# Patient Record
Sex: MALE | Race: BLACK OR AFRICAN AMERICAN | Employment: UNEMPLOYED | URBAN - METROPOLITAN AREA
[De-identification: names, ages, dates, MRNs, and addresses within clinical notes are randomized per-mention and may not be internally consistent; named-entity substitution may affect disease eponyms.]

---

## 2020-10-14 ENCOUNTER — OFFICE VISIT (OUTPATIENT)
Dept: ORTHOPEDIC SURGERY | Age: 55
End: 2020-10-14
Payer: COMMERCIAL

## 2020-10-14 VITALS
BODY MASS INDEX: 30.52 KG/M2 | DIASTOLIC BLOOD PRESSURE: 81 MMHG | TEMPERATURE: 98.1 F | SYSTOLIC BLOOD PRESSURE: 140 MMHG | OXYGEN SATURATION: 98 % | HEART RATE: 90 BPM | HEIGHT: 71 IN | RESPIRATION RATE: 18 BRPM | WEIGHT: 218 LBS

## 2020-10-14 DIAGNOSIS — S46.101A INJURY OF TENDON OF LONG HEAD OF RIGHT BICEPS, INITIAL ENCOUNTER: ICD-10-CM

## 2020-10-14 DIAGNOSIS — M75.111 INCOMPLETE ROTATOR CUFF TEAR OR RUPTURE OF RIGHT SHOULDER, NOT SPECIFIED AS TRAUMATIC: Primary | ICD-10-CM

## 2020-10-14 DIAGNOSIS — M75.41 IMPINGEMENT SYNDROME OF RIGHT SHOULDER: ICD-10-CM

## 2020-10-14 PROCEDURE — 99203 OFFICE O/P NEW LOW 30 MIN: CPT | Performed by: ORTHOPAEDIC SURGERY

## 2020-10-14 RX ORDER — DICLOFENAC SODIUM 75 MG/1
TABLET, DELAYED RELEASE ORAL
COMMUNITY
End: 2020-10-29

## 2020-10-14 RX ORDER — DICLOFENAC SODIUM 10 MG/G
GEL TOPICAL 4 TIMES DAILY
COMMUNITY
End: 2020-10-29

## 2020-10-14 NOTE — PROGRESS NOTES
Patient: Domingo Dupont                MRN: [id number]       SSN: [id number]  YOB: 1965        AGE: 63 y.o. SEX: male  Body mass index is 30.4 kg/m². PCP: MONA Lord  10/14/20    CHIEF COMPLAINT: Right shoulder pain    HPI: Domingo Dupont is a 63 y.o. male patient who presents to the office today with right shoulder pain. He is been having right shoulder pain for 8 or 9 months. He denies any specific injury or trauma. The pain is deep in the shoulder and points to the anterior lateral aspect of her shoulder for the pain. Is worse at night when he sleeps. He is tried injections, oral anti-inflammatories, physical therapy without resolution of symptoms. He has pain when he raises his arm. He has had an MRI and is here today to discuss the possibility of surgical treatment. Past Medical History:   Diagnosis Date    Arthritis     Degenerative arthritis of right knee     Hypertension        History reviewed. No pertinent family history. Current Outpatient Medications   Medication Sig Dispense Refill    diclofenac (VOLTAREN) 1 % gel Apply  to affected area four (4) times daily.  diclofenac EC (VOLTAREN) 75 mg EC tablet Take  by mouth.  oxyCODONE-acetaminophen (PERCOCET 7.5) 7.5-325 mg per tablet       amLODIPine (NORVASC) 10 mg tablet Take  by mouth daily.  PREGABALIN (LYRICA PO) Take  by mouth.  GABAPENTIN (NEURONTIN PO) Take  by mouth.  meloxicam (MOBIC) 15 mg tablet Take 1 Tab by mouth daily. Take with meals 30 Tab 2    HYDROCODONE/ACETAMINOPHEN (VICODIN PO) Take  by mouth.  LISINOPRIL PO Take  by mouth.  NABUMETONE (RELAFEN PO) Take  by mouth. Allergies   Allergen Reactions    Tramadol Nausea Only       History reviewed. No pertinent surgical history.     Social History     Socioeconomic History    Marital status:      Spouse name: Not on file    Number of children: Not on file    Years of education: Not on file    Highest education level: Not on file   Occupational History    Not on file   Social Needs    Financial resource strain: Not on file    Food insecurity     Worry: Not on file     Inability: Not on file    Transportation needs     Medical: Not on file     Non-medical: Not on file   Tobacco Use    Smoking status: Never Smoker    Smokeless tobacco: Never Used   Substance and Sexual Activity    Alcohol use: Yes     Alcohol/week: 0.0 standard drinks     Frequency: Never     Comment: occaisional    Drug use: Never    Sexual activity: Not on file   Lifestyle    Physical activity     Days per week: Not on file     Minutes per session: Not on file    Stress: Not on file   Relationships    Social connections     Talks on phone: Not on file     Gets together: Not on file     Attends Restoration service: Not on file     Active member of club or organization: Not on file     Attends meetings of clubs or organizations: Not on file     Relationship status: Not on file    Intimate partner violence     Fear of current or ex partner: Not on file     Emotionally abused: Not on file     Physically abused: Not on file     Forced sexual activity: Not on file   Other Topics Concern    Not on file   Social History Narrative    Not on file       REVIEW OF SYSTEMS:      CON: negative for recent weight loss/gain, fever, or chills  EYE: negative for double or blurry vision  ENT: negative for hoarseness  RS:   negative for cough, URI, SOB  CV:  negative for chest pain, palpitations  GI:    negative for blood in stool, nausea/vomiting  :  negative for blood in urine  MS: As per HPI  Other systems reviewed and noted below. PHYSICAL EXAMINATION:  Visit Vitals  BP (!) 140/81 (BP 1 Location: Left arm)   Pulse 90   Temp 98.1 °F (36.7 °C)   Resp 18   Ht 5' 11\" (1.803 m)   Wt 218 lb (98.9 kg)   SpO2 98%   BMI 30.40 kg/m²     Body mass index is 30.4 kg/m².     GENERAL: Alert and oriented x3, in no acute distress, well-developed, well-nourished. HEENT: Normocephalic, atraumatic. RESP: Non labored breathing with equal chest rise on inspiration. CV: Well perfused extremities. No cyanosis or clubbing noted. ABDOMEN: Soft, non-tender, non-distended. Shoulder Examination     R   L  ROM   FF  Full   Full  ER  Full   Full   IR  Full   Full  Rotator Cuff Pain   Supra  +   -   Infra  -   -   Subscap -   -  Crepitus  -   -  Effusion  -   -  Warmth  -   -   Erythema  -   -  Instability  -   -  AC Joint TTP  -   -  Clavicle   Deformity -   -   TTP  -   -  Proximal Humerus   Deformity -   -   TTP  -   -  Deltoid Strength 5   5  Biceps Strength 5   5  Biceps Deformity -   -  Biceps Groove Pain +   -  Impingement Sign +   -       IMAGING:  X-rays of the right shoulder, 4 views, were taken in the office today. They do not show any significant bony abnormalities. An MRI from 6/26/2020 was reviewed in the office today. This is of the right shoulder. This shows a high-grade partial-thickness tear of the supraspinatus and possibly upper border subscapularis. ASSESSMENT & PLAN  Diagnosis: Right shoulder rotator cuff tear, high-grade partial-thickness, impingement, right long head biceps tendinopathy    Leti Haley has right shoulder pain from his rotator cuff. He also has impingement and biceps pain. He is failed conservative treatment of this in the form of injections, oral medications, and physical therapy. As such we discussed the possibility of surgery in the form of an arthroscopic rotator cuff repair, subacromial decompression and biceps tenodesis. We discussed recovery as well as risks and benefits and he would like to move forward with surgery. We will start the process of trying to get all set up for him. The patient was counseled at length about the risks of odalys Covid-19 during their perioperative period and any recovery window from their procedure.   The patient was made aware that odalys Covid-19  may worsen their prognosis for recovering from their procedure and lend to a higher morbidity and/or mortality risk. All material risks, benefits, and reasonable alternatives including postponing the procedure were discussed. The patient does  wish to proceed with the procedure at this time.         Electronically signed by: Dimas Corea MD

## 2020-10-14 NOTE — H&P (VIEW-ONLY)
Patient: Mela Roa                MRN: [id number]       SSN: [id number] YOB: 1965        AGE: 63 y.o. SEX: male Body mass index is 30.4 kg/m². PCP: MONA Lopez 
10/14/20 CHIEF COMPLAINT: Right shoulder pain HPI: Mela Roa is a 63 y.o. male patient who presents to the office today with right shoulder pain. He is been having right shoulder pain for 8 or 9 months. He denies any specific injury or trauma. The pain is deep in the shoulder and points to the anterior lateral aspect of her shoulder for the pain. Is worse at night when he sleeps. He is tried injections, oral anti-inflammatories, physical therapy without resolution of symptoms. He has pain when he raises his arm. He has had an MRI and is here today to discuss the possibility of surgical treatment. Past Medical History:  
Diagnosis Date  Arthritis  Degenerative arthritis of right knee  Hypertension History reviewed. No pertinent family history. Current Outpatient Medications Medication Sig Dispense Refill  diclofenac (VOLTAREN) 1 % gel Apply  to affected area four (4) times daily.  diclofenac EC (VOLTAREN) 75 mg EC tablet Take  by mouth.  oxyCODONE-acetaminophen (PERCOCET 7.5) 7.5-325 mg per tablet  amLODIPine (NORVASC) 10 mg tablet Take  by mouth daily.  PREGABALIN (LYRICA PO) Take  by mouth.  GABAPENTIN (NEURONTIN PO) Take  by mouth.  meloxicam (MOBIC) 15 mg tablet Take 1 Tab by mouth daily. Take with meals 30 Tab 2  
 HYDROCODONE/ACETAMINOPHEN (VICODIN PO) Take  by mouth.  LISINOPRIL PO Take  by mouth.  NABUMETONE (RELAFEN PO) Take  by mouth. Allergies Allergen Reactions  Tramadol Nausea Only History reviewed. No pertinent surgical history. Social History Socioeconomic History  Marital status:  Spouse name: Not on file  Number of children: Not on file  Years of education: Not on file  Highest education level: Not on file Occupational History  Not on file Social Needs  Financial resource strain: Not on file  Food insecurity Worry: Not on file Inability: Not on file  Transportation needs Medical: Not on file Non-medical: Not on file Tobacco Use  Smoking status: Never Smoker  Smokeless tobacco: Never Used Substance and Sexual Activity  Alcohol use: Yes Alcohol/week: 0.0 standard drinks Frequency: Never Comment: occaisional  
 Drug use: Never  Sexual activity: Not on file Lifestyle  Physical activity Days per week: Not on file Minutes per session: Not on file  Stress: Not on file Relationships  Social connections Talks on phone: Not on file Gets together: Not on file Attends Hindu service: Not on file Active member of club or organization: Not on file Attends meetings of clubs or organizations: Not on file Relationship status: Not on file  Intimate partner violence Fear of current or ex partner: Not on file Emotionally abused: Not on file Physically abused: Not on file Forced sexual activity: Not on file Other Topics Concern  Not on file Social History Narrative  Not on file REVIEW OF SYSTEMS:   
 
CON: negative for recent weight loss/gain, fever, or chills EYE: negative for double or blurry vision ENT: negative for hoarseness RS:   negative for cough, URI, SOB 
CV:  negative for chest pain, palpitations GI:    negative for blood in stool, nausea/vomiting :  negative for blood in urine MS: As per HPI Other systems reviewed and noted below. PHYSICAL EXAMINATION: 
Visit Vitals BP (!) 140/81 (BP 1 Location: Left arm) Pulse 90 Temp 98.1 °F (36.7 °C) Resp 18 Ht 5' 11\" (1.803 m) Wt 218 lb (98.9 kg) SpO2 98% BMI 30.40 kg/m² Body mass index is 30.4 kg/m². GENERAL: Alert and oriented x3, in no acute distress, well-developed, well-nourished. HEENT: Normocephalic, atraumatic. RESP: Non labored breathing with equal chest rise on inspiration. CV: Well perfused extremities. No cyanosis or clubbing noted. ABDOMEN: Soft, non-tender, non-distended. Shoulder Examination R   L 
ROM 
 FF  Full   Full ER  Full   Full 
 IR  Full   Full Rotator Cuff Pain Supra  +   - Infra  -   - Subscap -   - 
Crepitus  -   - Effusion  -   - Warmth  -   - Erythema  -   - Instability  -   - 
AC Joint TTP  -   - 
Clavicle Deformity -   - 
 TTP  -   - Proximal Humerus Deformity -   - 
 TTP  -   - Deltoid Strength 5   5 Biceps Strength 5   5 Biceps Deformity -   - Biceps Groove Pain +   - Impingement Sign +   - IMAGING: 
X-rays of the right shoulder, 4 views, were taken in the office today. They do not show any significant bony abnormalities. An MRI from 6/26/2020 was reviewed in the office today. This is of the right shoulder. This shows a high-grade partial-thickness tear of the supraspinatus and possibly upper border subscapularis. ASSESSMENT & PLAN Diagnosis: Right shoulder rotator cuff tear, high-grade partial-thickness, impingement, right long head biceps tendinopathy Prem Dejesus has right shoulder pain from his rotator cuff. He also has impingement and biceps pain. He is failed conservative treatment of this in the form of injections, oral medications, and physical therapy. As such we discussed the possibility of surgery in the form of an arthroscopic rotator cuff repair, subacromial decompression and biceps tenodesis. We discussed recovery as well as risks and benefits and he would like to move forward with surgery. We will start the process of trying to get all set up for him.  
 
The patient was counseled at length about the risks of odalys Covid-19 during their perioperative period and any recovery window from their procedure. The patient was made aware that odalys Covid-19  may worsen their prognosis for recovering from their procedure and lend to a higher morbidity and/or mortality risk. All material risks, benefits, and reasonable alternatives including postponing the procedure were discussed. The patient does  wish to proceed with the procedure at this time.  
 
 
 
Electronically signed by: Mecca Ronquillo MD

## 2020-10-20 ENCOUNTER — TELEPHONE (OUTPATIENT)
Dept: ORTHOPEDIC SURGERY | Age: 55
End: 2020-10-20

## 2020-10-20 RX ORDER — DICLOFENAC SODIUM 75 MG/1
75 TABLET, DELAYED RELEASE ORAL 2 TIMES DAILY WITH MEALS
Qty: 60 TAB | Refills: 2 | Status: SHIPPED | OUTPATIENT
Start: 2020-10-20 | End: 2020-10-29

## 2020-10-27 ENCOUNTER — HOSPITAL ENCOUNTER (OUTPATIENT)
Dept: PREADMISSION TESTING | Age: 55
Discharge: HOME | End: 2020-10-27
Payer: COMMERCIAL

## 2020-10-27 DIAGNOSIS — Z01.810 PRE-OPERATIVE CARDIOVASCULAR EXAMINATION: ICD-10-CM

## 2020-10-27 DIAGNOSIS — M75.41 IMPINGEMENT SYNDROME OF RIGHT SHOULDER: ICD-10-CM

## 2020-10-27 DIAGNOSIS — M75.111 INCOMPLETE ROTATOR CUFF TEAR OR RUPTURE OF RIGHT SHOULDER, NOT SPECIFIED AS TRAUMATIC: Primary | ICD-10-CM

## 2020-10-27 DIAGNOSIS — M75.111 INCOMPLETE ROTATOR CUFF TEAR OR RUPTURE OF RIGHT SHOULDER, NOT SPECIFIED AS TRAUMATIC: ICD-10-CM

## 2020-10-27 DIAGNOSIS — Z01.812 PRE-OPERATIVE LABORATORY EXAMINATION: ICD-10-CM

## 2020-10-27 DIAGNOSIS — S46.101A INJURY OF TENDON OF LONG HEAD OF RIGHT BICEPS, INITIAL ENCOUNTER: ICD-10-CM

## 2020-10-27 LAB
ALBUMIN SERPL-MCNC: 3.9 G/DL (ref 3.4–5)
ALBUMIN/GLOB SERPL: 1.1 {RATIO} (ref 0.8–1.7)
ALP SERPL-CCNC: 67 U/L (ref 45–117)
ALT SERPL-CCNC: 28 U/L (ref 16–61)
ANION GAP SERPL CALC-SCNC: 4 MMOL/L (ref 3–18)
AST SERPL-CCNC: 14 U/L (ref 10–38)
ATRIAL RATE: 91 BPM
BASOPHILS # BLD: 0 K/UL (ref 0–0.1)
BASOPHILS NFR BLD: 0 % (ref 0–2)
BILIRUB SERPL-MCNC: 0.2 MG/DL (ref 0.2–1)
BUN SERPL-MCNC: 16 MG/DL (ref 7–18)
BUN/CREAT SERPL: 12 (ref 12–20)
CALCIUM SERPL-MCNC: 9.5 MG/DL (ref 8.5–10.1)
CALCULATED P AXIS, ECG09: 54 DEGREES
CALCULATED R AXIS, ECG10: 21 DEGREES
CALCULATED T AXIS, ECG11: 15 DEGREES
CHLORIDE SERPL-SCNC: 108 MMOL/L (ref 100–111)
CO2 SERPL-SCNC: 31 MMOL/L (ref 21–32)
CREAT SERPL-MCNC: 1.33 MG/DL (ref 0.6–1.3)
DIAGNOSIS, 93000: NORMAL
DIFFERENTIAL METHOD BLD: ABNORMAL
EOSINOPHIL # BLD: 0.2 K/UL (ref 0–0.4)
EOSINOPHIL NFR BLD: 3 % (ref 0–5)
ERYTHROCYTE [DISTWIDTH] IN BLOOD BY AUTOMATED COUNT: 14.5 % (ref 11.6–14.5)
GLOBULIN SER CALC-MCNC: 3.5 G/DL (ref 2–4)
GLUCOSE SERPL-MCNC: 94 MG/DL (ref 74–99)
HCT VFR BLD AUTO: 35.9 % (ref 36–48)
HGB BLD-MCNC: 12.6 G/DL (ref 13–16)
LYMPHOCYTES # BLD: 2.8 K/UL (ref 0.9–3.6)
LYMPHOCYTES NFR BLD: 41 % (ref 21–52)
MCH RBC QN AUTO: 26.6 PG (ref 24–34)
MCHC RBC AUTO-ENTMCNC: 35.1 G/DL (ref 31–37)
MCV RBC AUTO: 75.7 FL (ref 74–97)
MONOCYTES # BLD: 0.5 K/UL (ref 0.05–1.2)
MONOCYTES NFR BLD: 7 % (ref 3–10)
NEUTS SEG # BLD: 3.2 K/UL (ref 1.8–8)
NEUTS SEG NFR BLD: 49 % (ref 40–73)
P-R INTERVAL, ECG05: 158 MS
PLATELET # BLD AUTO: 297 K/UL (ref 135–420)
PMV BLD AUTO: 9.7 FL (ref 9.2–11.8)
POTASSIUM SERPL-SCNC: 4.4 MMOL/L (ref 3.5–5.5)
PROT SERPL-MCNC: 7.4 G/DL (ref 6.4–8.2)
Q-T INTERVAL, ECG07: 336 MS
QRS DURATION, ECG06: 74 MS
QTC CALCULATION (BEZET), ECG08: 413 MS
RBC # BLD AUTO: 4.74 M/UL (ref 4.7–5.5)
SODIUM SERPL-SCNC: 143 MMOL/L (ref 136–145)
VENTRICULAR RATE, ECG03: 91 BPM
WBC # BLD AUTO: 6.7 K/UL (ref 4.6–13.2)

## 2020-10-27 PROCEDURE — 85025 COMPLETE CBC W/AUTO DIFF WBC: CPT

## 2020-10-27 PROCEDURE — 80053 COMPREHEN METABOLIC PANEL: CPT

## 2020-10-27 PROCEDURE — 93005 ELECTROCARDIOGRAM TRACING: CPT

## 2020-10-27 PROCEDURE — 36415 COLL VENOUS BLD VENIPUNCTURE: CPT

## 2020-10-29 ENCOUNTER — HOSPITAL ENCOUNTER (OUTPATIENT)
Dept: PREADMISSION TESTING | Age: 55
Discharge: HOME | End: 2020-10-29
Payer: COMMERCIAL

## 2020-10-29 DIAGNOSIS — Z01.812 PRE-OPERATIVE LABORATORY EXAMINATION: ICD-10-CM

## 2020-10-29 DIAGNOSIS — M75.41 IMPINGEMENT SYNDROME OF RIGHT SHOULDER: ICD-10-CM

## 2020-10-29 DIAGNOSIS — M75.111 INCOMPLETE ROTATOR CUFF TEAR OR RUPTURE OF RIGHT SHOULDER, NOT SPECIFIED AS TRAUMATIC: ICD-10-CM

## 2020-10-29 DIAGNOSIS — S46.101A INJURY OF TENDON OF LONG HEAD OF RIGHT BICEPS, INITIAL ENCOUNTER: ICD-10-CM

## 2020-10-29 PROCEDURE — 87635 SARS-COV-2 COVID-19 AMP PRB: CPT

## 2020-10-29 RX ORDER — TELMISARTAN 20 MG/1
20 TABLET ORAL DAILY
COMMUNITY

## 2020-10-31 LAB — SARS-COV-2, COV2NT: NOT DETECTED

## 2020-11-02 ENCOUNTER — ANESTHESIA EVENT (OUTPATIENT)
Dept: SURGERY | Age: 55
End: 2020-11-02
Payer: COMMERCIAL

## 2020-11-03 ENCOUNTER — HOSPITAL ENCOUNTER (OUTPATIENT)
Age: 55
Setting detail: OUTPATIENT SURGERY
Discharge: HOME | End: 2020-11-03
Attending: ORTHOPAEDIC SURGERY | Admitting: ORTHOPAEDIC SURGERY
Payer: COMMERCIAL

## 2020-11-03 ENCOUNTER — ANESTHESIA (OUTPATIENT)
Dept: SURGERY | Age: 55
End: 2020-11-03
Payer: COMMERCIAL

## 2020-11-03 VITALS
SYSTOLIC BLOOD PRESSURE: 160 MMHG | RESPIRATION RATE: 15 BRPM | HEART RATE: 92 BPM | OXYGEN SATURATION: 96 % | TEMPERATURE: 97 F | DIASTOLIC BLOOD PRESSURE: 91 MMHG | BODY MASS INDEX: 30.1 KG/M2 | WEIGHT: 215 LBS | HEIGHT: 71 IN

## 2020-11-03 DIAGNOSIS — M75.41 SUBACROMIAL IMPINGEMENT OF RIGHT SHOULDER: ICD-10-CM

## 2020-11-03 DIAGNOSIS — G89.18 PAIN FOLLOWING SURGERY OR PROCEDURE: Primary | ICD-10-CM

## 2020-11-03 DIAGNOSIS — M75.121 COMPLETE TEAR OF RIGHT ROTATOR CUFF, UNSPECIFIED WHETHER TRAUMATIC: ICD-10-CM

## 2020-11-03 DIAGNOSIS — M75.21 BICIPITAL TENDONITIS OF RIGHT SHOULDER: ICD-10-CM

## 2020-11-03 PROCEDURE — 76210000021 HC REC RM PH II 0.5 TO 1 HR: Performed by: ORTHOPAEDIC SURGERY

## 2020-11-03 PROCEDURE — 29828 SHO ARTHRS SRG BICP TENODSIS: CPT | Performed by: ORTHOPAEDIC SURGERY

## 2020-11-03 PROCEDURE — 29826 SHO ARTHRS SRG DECOMPRESSION: CPT | Performed by: ORTHOPAEDIC SURGERY

## 2020-11-03 PROCEDURE — 74011250636 HC RX REV CODE- 250/636

## 2020-11-03 PROCEDURE — 01630 ANES OPN/ARTHR PX SHO JT NOS: CPT | Performed by: NURSE ANESTHETIST, CERTIFIED REGISTERED

## 2020-11-03 PROCEDURE — 76942 ECHO GUIDE FOR BIOPSY: CPT | Performed by: ANESTHESIOLOGY

## 2020-11-03 PROCEDURE — 77030040361 HC SLV COMPR DVT MDII -B: Performed by: ORTHOPAEDIC SURGERY

## 2020-11-03 PROCEDURE — 77030002916 HC SUT ETHLN J&J -A: Performed by: ORTHOPAEDIC SURGERY

## 2020-11-03 PROCEDURE — 64415 NJX AA&/STRD BRCH PLXS IMG: CPT | Performed by: ANESTHESIOLOGY

## 2020-11-03 PROCEDURE — 74011250636 HC RX REV CODE- 250/636: Performed by: ORTHOPAEDIC SURGERY

## 2020-11-03 PROCEDURE — 74011000250 HC RX REV CODE- 250: Performed by: NURSE ANESTHETIST, CERTIFIED REGISTERED

## 2020-11-03 PROCEDURE — 77030037837: Performed by: ORTHOPAEDIC SURGERY

## 2020-11-03 PROCEDURE — 74011000258 HC RX REV CODE- 258: Performed by: NURSE ANESTHETIST, CERTIFIED REGISTERED

## 2020-11-03 PROCEDURE — 29827 SHO ARTHRS SRG RT8TR CUF RPR: CPT | Performed by: ORTHOPAEDIC SURGERY

## 2020-11-03 PROCEDURE — 01630 ANES OPN/ARTHR PX SHO JT NOS: CPT | Performed by: ANESTHESIOLOGY

## 2020-11-03 PROCEDURE — 74011250636 HC RX REV CODE- 250/636: Performed by: NURSE ANESTHETIST, CERTIFIED REGISTERED

## 2020-11-03 PROCEDURE — C1713 ANCHOR/SCREW BN/BN,TIS/BN: HCPCS | Performed by: ORTHOPAEDIC SURGERY

## 2020-11-03 PROCEDURE — 2709999900 HC NON-CHARGEABLE SUPPLY: Performed by: ORTHOPAEDIC SURGERY

## 2020-11-03 PROCEDURE — 77030004453 HC BUR SHV STRY -B: Performed by: ORTHOPAEDIC SURGERY

## 2020-11-03 PROCEDURE — 77030010427: Performed by: ORTHOPAEDIC SURGERY

## 2020-11-03 PROCEDURE — 76010000153 HC OR TIME 1.5 TO 2 HR: Performed by: ORTHOPAEDIC SURGERY

## 2020-11-03 PROCEDURE — 77030017964 HC PRB COAG4 STRY -C: Performed by: ORTHOPAEDIC SURGERY

## 2020-11-03 PROCEDURE — 77030013789 HC KT REP BIO TEND ARTH -C: Performed by: ORTHOPAEDIC SURGERY

## 2020-11-03 PROCEDURE — 76060000034 HC ANESTHESIA 1.5 TO 2 HR: Performed by: ORTHOPAEDIC SURGERY

## 2020-11-03 PROCEDURE — 76210000006 HC OR PH I REC 0.5 TO 1 HR: Performed by: ORTHOPAEDIC SURGERY

## 2020-11-03 PROCEDURE — 77030033005 HC TBNG ARTHSC PMP STRY -B: Performed by: ORTHOPAEDIC SURGERY

## 2020-11-03 PROCEDURE — 77030019605: Performed by: ORTHOPAEDIC SURGERY

## 2020-11-03 PROCEDURE — 77030022036 HC BLD SHV TOMCAT STRY -B: Performed by: ORTHOPAEDIC SURGERY

## 2020-11-03 PROCEDURE — 77030003601 HC NDL NRV BLK BBMI -A: Performed by: ORTHOPAEDIC SURGERY

## 2020-11-03 PROCEDURE — 74011250636 HC RX REV CODE- 250/636: Performed by: ANESTHESIOLOGY

## 2020-11-03 PROCEDURE — 77030040922 HC BLNKT HYPOTHRM STRY -A: Performed by: ORTHOPAEDIC SURGERY

## 2020-11-03 PROCEDURE — 74011250637 HC RX REV CODE- 250/637: Performed by: NURSE ANESTHETIST, CERTIFIED REGISTERED

## 2020-11-03 DEVICE — ANCHOR SUT L14MM DIA4.75MM BIOCOMP W/ 2 1.3MM WHT BLK AND: Type: IMPLANTABLE DEVICE | Site: SHOULDER | Status: FUNCTIONAL

## 2020-11-03 DEVICE — KIT INSTR W/ 2.4MM GUIDEPIN SUT PASS WIRE NO2 FIBERWIRE: Type: IMPLANTABLE DEVICE | Site: SHOULDER | Status: FUNCTIONAL

## 2020-11-03 DEVICE — ANCHOR SUTURE BIOCOMP 4.75X19.1 MM SWIVELOCK C: Type: IMPLANTABLE DEVICE | Site: SHOULDER | Status: FUNCTIONAL

## 2020-11-03 DEVICE — ANCHOR SUT L19.1MM DIA7MM BIOCOMPOSITE SWIVELOCK TENODESIS: Type: IMPLANTABLE DEVICE | Site: SHOULDER | Status: FUNCTIONAL

## 2020-11-03 RX ORDER — ROPIVACAINE HYDROCHLORIDE 5 MG/ML
INJECTION, SOLUTION EPIDURAL; INFILTRATION; PERINEURAL
Status: COMPLETED | OUTPATIENT
Start: 2020-11-03 | End: 2020-11-03

## 2020-11-03 RX ORDER — ONDANSETRON 2 MG/ML
INJECTION INTRAMUSCULAR; INTRAVENOUS AS NEEDED
Status: DISCONTINUED | OUTPATIENT
Start: 2020-11-03 | End: 2020-11-03 | Stop reason: HOSPADM

## 2020-11-03 RX ORDER — ROPIVACAINE HYDROCHLORIDE 5 MG/ML
30 INJECTION, SOLUTION EPIDURAL; INFILTRATION; PERINEURAL
Status: COMPLETED | OUTPATIENT
Start: 2020-11-03 | End: 2020-11-03

## 2020-11-03 RX ORDER — PROPOFOL 10 MG/ML
INJECTION, EMULSION INTRAVENOUS AS NEEDED
Status: DISCONTINUED | OUTPATIENT
Start: 2020-11-03 | End: 2020-11-03 | Stop reason: HOSPADM

## 2020-11-03 RX ORDER — SUCCINYLCHOLINE CHLORIDE 20 MG/ML
INJECTION INTRAMUSCULAR; INTRAVENOUS AS NEEDED
Status: DISCONTINUED | OUTPATIENT
Start: 2020-11-03 | End: 2020-11-03 | Stop reason: HOSPADM

## 2020-11-03 RX ORDER — SODIUM CHLORIDE, SODIUM LACTATE, POTASSIUM CHLORIDE, CALCIUM CHLORIDE 600; 310; 30; 20 MG/100ML; MG/100ML; MG/100ML; MG/100ML
75 INJECTION, SOLUTION INTRAVENOUS CONTINUOUS
Status: DISCONTINUED | OUTPATIENT
Start: 2020-11-03 | End: 2020-11-03 | Stop reason: HOSPADM

## 2020-11-03 RX ORDER — SODIUM CHLORIDE 0.9 % (FLUSH) 0.9 %
5-40 SYRINGE (ML) INJECTION AS NEEDED
Status: DISCONTINUED | OUTPATIENT
Start: 2020-11-03 | End: 2020-11-03 | Stop reason: HOSPADM

## 2020-11-03 RX ORDER — FAMOTIDINE 20 MG/1
20 TABLET, FILM COATED ORAL ONCE
Status: COMPLETED | OUTPATIENT
Start: 2020-11-03 | End: 2020-11-03

## 2020-11-03 RX ORDER — FENTANYL CITRATE 50 UG/ML
INJECTION, SOLUTION INTRAMUSCULAR; INTRAVENOUS AS NEEDED
Status: DISCONTINUED | OUTPATIENT
Start: 2020-11-03 | End: 2020-11-03 | Stop reason: HOSPADM

## 2020-11-03 RX ORDER — OXYCODONE HYDROCHLORIDE 5 MG/1
5 TABLET ORAL
Qty: 35 TAB | Refills: 0 | Status: SHIPPED | OUTPATIENT
Start: 2020-11-03 | End: 2020-11-10 | Stop reason: SDUPTHER

## 2020-11-03 RX ORDER — DEXAMETHASONE SODIUM PHOSPHATE 4 MG/ML
INJECTION, SOLUTION INTRA-ARTICULAR; INTRALESIONAL; INTRAMUSCULAR; INTRAVENOUS; SOFT TISSUE AS NEEDED
Status: DISCONTINUED | OUTPATIENT
Start: 2020-11-03 | End: 2020-11-03 | Stop reason: HOSPADM

## 2020-11-03 RX ORDER — FENTANYL CITRATE 50 UG/ML
100 INJECTION, SOLUTION INTRAMUSCULAR; INTRAVENOUS ONCE
Status: COMPLETED | OUTPATIENT
Start: 2020-11-03 | End: 2020-11-03

## 2020-11-03 RX ORDER — FENTANYL CITRATE 50 UG/ML
50 INJECTION, SOLUTION INTRAMUSCULAR; INTRAVENOUS AS NEEDED
Status: DISCONTINUED | OUTPATIENT
Start: 2020-11-03 | End: 2020-11-03 | Stop reason: HOSPADM

## 2020-11-03 RX ORDER — HYDROMORPHONE HYDROCHLORIDE 2 MG/ML
0.5 INJECTION, SOLUTION INTRAMUSCULAR; INTRAVENOUS; SUBCUTANEOUS
Status: DISCONTINUED | OUTPATIENT
Start: 2020-11-03 | End: 2020-11-03 | Stop reason: HOSPADM

## 2020-11-03 RX ORDER — LIDOCAINE HYDROCHLORIDE 10 MG/ML
3 INJECTION, SOLUTION EPIDURAL; INFILTRATION; INTRACAUDAL; PERINEURAL ONCE
Status: DISCONTINUED | OUTPATIENT
Start: 2020-11-03 | End: 2020-11-03 | Stop reason: HOSPADM

## 2020-11-03 RX ORDER — CEFAZOLIN SODIUM 2 G/50ML
2 SOLUTION INTRAVENOUS ONCE
Status: COMPLETED | OUTPATIENT
Start: 2020-11-03 | End: 2020-11-03

## 2020-11-03 RX ORDER — SODIUM CHLORIDE 0.9 % (FLUSH) 0.9 %
5-40 SYRINGE (ML) INJECTION EVERY 8 HOURS
Status: DISCONTINUED | OUTPATIENT
Start: 2020-11-03 | End: 2020-11-03 | Stop reason: HOSPADM

## 2020-11-03 RX ORDER — MIDAZOLAM HYDROCHLORIDE 1 MG/ML
2 INJECTION, SOLUTION INTRAMUSCULAR; INTRAVENOUS ONCE
Status: COMPLETED | OUTPATIENT
Start: 2020-11-03 | End: 2020-11-03

## 2020-11-03 RX ORDER — ONDANSETRON 2 MG/ML
4 INJECTION INTRAMUSCULAR; INTRAVENOUS ONCE
Status: DISCONTINUED | OUTPATIENT
Start: 2020-11-03 | End: 2020-11-03 | Stop reason: HOSPADM

## 2020-11-03 RX ORDER — SODIUM CHLORIDE, SODIUM LACTATE, POTASSIUM CHLORIDE, CALCIUM CHLORIDE 600; 310; 30; 20 MG/100ML; MG/100ML; MG/100ML; MG/100ML
25 INJECTION, SOLUTION INTRAVENOUS CONTINUOUS
Status: DISCONTINUED | OUTPATIENT
Start: 2020-11-03 | End: 2020-11-03 | Stop reason: HOSPADM

## 2020-11-03 RX ORDER — LIDOCAINE HYDROCHLORIDE 20 MG/ML
INJECTION, SOLUTION EPIDURAL; INFILTRATION; INTRACAUDAL; PERINEURAL AS NEEDED
Status: DISCONTINUED | OUTPATIENT
Start: 2020-11-03 | End: 2020-11-03 | Stop reason: HOSPADM

## 2020-11-03 RX ADMIN — PHENYLEPHRINE HYDROCHLORIDE 20 MCG/MIN: 10 INJECTION INTRAVENOUS at 10:57

## 2020-11-03 RX ADMIN — SODIUM CHLORIDE, SODIUM LACTATE, POTASSIUM CHLORIDE, AND CALCIUM CHLORIDE: 600; 310; 30; 20 INJECTION, SOLUTION INTRAVENOUS at 11:42

## 2020-11-03 RX ADMIN — PHENYLEPHRINE HYDROCHLORIDE 100 MCG: 10 INJECTION INTRAVENOUS at 10:16

## 2020-11-03 RX ADMIN — LIDOCAINE HYDROCHLORIDE 100 MG: 20 INJECTION, SOLUTION EPIDURAL; INFILTRATION; INTRACAUDAL; PERINEURAL at 10:01

## 2020-11-03 RX ADMIN — FAMOTIDINE 20 MG: 20 TABLET, FILM COATED ORAL at 09:22

## 2020-11-03 RX ADMIN — MIDAZOLAM 2 MG: 1 INJECTION INTRAMUSCULAR; INTRAVENOUS at 09:35

## 2020-11-03 RX ADMIN — ROPIVACAINE HYDROCHLORIDE 150 MG: 5 INJECTION, SOLUTION EPIDURAL; INFILTRATION; PERINEURAL at 09:42

## 2020-11-03 RX ADMIN — PHENYLEPHRINE HYDROCHLORIDE 200 MCG: 10 INJECTION INTRAVENOUS at 10:52

## 2020-11-03 RX ADMIN — FENTANYL CITRATE 100 MCG: 50 INJECTION, SOLUTION INTRAMUSCULAR; INTRAVENOUS at 09:35

## 2020-11-03 RX ADMIN — SODIUM CHLORIDE, SODIUM LACTATE, POTASSIUM CHLORIDE, AND CALCIUM CHLORIDE 75 ML/HR: 600; 310; 30; 20 INJECTION, SOLUTION INTRAVENOUS at 09:10

## 2020-11-03 RX ADMIN — ONDANSETRON 4 MG: 2 INJECTION INTRAMUSCULAR; INTRAVENOUS at 11:40

## 2020-11-03 RX ADMIN — FENTANYL CITRATE 100 MCG: 50 INJECTION, SOLUTION INTRAMUSCULAR; INTRAVENOUS at 10:00

## 2020-11-03 RX ADMIN — SUCCINYLCHOLINE CHLORIDE 120 MG: 20 INJECTION, SOLUTION INTRAMUSCULAR; INTRAVENOUS at 10:01

## 2020-11-03 RX ADMIN — CEFAZOLIN SODIUM 2 G: 2 SOLUTION INTRAVENOUS at 10:06

## 2020-11-03 RX ADMIN — PHENYLEPHRINE HYDROCHLORIDE 200 MCG: 10 INJECTION INTRAVENOUS at 10:45

## 2020-11-03 RX ADMIN — PHENYLEPHRINE HYDROCHLORIDE 200 MCG: 10 INJECTION INTRAVENOUS at 10:26

## 2020-11-03 RX ADMIN — DEXAMETHASONE SODIUM PHOSPHATE 8 MG: 4 INJECTION, SOLUTION INTRAMUSCULAR; INTRAVENOUS at 10:08

## 2020-11-03 RX ADMIN — PROPOFOL 200 MG: 10 INJECTION, EMULSION INTRAVENOUS at 10:01

## 2020-11-03 RX ADMIN — PHENYLEPHRINE HYDROCHLORIDE 200 MCG: 10 INJECTION INTRAVENOUS at 10:40

## 2020-11-03 RX ADMIN — ROPIVACAINE HYDROCHLORIDE 30 ML: 5 INJECTION, SOLUTION EPIDURAL; INFILTRATION; PERINEURAL at 09:42

## 2020-11-03 NOTE — OP NOTES
[address] [place] [address]   OPERATIVE REPORT     Patient Name: Marjorie Christiansen  Medical Record Number:  [id number]  YOB: 1965  ACCOUNT #:  [de-identified]  DATE OF SERVICE: 11/3/2020     PREOPERATIVE DIAGNOSES:  1. Right shoulder rotator cuff tear  2. Right shoulder long head biceps tendinopathy  3. Right shoulder impingement     POSTOPERATIVE DIAGNOSES:  1. Right shoulder rotator cuff tear, upper border subscapularis and high-grade partial-thickness supraspinatus   2. Right shoulder long head biceps tendinopathy  3. Right shoulder impingement subacromial    PROCEDURES PERFORMED:  1. Right shoulder arthroscopic rotator cuff repair  2. Right shoulder arthroscopic biceps tenodesis  3. Right shoulder arthroscopic subacromial decompression     SURGEON:  Segundo Rolle. Kathryn Goyal MD     ASSISTANT:   JOCELIN Patel PA-C was medically necessary for the procedure. She assisted in patient positioning, arthroscopic equipment management including camera, suture passing, rotator cuff repair, biceps tenodesis, subacromial decompression, wound closure, placement of dressing and sling, and transfer the patient to the PACU. ANESTHESIA:   General with nerve block. COMPLICATIONS:   None. SPECIMENS REMOVED:  None. IMPLANTS:   Arthrex suture anchors. ESTIMATED BLOOD LOSS:   Minimal.     IV FLUIDS:   700 cc of LR. INDICATIONS FOR PROCEDURE:   Marjorie Christiansen is a 26-year-old male presented to the office with right shoulder pain. He failed conservative management including physical therapy, corticosteroid injections, activity modification, and oral medications. An MRI revealed a high-grade partial-thickness rotator cuff tear of the supraspinatus as well as biceps tendinopathy and impingement. After discussing the treatment options at length and failing conservative management he elected undergo the procedure described.      PROCEDURE:   Patient was identified in the preoperative holding area. The right shoulder was marked as extremity after confirmation with the patient. After discussing risk and benefits of procedure informed consent was confirmed. Anesthesia performed a nerve block in the preoperative holding area. The patient was then taken to the operating room. He was moved from the stretcher to the OR table. General anesthesia was induced and he was intubated. He was brought to the beachchair position. The right arm was prepped and draped in normal sterile fashion. A timeout was performed verifying the correct patient procedure and upper extremities on agreement. Antibiotics were given through the IV prior to skin incision. Surgery began with a posterior portal placement. The arthroscope was brought into the glenohumeral joint. Glenohumeral joint inspection revealed tearing of the superior labrum at the biceps anchor. An anterior portal was made in the rotator interval under spinal needle localization. The upper border the subscapularis was also noted to be torn. The biceps tendon was tenotomized for later tenodesis. The upper border the subscapularis was debrided as well as the lesser tuberosity. Fiber tape suture was passed through the upper border subscapularis and repaired to the lesser tuberosity using a swivel lock anchor. The partial-thickness tearing on the articular side of the supraspinatus was also noted. The scope was then brought to the subacromial space. Extensive subacromial bursectomy was performed. The anterior lateral aspect of the acromion was also identified using electrocautery. After removing the bursa through a lateral portal the arm was brought into forward flexion with the elbow flexed. The bicipital groove was identified and localized under spinal needle localization. A passport cannula was placed anteriorly in the shoulder.   Working through a passport cannula the biceps tendon was unroofed from the bicipital groove and externalized. The proximal 2 cm of the tendon were excised. A whipstitch was placed through the biceps tendon. A  hole was drilled in the bicipital groove with an 7 mm reamer. The biceps tendon was then tenodesed in the bicipital groove with a 7 mm swivel lock suture anchor. Attention was then turned to the rotator cuff tear. Using a probe the partial-thickness tear was able to be identified and the probe was able to be passed through the thin layer of tissue remaining to identify the area of the partial-thickness tear. This area was then debrided and converted to a full-thickness tear. The footprint of the greater tuberosity was debrided and burred to bleeding bone. The rotator cuff tear was also debrided using arthroscopic shaver. A single double loaded suture anchor was placed at the articular margin of the greater tuberosity. The suture tails were passed through the rotator cuff tear tied down and then brought to a separate single lateral anchor in the proximal lateral humerus. This completed the repair. Attention was then turned to the decompression. A type II acromion was found. The anterolateral aspect of the acromion was converted to a type I acromion coplaning with the distal clavicle. All loose bony debris was then removed with an arthroscopic shaver. Final pictures were taken. The scope instruments removed. The portal sites were closed in standard fashion. A sterile dressing was placed over the right shoulder. The drapes were taken down. A sling was placed. The patient was awake from anesthesia extubated and taken to PACU in stable condition with a plan for discharge home.      Electronically Signed Up   Iveth Rock MD  11/03/20  11:56 AM

## 2020-11-03 NOTE — ANESTHESIA POSTPROCEDURE EVALUATION
Procedure(s):  RIGHT SHOULDER ARTHROSCOPIC ROTATOR CUFF REPAIR/BICEPS TENODESIS/SUBACROMIAL DECOMPRESSIONARTHREX/SPIDER/TMAX/NERVE BLOCK.    general, regional    Anesthesia Post Evaluation      Multimodal analgesia: multimodal analgesia used between 6 hours prior to anesthesia start to PACU discharge  Patient location during evaluation: PACU  Patient participation: complete - patient participated  Level of consciousness: awake and alert  Pain management: adequate  Airway patency: patent  Anesthetic complications: no  Cardiovascular status: acceptable and hemodynamically stable  Respiratory status: acceptable  Hydration status: acceptable  Post anesthesia nausea and vomiting:  controlled      INITIAL Post-op Vital signs:   Vitals Value Taken Time   /83 11/3/2020 12:28 PM   Temp 36.4 °C (97.6 °F) 11/3/2020 12:01 PM   Pulse 91 11/3/2020 12:32 PM   Resp 11 11/3/2020 12:32 PM   SpO2 100 % 11/3/2020 12:33 PM   Vitals shown include unvalidated device data.

## 2020-11-03 NOTE — BRIEF OP NOTE
Brief Postoperative Note    Patient: Carley Cook  YOB: 1965  MRN: [id number]    Date of Procedure: 11/3/2020     Pre-Op Diagnosis: M75.111 RIGHT SHOULDER ROTATOR CUFF TEAR  S46.101A BICEPS TENDINITIS  M75.41 IMPINGMENT    Post-Op Diagnosis: Same as preoperative diagnosis. Procedure(s):  RIGHT SHOULDER ARTHROSCOPIC ROTATOR CUFF REPAIR/BICEPS TENODESIS/SUBACROMIAL DECOMPRESSION/ARTHREX/SPIDER/TMAX/NERVE BLOCK    Surgeon(s):  Heron Monahan MD    Surgical Assistant: Physician Assistant: MONA Limon  Surg Asst-1: Manuel RIVERA    Anesthesia: General     Estimated Blood Loss (mL): Minimal    Complications: None    Specimens: * No specimens in log *     Implants:   Implant Name Type Inv.  Item Serial No.  Lot No. LRB No. Used Action   KIT BIO-TENODESIS DISP STRL --  - [id number]  KIT BIO-TENODESIS DISP STRL --   [address] [employer] [id number] Right 1 Implanted   ANCHOR C SWIVELOCK 4.75MM - [id number]  Tahira Palatine 4.75MM  [address] [employer] [id number] Right 1 Implanted   ANCHOR SUT TENODESIS 7X19.1MM -- SWIVELOCK - [id number]  ANCHOR SUT TENODESIS 7X19.1MM -- Keena [employer] [id number] Right 1 Implanted   ANCHOR C SWIVELOCK 4.75MM - [id number]  ANCHOR Yudith Merchante SWIVELOCK 4.75MM  [employer] [id number] Right 1 Implanted   ANCHOR SUT 4.33Y35HB FT -- BIOCOMP CRKSCR - [id number]  ANCHOR SUT 4.57E42AL FT -- Katheleen Harada  [employer]WD [id number] Right 1 Implanted       Drains: * No LDAs found *    Findings: Right shoulder rotator cuff tear upper border subscapularis, high grade supraspinatus  Right shoulder biceps tendinopathy  Right shoulder impingement    Electronically Signed by Cecile Mckeon MD on 11/3/2020 at 11:52 AM

## 2020-11-03 NOTE — INTERVAL H&P NOTE
Update History & Physical 
 
The Patient's History and Physical of October 14, 2020  
 was reviewed with the patient and I examined the patient. There was no change. The surgical site was confirmed by the patient and me. Plan:  The risk, benefits, expected outcome, and alternative to the recommended procedure have been discussed with the patient. Patient understands and wants to proceed with the procedure.  
 
Electronically signed by Nicolle Samuel MD on 11/3/2020 at 9:15 AM

## 2020-11-03 NOTE — ANESTHESIA PROCEDURE NOTES
Peripheral Block    Start time: 11/3/2020 9:32 AM  End time: 11/3/2020 9:42 AM  Performed by: Annabelle Chavez MD  Authorized by: Annabelle Chavez MD       Pre-procedure:    Indications: at surgeon's request and post-op pain management    Preanesthetic Checklist: patient identified, risks and benefits discussed, site marked, timeout performed, anesthesia consent given and patient being monitored    Timeout Time: 09:32          Block Type:   Block Type:  Brachial plexus  Laterality:  Right  Monitoring:  Standard ASA monitoring, continuous pulse ox, frequent vital sign checks, heart rate, responsive to questions and oxygen  Injection Technique:  Single shot  Procedures: ultrasound guided and nerve stimulator    Patient Position: seated  Prep: alcohol    Location:  Interscalene  Needle Type:  Stimuplex  Needle Gauge:  22 G  Needle Localization:  Nerve stimulator and ultrasound guidance  Motor Response comment:   Motor Response: minimal motor response >0.4 mA   Medication Injected:  Ropivacaine (PF) (NAROPIN)(0.5%) 5 mg/mL injection, 30 mL  Med Admin Time: 11/3/2020 9:42 AM    Assessment:  Number of attempts:  1  Injection Assessment:  Incremental injection every 5 mL, local visualized surrounding nerve on ultrasound, negative aspiration for blood, negative aspiration for CSF, no paresthesia, no intravascular symptoms and ultrasound image on chart  Patient tolerance:  Patient tolerated the procedure well with no immediate complications

## 2020-11-03 NOTE — ANESTHESIA PREPROCEDURE EVALUATION
Relevant Problems   No relevant active problems       Anesthetic History   No history of anesthetic complications            Review of Systems / Medical History  Patient summary reviewed, nursing notes reviewed and pertinent labs reviewed    Pulmonary        Sleep apnea: No treatment           Neuro/Psych   Within defined limits           Cardiovascular    Hypertension                   GI/Hepatic/Renal     GERD (occasional)           Endo/Other        Obesity and arthritis     Other Findings            Physical Exam    Airway  Mallampati: II  TM Distance: 4 - 6 cm  Neck ROM: normal range of motion   Mouth opening: Normal     Cardiovascular    Rhythm: regular           Dental    Dentition: Lower dentition intact and Upper dentition intact     Pulmonary  Breath sounds clear to auscultation               Abdominal  GI exam deferred       Other Findings            Anesthetic Plan    ASA: 2  Anesthesia type: general and regional - interscalene block            Anesthetic plan and risks discussed with: Patient

## 2020-11-05 ENCOUNTER — TELEPHONE (OUTPATIENT)
Dept: ORTHOPEDIC SURGERY | Age: 55
End: 2020-11-05

## 2020-11-05 NOTE — TELEPHONE ENCOUNTER
Called patient to reschedule his 11/11 appt to a later time, while on the call he had some questions about his wound dressing and when he should change it.  Please give patient a call back at [phone]

## 2020-11-05 NOTE — TELEPHONE ENCOUNTER
Called and spoke to patient. Made him aware of the recommendation per Meli. Patient verbalized understanding and thanked writer for the call.

## 2020-11-10 DIAGNOSIS — G89.18 PAIN FOLLOWING SURGERY OR PROCEDURE: ICD-10-CM

## 2020-11-10 RX ORDER — OXYCODONE HYDROCHLORIDE 5 MG/1
5 TABLET ORAL
Qty: 35 TAB | Refills: 0 | Status: SHIPPED | OUTPATIENT
Start: 2020-11-10 | End: 2020-11-18 | Stop reason: SDUPTHER

## 2020-11-11 ENCOUNTER — OFFICE VISIT (OUTPATIENT)
Dept: ORTHOPEDIC SURGERY | Age: 55
End: 2020-11-11
Payer: COMMERCIAL

## 2020-11-11 VITALS
HEIGHT: 71 IN | SYSTOLIC BLOOD PRESSURE: 143 MMHG | BODY MASS INDEX: 30.1 KG/M2 | HEART RATE: 85 BPM | RESPIRATION RATE: 18 BRPM | WEIGHT: 215 LBS | TEMPERATURE: 97.5 F | DIASTOLIC BLOOD PRESSURE: 83 MMHG | OXYGEN SATURATION: 99 %

## 2020-11-11 DIAGNOSIS — Z98.890 STATUS POST ARTHROSCOPY OF RIGHT SHOULDER: ICD-10-CM

## 2020-11-11 DIAGNOSIS — S46.101A INJURY OF TENDON OF LONG HEAD OF RIGHT BICEPS, INITIAL ENCOUNTER: ICD-10-CM

## 2020-11-11 DIAGNOSIS — M75.111 INCOMPLETE ROTATOR CUFF TEAR OR RUPTURE OF RIGHT SHOULDER, NOT SPECIFIED AS TRAUMATIC: Primary | ICD-10-CM

## 2020-11-11 PROCEDURE — 99024 POSTOP FOLLOW-UP VISIT: CPT | Performed by: ORTHOPAEDIC SURGERY

## 2020-11-18 DIAGNOSIS — G89.18 PAIN FOLLOWING SURGERY OR PROCEDURE: ICD-10-CM

## 2020-11-18 RX ORDER — OXYCODONE HYDROCHLORIDE 5 MG/1
5 TABLET ORAL
Qty: 35 TAB | Refills: 0 | Status: SHIPPED | OUTPATIENT
Start: 2020-11-18 | End: 2020-11-24 | Stop reason: SDUPTHER

## 2020-11-18 NOTE — TELEPHONE ENCOUNTER
Last Visit: 11/11/20 with MONA Nieto  Next Appointment: [place] to follow-up in 3 weeks  Previous Refill Encounter(s): 11/10/20 #35    Requested Prescriptions     Pending Prescriptions Disp Refills    oxyCODONE IR (Roxicodone) 5 mg immediate release tablet 35 Tab 0     Sig: Take 1 Tab by mouth every four (4) hours as needed for Pain for up to 7 days. Max Daily Amount: 30 mg.

## 2020-11-23 ENCOUNTER — HOSPITAL ENCOUNTER (OUTPATIENT)
Dept: PHYSICAL THERAPY | Age: 55
Discharge: HOME | End: 2020-11-23
Attending: ORTHOPAEDIC SURGERY
Payer: COMMERCIAL

## 2020-11-23 PROCEDURE — 97162 PT EVAL MOD COMPLEX 30 MIN: CPT

## 2020-11-23 PROCEDURE — 97530 THERAPEUTIC ACTIVITIES: CPT

## 2020-11-23 PROCEDURE — 97110 THERAPEUTIC EXERCISES: CPT

## 2020-11-23 NOTE — PROGRESS NOTES
[facility]              [address]        [place], [address] [place]   [phone] fax    Plan of Care/ Statement of Necessity for Physical Therapy Services  Patient name: Gwen Hoffman Start of Care: 2020   Referral source: Giovany Mcginnis : 1965    Medical Diagnosis: Right shoulder pain [M25.511]  Payor: [facility] / Plan: [facility] PPO / Product Type: PPO /  Onset Date:  DoS 11/3/2020    Treatment Diagnosis: Right Shoulder Pain s/p Surgery   Prior Hospitalization: see medical history Provider#: [id number]   Medications: Verified on Patient summary List    Comorbidities: Arthritis, HTN, Right Elbow Surgery Veronia Sandie ), Lumbar Surgery x2, Right Knee Menisectomy (DoS ), HTN   Prior Level of Function: RHD, (I) Functional ADLs, Disabled (does not work), (I) Self-Care ADLs, (I) Driving     The Plan of Care and following information is based on the information from the initial evaluation. Assessment:    Patient presents s/p right shoulder arthroscopic rotator cuff repair, subscapular repair, biceps tenodesis and subacromial decompression (DoS 11/3/2020). Patient presents with a normal neurovascular screen and appropriate post-surgical recovery. Review of post-surgical education and importance of maintain post-surgical precautions. Patient to be progressed in accordance with post-surgical protocol, MD discretion and to patient tolerance to optimize post-surgical recovery.      Patient will continue to benefit from skilled PT services to modify and progress therapeutic interventions, address functional mobility deficits, address ROM deficits, address strength deficits, analyze and address soft tissue restrictions, analyze and cue movement patterns, analyze and modify body mechanics/ergonomics and assess and modify postural abnormalities to attain remaining goals.      Key Information:    Surgical Incision: Arthroscopic incisions visualized with appropriate healing without active nor dried drainage  Neurovascular Screen: Intact gross sensation to light touch, 2+ Radial Pulse, Intact gross  strength     ROM:  []? Unable to assess at this time                                             AROM                                                      PROM    Left Right   Right   Flexion 144 NT Flexion 100   Extension 50 NT Extension     Abduction 128 NT Abduction 95   Functional ER C5 NT ER @ 90 Degrees 20 (45 deg abd)   Functional IR T10 NT IR @ 90 Degrees 30 (45 deg abd)    Right Elbow PROM: 0-128 deg    Evaluation Complexity History MEDIUM  Complexity : 1-2 comorbidities / personal factors will impact the outcome/ POC ; Examination MEDIUM Complexity : 3 Standardized tests and measures addressing body structure, function, activity limitation and / or participation in recreation  ;Presentation MEDIUM Complexity : Evolving with changing characteristics  ; Clinical Decision Making MEDIUM Complexity : FOTO score of 26-74  Overall Complexity Rating: MEDIUM  Problem List: pain affecting function, decrease ROM, decrease strength, decrease ADL/ functional abilitiies, decrease activity tolerance and decrease flexibility/ joint mobility   Treatment Plan may include any combination of the following: Therapeutic exercise, Therapeutic activities, Neuromuscular re-education, Physical agent/modality, Manual therapy, Patient education, Self Care training, Functional mobility training and Home safety training  Patient / Family readiness to learn indicated by: asking questions, trying to perform skills and interest  Persons(s) to be included in education: patient (P)  Barriers to Learning/Limitations: None  Patient Goal (s): Get back to 100%  Patient Self Reported Health Status: good  Rehabilitation Potential: good    Short Term Goals: To be accomplished in 3 weeks:  1. Patient will subjectively report full compliance with prescribed HEP.   Eval: HEP provided  2. Patient will demonstrate right shoulder flexion and abduction PROM >/= 140 degrees to improve ease with dressing. Eval: Right Shoulder Flexion PROM 100 deg, Right Shoulder Abduction PROM 95 deg  3. Patient will demonstrate right shoulder IR (90 deg abduction) PROM >/= 40 degrees to improve ease with self-care ADLs. Eval: Right Shoulder IR (45 deg abduction) = 30 deg    Long Term Goals: To be accomplished in 6 weeks:  1. Patient will demonstrate a significant functional improvement as demonstrated by a score of >/= 65 on FOTO. Eval: FOTO = 44       2. Patient will demonstrate right shoulder flexion and abduction AROM >/= 140 degrees to improve ease with overhead reach. Eval: NT per protocol  3. Patient will demonstrate right shoulder flexion and abduction MMT >/= 4+/5 to improve ease with functional lifting. Eval: NT per protocol    Frequency / Duration: Patient to be seen 2 times per week for 6 weeks. Patient/ Caregiver education and instruction: Diagnosis, prognosis, self care, activity modification, brace/ splint application and exercises   [x]  Plan of care has been reviewed with RAYSHAWN Martinez, PT 11/23/2020 7:05 AM  ________________________________________________________________________    I certify that the above Therapy Services are being furnished while the patient is under my care. I agree with the treatment plan and certify that this therapy is necessary.     Physician's Signature:____________Date:_________TIME:________    ** Signature, Date and Time must be completed for valid certification **  Please sign and return to [facility] [address]              [address] [place], [address] [place]   [phone] fax

## 2020-11-23 NOTE — PROGRESS NOTES
PT DAILY TREATMENT NOTE     Patient Name: Neville Bell  Date:2020  : 1965  [x]  Patient  Verified  Payor: BLUE CROSS / Plan: [id number] PPO / Product Type: PPO /    In time:930  Out time:1008  Total Treatment Time (min):38  Visit #: 1 of 12    Medicare/BCBS Only   Total Timed Codes (min):  23 1:1 Treatment Time:  38       Treatment Area: Right shoulder pain [M25.511]    Physical Therapy Evaluation - Shoulder    SUBJECTIVE      Any medication changes, allergies to medications, adverse drug reactions, diagnosis change, or new procedure performed?: [x] No    [] Yes (see summary sheet for update)    Subjective functional status/changes:     PLOF:RHD, (I) Functional ADLs, Disabled (does not work), (I) Self-Care ADLs, (I) Driving  Current Functional Status: Limited community driving, Assistance with self-care ADLs, Assistance with household ADLs  Work Hx: Disability - Arthritis   Living Situation: Lives with family   Comorbidities: Arthritis, HTN, Right Elbow Surgery (Dos ), Lumbar Surgery x2, Right Knee Menisectomy (DoS ), HTN  Medications: Oxycodone (PRN)    Subjective: Patient presents s/p right shoulder arthroscopic rotator cuff repair, subscapularis repair, biceps tenodesis and subacromial decompression (DoS 11/3/2020). Pre-surgically patient with subjective history of right shoulder pain x8-9 months without specific injury nor trauma. Patient denies the following: fever/chills/night sweats, incisional drainage, N/T, hand swelling chest pain/shortness of breath. Upon last physician follow up 2020 patient advised to discharge use of sling week of 2020 with initiation of PT services at this time. Patient reports use of UltraSling with abductor pillow . Pain Intensity (0-10, VAS): Current 0, Worst 5-6, Best 0    Patient Goals: \"Get back to 100%. \"    OBJECTIVE EXAMINATION    Posture:  With use of UltraSling with Abductor Pillow     Surgical Incision: Arthroscopic incisions visualized with appropriate healing without active nor dried drainage  Neurovascular Screen: Intact gross sensation to light touch, 2+ Radial Pulse, Intact gross  strength    ROM:  [] Unable to assess at this time                                             AROM                                                      PROM   Left Right  Right   Flexion 144 NT Flexion 100   Extension 50 NT Extension    Abduction 128 NT Abduction 95   Functional ER C5 NT ER @ 90 Degrees 20 (45 deg abd)   Functional IR T10 NT IR @ 90 Degrees 30 (45 deg abd)    Right Elbow PROM: 0-128 deg    Strength:   [] Unable to assess at this time                                                                            L (1-5) R (1-5)   Flexors 5 NT   Abductors 5 NT   External Rotators 5 NT   Internal Rotators 5 NT   Extensors 5 NT   Elbow Flexion 5 NT   Elbow Extension 5 NT     OBJECTIVE    15 min [x]Eval                  []Re-Eval     15 min Therapeutic Exercise:  [x] See flow sheet : Patient educated regarding completion of prescribed HEP and provided with written HEP instructions, Patient educated regarding diagnosis and PT POC    Supine, Right Shoulder PROM Grade I-III - Flexion, Scaption, Abduction, ER (45 deg abd - to 20 deg), IR (45 deg abd - to 30 deg)   Rationale: increase ROM and increase strength to improve the patients ability to improve ease with functional ADLs    8 min Therapeutic Activity:  [x]  See flow sheet : Review of post-surgical precautions, Review of sling wear/donning. doffing, Review of modifications of self-care ADLs with maintenance of right shoulder post-surgical precautions   Rationale: increase ROM and increase strength  to improve the patients ability to improve post-surgical recovery           With   [] TE   [] TA   [] neuro   [] other: Patient Education: [x] Review HEP    [] Progressed/Changed HEP based on:   [] positioning   [] body mechanics   [] transfers   [] heat/ice application [] other:      Other Objective/Functional Measures: See objective above. Pain Level (0-10 scale) post treatment: 0    ASSESSMENT/Changes in Function: Patient presents s/p right shoulder arthroscopic rotator cuff repair, subscapular repair, biceps tenodesis and subacromial decompression (DoS 11/3/2020). Patient presents with a normal neurovascular screen and appropriate post-surgical recovery. Review of post-surgical education and importance of maintain post-surgical precautions. Patient to be progressed in accordance with post-surgical protocol, MD discretion and to patient tolerance to optimize post-surgical recovery. Patient will continue to benefit from skilled PT services to modify and progress therapeutic interventions, address functional mobility deficits, address ROM deficits, address strength deficits, analyze and address soft tissue restrictions, analyze and cue movement patterns, analyze and modify body mechanics/ergonomics and assess and modify postural abnormalities to attain remaining goals. [x]  See Plan of Care  []  See progress note/recertification  []  See Discharge Summary         Progress towards goals / Updated goals:    Short Term Goals: To be accomplished in 3 weeks:  1. Patient will subjectively report full compliance with prescribed HEP. Eval: HEP provided  2. Patient will demonstrate right shoulder flexion and abduction PROM >/= 140 degrees to improve ease with dressing. Eval: Right Shoulder Flexion PROM 100 deg, Right Shoulder Abduction PROM 95 deg  3. Patient will demonstrate right shoulder IR (90 deg abduction) PROM >/= 40 degrees to improve ease with self-care ADLs. Eval: Right Shoulder IR (45 deg abduction) = 30 deg    Long Term Goals: To be accomplished in 6 weeks:  1. Patient will demonstrate a significant functional improvement as demonstrated by a score of >/= 65 on FOTO. Eval: FOTO = 44       2.  Patient will demonstrate right shoulder flexion and abduction AROM >/= 140 degrees to improve ease with overhead reach. Eval: NT per protocol  3. Patient will demonstrate right shoulder flexion and abduction MMT >/= 4+/5 to improve ease with functional lifting.   Eval: NT per protocol    PLAN  [x]  Upgrade activities as tolerated     []  Continue plan of care  []  Update interventions per flow sheet       []  Discharge due to:_  []  Other:_      Emerita Garcia PT 11/23/2020  7:03 AM    Future Appointments   Date Time Provider Manoj Caruso   11/23/2020  9:30 AM Josh Dorman, PT [facility]

## 2020-11-24 ENCOUNTER — HOSPITAL ENCOUNTER (OUTPATIENT)
Dept: PHYSICAL THERAPY | Age: 55
Discharge: HOME | End: 2020-11-24
Attending: ORTHOPAEDIC SURGERY
Payer: COMMERCIAL

## 2020-11-24 DIAGNOSIS — G89.18 PAIN FOLLOWING SURGERY OR PROCEDURE: ICD-10-CM

## 2020-11-24 PROCEDURE — 97110 THERAPEUTIC EXERCISES: CPT

## 2020-11-24 PROCEDURE — 97140 MANUAL THERAPY 1/> REGIONS: CPT

## 2020-11-24 RX ORDER — OXYCODONE HYDROCHLORIDE 5 MG/1
5 TABLET ORAL
Qty: 35 TAB | Refills: 0 | Status: SHIPPED | OUTPATIENT
Start: 2020-11-24 | End: 2020-12-01 | Stop reason: SDUPTHER

## 2020-11-24 NOTE — TELEPHONE ENCOUNTER
[facility]18/20 for a 6 d/s. Last Visit: 11/11/20 with MONA Knott  Next Appointment: [place] to follow-up in 3 weeks  Previous Refill Encounter(s): 11/18/20 #35    Requested Prescriptions     Pending Prescriptions Disp Refills    oxyCODONE IR (Roxicodone) 5 mg immediate release tablet 35 Tab 0     Sig: Take 1 Tab by mouth every four (4) hours as needed for Pain for up to 7 days. Max Daily Amount: 30 mg.

## 2020-11-24 NOTE — PROGRESS NOTES
PT DAILY TREATMENT NOTE     Patient Name: Mela English  Date:2020  : 1965  [x]  Patient  Verified  Payor: BLUE CROSS / Plan: [facility] [id number] PPO / Product Type: PPO /    In time:724  Out time:806  Total Treatment Time (min): 42  Visit #: 2 of 12    Medicare/BCBS Only   Total Timed Codes (min):  32 1:1 Treatment Time:  32       Treatment Area: Right shoulder pain [M25.511]    SUBJECTIVE  Pain Level (0-10 scale): 5  Any medication changes, allergies to medications, adverse drug reactions, diagnosis change, or new procedure performed?: [x] No    [] Yes (see summary sheet for update)  Subjective functional status/changes:   [] No changes reported  Patient reports non-initiation of HEP. OBJECTIVE    Modality rationale: decrease inflammation and decrease pain to improve the patients ability to improve ease with sleep   Min Type Additional Details   10 [x]  Ice     []  heat  []  Ice massage Position: Seated  Location: Right Shoulder, Post-tx     17 min Therapeutic Exercise:  [x] See flow sheet : Emphasis placed on promotion of right shoulder PROM and maintenance of elbow/wrist/hand PROM/AROM and strength   Rationale: increase ROM and increase strength to improve the patients ability to improve post-surgical recovery. 15 min Manual Therapy:    Supine, Right Shoulder Passive Physiological Grade II-III Mobilization - Flexion, Scaption, Abduction   The manual therapy interventions were performed at a separate and distinct time from the therapeutic activities interventions. Rationale: decrease pain, increase ROM and increase tissue extensibility to improve ease with dressing.           With   [] TE   [] TA   [] neuro   [] other: Patient Education: [x] Review HEP    [] Progressed/Changed HEP based on:   [] positioning   [] body mechanics   [] transfers   [] heat/ice application    [] other:      Other Objective/Functional Measures:   Right Shoulder Flexion PROM 110 deg, Right Shoulder Abduction PROM 105 deg     Pain Level (0-10 scale) post treatment: 0    ASSESSMENT/Changes in Function: Initiated exercises per plan of care with performance of shoulder PROM until clarification from PA regarding appropriate post-surgical progression. Further verbal reminder provided regarding post-surgical precautions to optimize post-surgical success. Patient will continue to benefit from skilled PT services to modify and progress therapeutic interventions, address functional mobility deficits, address ROM deficits, address strength deficits, analyze and address soft tissue restrictions, analyze and cue movement patterns, analyze and modify body mechanics/ergonomics and assess and modify postural abnormalities to attain remaining goals. []  See Plan of Care  []  See progress note/recertification  []  See Discharge Summary         Progress towards goals / Updated goals:    Short Term Goals: To be accomplished in 3 weeks:  1. Patient will subjectively report full compliance with prescribed HEP. Eval: HEP provided  Current: Remains, HEP not initiated, 11/24/2020  2. Patient will demonstrate right shoulder flexion and abduction PROM >/= 140 degrees to improve ease with dressing. Eval: Right Shoulder Flexion PROM 100 deg, Right Shoulder Abduction PROM 95 deg  Current: Progressing, Right Shoulder Flexion PROM 110 deg, Right Shoulder Abduction PROM 105 deg, 11/24/2020  3. Patient will demonstrate right shoulder IR (90 deg abduction) PROM >/= 40 degrees to improve ease with self-care ADLs. Eval: Right Shoulder IR (45 deg abduction) = 30 deg     Long Term Goals: To be accomplished in 6 weeks:  1. Patient will demonstrate a significant functional improvement as demonstrated by a score of >/= 65 on FOTO. Eval: FOTO = 44       2. Patient will demonstrate right shoulder flexion and abduction AROM >/= 140 degrees to improve ease with overhead reach. Eval: NT per protocol  3.  Patient will demonstrate right shoulder flexion and abduction MMT >/= 4+/5 to improve ease with functional lifting.   Eval: NT per protocol       PLAN  [x]  Upgrade activities as tolerated     [x]  Continue plan of care  []  Update interventions per flow sheet       []  Discharge due to:_  []  Other:_      Jona Noe, PT 11/24/2020  7:24 AM    Future Appointments   Date Time Provider Manoj Zuly   11/24/2020  2:00 PM Marcel Suzao, PT [facility]   12/1/2020  7:15 AM rCaig Knight, PTA [facility]   12/3/2020  8:45 AM Craig Knight, PTA [facility]   12/8/2020  8:45 AM Craig Knight, PTA [facility]   12/10/2020  8:45 AM Craig Knight, PTA [facility]   12/15/2020  9:30 AM Hardraissa Burrell, PT [facility]   12/17/2020  9:30 AM Craig Knight, PTA [facility]   12/22/2020  9:30 AM Kwadwo Burrell, PT [facility]   12/24/2020  8:45 AM Craig Knight, PTA [facility]   12/29/2020  8:45 AM Harden Burrell, PT [facility]   12/31/2020 10:15 AM Harden Burrell, PT [facility]

## 2020-12-01 ENCOUNTER — HOSPITAL ENCOUNTER (OUTPATIENT)
Dept: PHYSICAL THERAPY | Age: 55
Discharge: HOME | End: 2020-12-01
Attending: ORTHOPAEDIC SURGERY
Payer: COMMERCIAL

## 2020-12-01 DIAGNOSIS — G89.18 PAIN FOLLOWING SURGERY OR PROCEDURE: ICD-10-CM

## 2020-12-01 PROCEDURE — 97110 THERAPEUTIC EXERCISES: CPT

## 2020-12-01 PROCEDURE — 97140 MANUAL THERAPY 1/> REGIONS: CPT

## 2020-12-01 RX ORDER — OXYCODONE HYDROCHLORIDE 5 MG/1
5 TABLET ORAL
Qty: 21 TAB | Refills: 0 | Status: SHIPPED | OUTPATIENT
Start: 2020-12-01 | End: 2020-12-09 | Stop reason: SDUPTHER

## 2020-12-01 NOTE — TELEPHONE ENCOUNTER
[facility]odone as 11/24/20 for a 6 d/s. Last Visit: 11/11/20 with MONA Saldana  Next Appointment: Javid Schuler to follow-up in 3 weeks  Previous Refill Encounter(s): 11/24/20 #35    Requested Prescriptions     Pending Prescriptions Disp Refills    oxyCODONE IR (Roxicodone) 5 mg immediate release tablet 35 Tab 0     Sig: Take 1 Tab by mouth every four (4) hours as needed for Pain for up to 7 days. Max Daily Amount: 30 mg.

## 2020-12-01 NOTE — PROGRESS NOTES
PT DAILY TREATMENT NOTE     Patient Name: Carley Cook  Date:2020  : 1965  [x]  Patient  Verified  Payor: BLUE CROSS / Plan: [facility] PPO / Product Type: PPO /    In time:7:17  Out time:7:59  Total Treatment Time (min): 42  Visit #: 3 of 12    Medicare/BCBS Only   Total Timed Codes (min):  32 1:1 Treatment Time:  32       Treatment Area: Right shoulder pain [M25.511]    SUBJECTIVE  Pain Level (0-10 scale): 4   Any medication changes, allergies to medications, adverse drug reactions, diagnosis change, or new procedure performed?: [x] No    [] Yes (see summary sheet for update)  Subjective functional status/changes:   [] No changes reported  Pt reports he has not noticed an increase in pain since not wearing the sling. OBJECTIVE            Modality rationale: decrease inflammation and decrease pain to improve the patients ability to improve ease with sleep   Min Type Additional Details    10 [x]? Ice     []?  heat  []? Ice massage Position: Seated  Location: Right Shoulder, Post-tx       27 min Therapeutic Exercise:  [x]? See flow sheet : Emphasis placed on promotion of right shoulder PROM and maintenance of elbow/wrist/hand PROM/AROM and strength   Rationale: increase ROM and increase strength to improve the patients ability to improve post-surgical recovery.    15 min Manual Therapy:    Supine, Right Shoulder PROM - Flexion, Scaption, Abduction  Supine, right PROM of elbow    The manual therapy interventions were performed at a separate and distinct time from the therapeutic activities interventions. Rationale: decrease pain, increase ROM and increase tissue extensibility to improve ease with dressing.         With   [] TE   [] TA   [] neuro   [] other: Patient Education: [x] Review HEP    [] Progressed/Changed HEP based on:   [] positioning   [] body mechanics   [] transfers   [] heat/ice application    [] other:      Other Objective/Functional Measures: Pt reports performing HEP. Right Shoulder Flexion PROM 146 deg, Right Shoulder Abduction PROM 130 deg,     Pain Level (0-10 scale) post treatment: 0    ASSESSMENT/Changes in Function: Pt arrived to therapy with no sling on with no complaints of having increase in pain with not using the sling. Pt is progressing well with PROM in all planes. Educated pt to avoid lifting, reaching, and carrying objects with right UE. Patient will continue to benefit from skilled PT services to modify and progress therapeutic interventions, address functional mobility deficits, address ROM deficits, address strength deficits and analyze and address soft tissue restrictions to attain remaining goals. []  See Plan of Care  []  See progress note/recertification  []  See Discharge Summary         Progress towards goals / Updated goals:  Short Term Goals: To be accomplished in 3 weeks:  1. Patient will subjectively report full compliance with prescribed HEP. Eval: HEP provided  Current:Goal met: Pt reports performing HEP. 12/1/20  2. Patient will demonstrate right shoulder flexion and abduction PROM >/= 140 degrees to improve ease with dressing. Eval: Right Shoulder Flexion PROM 100 deg, Right Shoulder Abduction PROM 95 deg  Current: Progressing, Right Shoulder Flexion PROM 146 deg, Right Shoulder Abduction PROM 130 deg, 11/30/2020  3. Patient will demonstrate right shoulder IR (90 deg abduction) PROM >/= 40 degrees to improve ease with self-care ADLs. Eval: Right Shoulder IR (45 deg abduction) = 30 deg     Long Term Goals: To be accomplished in 6 weeks:  1. Patient will demonstrate a significant functional improvement as demonstrated by a score of >/= 65 on FOTO. Eval: FOTO = 44       2. Patient will demonstrate right shoulder flexion and abduction AROM >/= 140 degrees to improve ease with overhead reach. Eval: NT per protocol  3.  Patient will demonstrate right shoulder flexion and abduction MMT >/= 4+/5 to improve ease with functional lifting.   Eval: NT per protocol    PLAN  []  Upgrade activities as tolerated     [x]  Continue plan of care  []  Update interventions per flow sheet       []  Discharge due to:_  []  Other:_      Lili Luis, PTA 12/1/2020  7:17 AM    Future Appointments   Date Time Provider Manoj Caruso   12/3/2020  8:45 AM Mac Pollen, PTA [facility]   12/8/2020  8:45 AM Mac Pollen, PTA [facility]   12/10/2020  8:45 AM Mac Pollen, PTA [facility]   12/15/2020  9:30 AM Santos Canary, PT [facility]   12/17/2020  9:30 AM Mac Pollen, PTA [facility]   12/22/2020  9:30 AM Santos Canary, PT [facility]   12/24/2020  8:45 AM Mac Pollen, PTA [facility]   12/29/2020  8:45 AM Santos Canary, PT [facility]   12/31/2020 10:15 AM Santos Canary, PT [facility]

## 2020-12-03 ENCOUNTER — HOSPITAL ENCOUNTER (OUTPATIENT)
Dept: PHYSICAL THERAPY | Age: 55
Discharge: HOME | End: 2020-12-03
Attending: ORTHOPAEDIC SURGERY
Payer: COMMERCIAL

## 2020-12-03 PROCEDURE — 97110 THERAPEUTIC EXERCISES: CPT

## 2020-12-03 PROCEDURE — 97140 MANUAL THERAPY 1/> REGIONS: CPT

## 2020-12-03 NOTE — PROGRESS NOTES
PT DAILY TREATMENT NOTE     Patient Name: Sb Menjivar  Date:12/3/2020  : 1965  [x]  Patient  Verified  Payor: BLUE CROSS / Plan: [facility] PPO / Product Type: PPO /    In time:8:55  Out time:9:33  Total Treatment Time (min): 38  Visit #: 4 of 12    Medicare/BCBS Only   Total Timed Codes (min):  28 1:1 Treatment Time:  28       Treatment Area: Right shoulder pain [M25.511]    SUBJECTIVE  Pain Level (0-10 scale): 0  Any medication changes, allergies to medications, adverse drug reactions, diagnosis change, or new procedure performed?: [x] No    [] Yes (see summary sheet for update)  Subjective functional status/changes:   [] No changes reported  Pt reports he has been able to drive without having increase in pain. OBJECTIVE                 Modality rationale: decrease inflammation and decrease pain to improve the patients ability to improve ease with sleep   Min Type Additional Details     10 [x]? ?  Ice     []? ?  heat  []? ?  Ice massage Position: Seated  Location: Right Shoulder, Post-tx        18 min Therapeutic Exercise:  [x]? ? See flow sheet : Emphasis placed on promotion of right shoulder PROM and maintenance of elbow/wrist/hand PROM/AROM and strength   Rationale: increase ROM and increase strength to improve the patients ability to improve post-surgical recovery.     10 min Manual Therapy:    Supine, Right Shoulder PROM - Flexion, Scaption, Abduction  Supine, right PROM of elbow    The manual therapy interventions were performed at a separate and distinct time from the therapeutic activities interventions. Rationale: decrease pain, increase ROM and increase tissue extensibility to improve ease with dressing.         With   [] TE   [] TA   [] neuro   [] other: Patient Education: [x] Review HEP    [] Progressed/Changed HEP based on:   [] positioning   [] body mechanics   [] transfers   [] heat/ice application    [] other:      Other Objective/Functional Measures: PROM flexion ~135 deg. Pain Level (0-10 scale) post treatment: 0    ASSESSMENT/Changes in Function:Pt continues to progress well with PROM in all planes. Remind patient to avoid lifting and reaching with right UE. Patient will continue to benefit from skilled PT services to modify and progress therapeutic interventions, address functional mobility deficits, address ROM deficits, address strength deficits and analyze and address soft tissue restrictions to attain remaining goals. []  See Plan of Care  []  See progress note/recertification  []  See Discharge Summary         Progress towards goals / Updated goals:  *Short Term Goals: To be accomplished in 3 weeks:  1. Patient will subjectively report full compliance with prescribed HEP. Eval: HEP provided  Current:Goal met: Pt reports performing HEP. 12/1/20  2. Patient will demonstrate right shoulder flexion and abduction PROM >/= 140 degrees to improve ease with dressing. Eval: Right Shoulder Flexion PROM 100 deg, Right Shoulder Abduction PROM 95 deg  Current: Progressing, Right Shoulder Flexion PROM 146 deg, Right Shoulder Abduction PROM 130 deg, 12/2/2020  3. Patient will demonstrate right shoulder IR (90 deg abduction) PROM >/= 40 degrees to improve ease with self-care ADLs. Eval: Right Shoulder IR (45 deg abduction) = 30 deg     Long Term Goals: To be accomplished in 6 weeks:  1. Patient will demonstrate a significant functional improvement as demonstrated by a score of >/= 65 on FOTO. Eval: FOTO = 44       2. Patient will demonstrate right shoulder flexion and abduction AROM >/= 140 degrees to improve ease with overhead reach. Eval: NT per protocol  3. Patient will demonstrate right shoulder flexion and abduction MMT >/= 4+/5 to improve ease with functional lifting.   Eval: NT per protocol       PLAN  []  Upgrade activities as tolerated     [x]  Continue plan of care  []  Update interventions per flow sheet       []  Discharge due to:_  []  Other:_ Matti Cobos, RAYSHAWN 12/3/2020  8:57 AM    Future Appointments   Date Time Provider Manoj Bondsi   12/8/2020  8:45 AM Keyana Wright [facility]   12/10/2020  8:45 AM Wen Ricketts [facility]   12/15/2020  9:30 AM Froylan Galleogs, PT [facility]   12/17/2020  9:30 AM Sarkis Angel PTA [facility]   12/22/2020  9:30 AM Froylan Gallegos, PT [facility]   12/24/2020  8:45 AM Sarkis Angel, PTA [facility]   12/29/2020  8:45 AM Froylan Gallegos, PT [facility]   12/31/2020 10:15 AM Froylan Gallegos, PT [facility]

## 2020-12-08 ENCOUNTER — HOSPITAL ENCOUNTER (OUTPATIENT)
Dept: PHYSICAL THERAPY | Age: 55
Discharge: HOME | End: 2020-12-08
Attending: ORTHOPAEDIC SURGERY
Payer: COMMERCIAL

## 2020-12-08 PROCEDURE — 97140 MANUAL THERAPY 1/> REGIONS: CPT

## 2020-12-08 PROCEDURE — 97110 THERAPEUTIC EXERCISES: CPT

## 2020-12-08 NOTE — PROGRESS NOTES
PT DAILY TREATMENT NOTE     Patient Name: Sancho Summers  Date:2020  : 1965  [x]  Patient  Verified  Payor: BLUE CROSS / Plan: [facility] PPO / Product Type: PPO /    In time:8:49  Out time:9:33  Total Treatment Time (min): 44  Visit #: 5 of 12    Medicare/BCBS Only   Total Timed Codes (min):  34 1:1 Treatment Time:  34       Treatment Area: Right shoulder pain [M25.511]    SUBJECTIVE  Pain Level (0-10 scale): 4  Any medication changes, allergies to medications, adverse drug reactions, diagnosis change, or new procedure performed?: [x] No    [] Yes (see summary sheet for update)  Subjective functional status/changes:   [] No changes reported  Pt reports he had no pain in his shoulder over the weekend. OBJECTIVE              Modality rationale: decrease inflammation and decrease pain to improve the patients ability to improve ease with sleep   Min Type Additional Details     10 [x]? ??  Ice     []? ??  heat  []? ??  Ice massage Position: Seated  Location: Right Shoulder, Post-tx        24 min Therapeutic Exercise:  [x]? ?? See flow sheet : Emphasis placed on promotion of right shoulder PROM and maintenance of elbow/wrist/hand PROM/AROM and strength   Rationale: increase ROM and increase strength to improve the patients ability to improve post-surgical recovery.     10 min Manual Therapy:    Supine, Right Shoulder PROM - Flexion, Scaption, Abduction, ER/IR  Supine, right PROM of elbow    The manual therapy interventions were performed at a separate and distinct time from the therapeutic activities interventions. Rationale: decrease pain, increase ROM and increase tissue extensibility to improve ease with dressing.             With   [] TE   [] TA   [] neuro   [] other: Patient Education: [x] Review HEP    [] Progressed/Changed HEP based on:   [] positioning   [] body mechanics   [] transfers   [] heat/ice application    [] other:      Other Objective/Functional Measures:  Right shoulder IR  ( 45 deg abduction) = 48 deg. Pain Level (0-10 scale) post treatment: 5    ASSESSMENT/Changes in Function: Within therapy patient reaching for objects at waist height and reaches behind his back to pull down his shirt with right UE. Educated pt to avoid using right UE with reaching, lifting, or reaching behind his back to follow post op protocol. Patient will continue to benefit from skilled PT services to modify and progress therapeutic interventions, address functional mobility deficits, address ROM deficits, address strength deficits and analyze and address soft tissue restrictions to attain remaining goals. []  See Plan of Care  []  See progress note/recertification  []  See Discharge Summary         Progress towards goals / Updated goals:  Short Term Goals: To be accomplished in 3 weeks:  1. Patient will subjectively report full compliance with prescribed HEP. Eval: HEP provided  Current:Goal met: Pt reports performing HEP. 12/1/20  2. Patient will demonstrate right shoulder flexion and abduction PROM >/= 140 degrees to improve ease with dressing. Eval: Right Shoulder Flexion PROM 100 deg, Right Shoulder Abduction PROM 95 deg  Current: Progressing, Right Shoulder Flexion PROM 146 deg, Right Shoulder Abduction PROM 130 deg, 12/2/2020  3. Patient will demonstrate right shoulder IR (90 deg abduction) PROM >/= 40 degrees to improve ease with self-care ADLs. Eval: Right Shoulder IR (45 deg abduction) = 30 deg  Current: Goal met: Right shoulder IR  ( 45 deg abduction) = 48 deg. 12/8/20     Long Term Goals: To be accomplished in 6 weeks:  1. Patient will demonstrate a significant functional improvement as demonstrated by a score of >/= 65 on FOTO. Eval: FOTO = 44       2. Patient will demonstrate right shoulder flexion and abduction AROM >/= 140 degrees to improve ease with overhead reach. Eval: NT per protocol  3.  Patient will demonstrate right shoulder flexion and abduction MMT >/= 4+/5 to improve ease with functional lifting.   Eval: NT per protocol       PLAN  []  Upgrade activities as tolerated     [x]  Continue plan of care  []  Update interventions per flow sheet       []  Discharge due to:_  []  Other:_      Lili Smith, PTA 12/8/2020  8:52 AM    Future Appointments   Date Time Provider Manoj Caruso   12/10/2020  8:45 AM Mac Pollen, PTA [facility]   12/15/2020  9:30 AM Santos Canary, PT [facility]   12/17/2020  9:30 AM Mac Pollen, PTA [facility]   12/22/2020  9:30 AM Santos Canary, PT [facility]   12/24/2020  8:45 AM Mac Pollen, PTA [facility]   12/29/2020  8:45 AM Santos Canary, PT [facility]   12/31/2020 10:15 AM Santos Canary, PT [facility]

## 2020-12-09 DIAGNOSIS — G89.18 PAIN FOLLOWING SURGERY OR PROCEDURE: ICD-10-CM

## 2020-12-09 RX ORDER — OXYCODONE HYDROCHLORIDE 5 MG/1
5 TABLET ORAL
Qty: 21 TAB | Refills: 0 | Status: SHIPPED | OUTPATIENT
Start: 2020-12-09 | End: 2020-12-14 | Stop reason: SDUPTHER

## 2020-12-09 NOTE — TELEPHONE ENCOUNTER
[facility] R[facility]odone as 12/1/20 for a 7 d/s. Last Visit: 11/11/20 with MONA Stoner  Next Appointment: Bindu Mccoy to follow-up in 3 weeks  Previous Refill Encounter(s): 12/1/20 #21    Requested Prescriptions     Pending Prescriptions Disp Refills    oxyCODONE IR (Roxicodone) 5 mg immediate release tablet 21 Tab 0     Sig: Take 1 Tab by mouth every eight (8) hours as needed for Pain for up to 7 days. Max Daily Amount: 15 mg.

## 2020-12-10 ENCOUNTER — HOSPITAL ENCOUNTER (OUTPATIENT)
Dept: PHYSICAL THERAPY | Age: 55
Discharge: HOME | End: 2020-12-10
Attending: ORTHOPAEDIC SURGERY
Payer: COMMERCIAL

## 2020-12-10 PROCEDURE — 97140 MANUAL THERAPY 1/> REGIONS: CPT | Performed by: PHYSICAL THERAPIST

## 2020-12-10 PROCEDURE — 97110 THERAPEUTIC EXERCISES: CPT | Performed by: PHYSICAL THERAPIST

## 2020-12-10 NOTE — PROGRESS NOTES
PT DAILY TREATMENT NOTE     Patient Name: Sancho Trevino  Date:12/10/2020  : 1965  [x]  Patient  Verified  Payor: BLUE CROSS / Plan: [facility] PPO / Product Type: PPO /    In time:8:57  Out time:9:46  Total Treatment Time (min): 61  Visit #: 6 of 12    Medicare/BCBS Only   Total Timed Codes (min):  49 1:1 Treatment Time:  38       Treatment Area: Right shoulder pain [M25.511]    SUBJECTIVE  Pain Level (0-10 scale): 0  Any medication changes, allergies to medications, adverse drug reactions, diagnosis change, or new procedure performed?: [x] No    [] Yes (see summary sheet for update)  Subjective functional status/changes:   [] No changes reported  Patient reports decreased pain. States he is following all precautions. OBJECTIVE    Modality rationale: decrease inflammation and decrease pain to improve the patients ability to increase ease of motion to improve function.    Min Type Additional Details    [] Estim:  []Unatt       []IFC  []Premod                        []Other:  []w/ice   []w/heat  Position:  Location:    [] Estim: []Att    []TENS instruct  []NMES                    []Other:  []w/US   []w/ice   []w/heat  Position:  Location:    []  Traction: [] Cervical       []Lumbar                       [] Prone          []Supine                       []Intermittent   []Continuous Lbs:  [] before manual  [] after manual    []  Ultrasound: []Continuous   [] Pulsed                           []1MHz   []3MHz W/cm2:  Location:    []  Iontophoresis with dexamethasone         Location: [] Take home patch   [] In clinic   10 [x]  Ice     []  heat  []  Ice massage  []  Laser   []  Anodyne Position: sitting  Location: right shoulder    []  Laser with stim  []  Other:  Position:  Location:    []  Vasopneumatic Device Pressure:       [] lo [] med [] hi   Temperature: [] lo [] med [] hi   [] Skin assessment post-treatment:  []intact []redness- no adverse reaction    []redness  adverse reaction:       39 min Therapeutic Exercise:  [] See flow sheet :Emphasis placed on promotion of right shoulder PROM and maintenance of elbow/wrist/hand PROM/AROM and strength   Rationale: increase ROM and increase strength to improve the patients ability to increase PROM right shoulder. 10 min Manual Therapy:  Right shoulder GH mobs grad I-II distraction, anterior and posterior glides. Manual stretching to increase elevation and rotation. The manual therapy interventions were performed at a separate and distinct time from the therapeutic activities interventions. Rationale: decrease pain, increase ROM and increase tissue extensibility to increase ease of motion to improve function. With   [] TE   [] TA   [] neuro   [] other: Patient Education: [x] Review HEP    [] Progressed/Changed HEP based on:   [] positioning   [] body mechanics   [] transfers   [] heat/ice application    [] other:      Other Objective/Functional Measures: Passive motion with springy end feel to rotation and elevation. Pain Level (0-10 scale) post treatment: 0    ASSESSMENT/Changes in Function: Patient with decreased pain and improving mobility. Patient will continue to benefit from skilled PT services to modify and progress therapeutic interventions, address functional mobility deficits, address ROM deficits, address strength deficits and analyze and address soft tissue restrictions to attain remaining goals. [x]  See Plan of Care  []  See progress note/recertification  []  See Discharge Summary         Progress towards goals / Updated goals:  Short Term Goals: To be accomplished in 3 weeks:  1. Patient will subjectively report full compliance with prescribed HEP. Eval: HEP provided  Current:Goal met: Pt reports performing HEP. 12/1/20  2. Patient will demonstrate right shoulder flexion and abduction PROM >/= 140 degrees to improve ease with dressing.   Eval: Right Shoulder Flexion PROM 100 deg, Right Shoulder Abduction PROM 95 deg  Current: Progressing, Right Shoulder Flexion PROM 146 deg, Right Shoulder Abduction PROM 130 deg, 12/2/2020  3. Patient will demonstrate right shoulder IR (90 deg abduction) PROM >/= 40 degrees to improve ease with self-care ADLs. Eval: Right Shoulder IR (45 deg abduction) = 30 deg  Current: Goal met: Right shoulder IR  ( 45 deg abduction) = 48 deg. 12/8/20     Long Term Goals: To be accomplished in 6 weeks:  1. Patient will demonstrate a significant functional improvement as demonstrated by a score of >/= 65 on FOTO. Eval: FOTO = 44       2. Patient will demonstrate right shoulder flexion and abduction AROM >/= 140 degrees to improve ease with overhead reach. Eval: NT per protocol  3. Patient will demonstrate right shoulder flexion and abduction MMT >/= 4+/5 to improve ease with functional lifting.   Eval: NT per protocol    PLAN  [x]  Upgrade activities as tolerated     [x]  Continue plan of care  []  Update interventions per flow sheet       []  Discharge due to:_  []  Other:_      Btey Marshall PT 12/10/2020  9:29 AM    Future Appointments   Date Time Provider Manoj Caruso   12/15/2020  9:30 AM Lerrain Thomas, PT [facility]   12/17/2020  9:30 AM Brian Crawford, PTA [facility]   12/22/2020  9:30 AM Efra Thomas, PT [facility]   12/24/2020  8:45 AM Brian Crawford, PTA [facility]   12/29/2020  8:45 AM Lerry Thomas, PT [facility]   12/31/2020 10:15 AM Lerry Thomas, PT [facility]

## 2020-12-14 DIAGNOSIS — G89.18 PAIN FOLLOWING SURGERY OR PROCEDURE: ICD-10-CM

## 2020-12-14 RX ORDER — OXYCODONE HYDROCHLORIDE 5 MG/1
5 TABLET ORAL
Qty: 21 TAB | Refills: 0 | Status: SHIPPED | OUTPATIENT
Start: 2020-12-14 | End: 2020-12-21 | Stop reason: SDUPTHER

## 2020-12-14 NOTE — TELEPHONE ENCOUNTER
[facility] Roxicodone as 12/9/20 for a 7 d/s. Last Visit: 11/11/20 with MONA Oh  Next Appointment: none  Previous Refill Encounter(s): 12/9/20 #21    Requested Prescriptions     Pending Prescriptions Disp Refills    oxyCODONE IR (Roxicodone) 5 mg immediate release tablet 21 Tab 0     Sig: Take 1 Tab by mouth every eight (8) hours as needed for Pain for up to 7 days. Max Daily Amount: 15 mg.

## 2020-12-15 ENCOUNTER — HOSPITAL ENCOUNTER (OUTPATIENT)
Dept: PHYSICAL THERAPY | Age: 55
Discharge: HOME | End: 2020-12-15
Attending: ORTHOPAEDIC SURGERY
Payer: COMMERCIAL

## 2020-12-15 PROCEDURE — 97140 MANUAL THERAPY 1/> REGIONS: CPT

## 2020-12-15 PROCEDURE — 97110 THERAPEUTIC EXERCISES: CPT

## 2020-12-15 NOTE — PROGRESS NOTES
PT DAILY TREATMENT NOTE     Patient Name: Marjorie Christiansen  Date:12/15/2020  : 1965  [x]  Patient  Verified  Payor: BLUE CROSS / Plan: [id number] PPO / Product Type: PPO /    In time:932  Out time:1023  Total Treatment Time (min): 51  Visit #: 7 of 12    Medicare/BCBS Only   Total Timed Codes (min):  41 1:1 Treatment Time:  41       Treatment Area: Right shoulder pain [M25.511]    SUBJECTIVE  Pain Level (0-10 scale): 3-4  Any medication changes, allergies to medications, adverse drug reactions, diagnosis change, or new procedure performed?: [x] No    [] Yes (see summary sheet for update)  Subjective functional status/changes:   [] No changes reported  Patient reports some increased pain in his shoulder yesterday 2020 with patient reporting having helped his daughter move. OBJECTIVE    Modality rationale: decrease inflammation and decrease pain to improve the patients ability to improve ease with sleep   Min Type Additional Details    10 [x]? Ice     []?  heat  []? Ice massage Position: Seated  Location: Right Shoulder, Post-tx       31 min Therapeutic Exercise:  [x]? See flow sheet : Emphasis placed on promotion of right shoulder PROM/AAROM and maintenance of elbow/wrist/hand PROM/AROM and strength   Rationale: increase ROM and increase strength to improve the patients ability to improve post-surgical recovery.     10 min Manual Therapy:    Supine, Right Shoulder Passive Physiological Grade II-III Mobilization - Flexion, Scaption, Abduction  Supine, Right GH Lateral Grade I-II Mobilization (OPP)  Supine, Right GH Inferior Grade I-II Mobilization (OPP)   The manual therapy interventions were performed at a separate and distinct time from the therapeutic activities interventions. Rationale: decrease pain, increase ROM and increase tissue extensibility to improve ease with dressing.                                                                     With   [] TE   [] TA   [] neuro   [] other: Patient Education: [x] Review HEP    [] Progressed/Changed HEP based on:   [] positioning   [] body mechanics   [] transfers   [] heat/ice application    [] other:      Other Objective/Functional Measures:   Right Shoulder IR (90 deg abduction) PROM = 10 deg     Pain Level (0-10 scale) post treatment: 0    ASSESSMENT/Changes in Function: With examination of shoulder without any signs demonstrating injury after patient reported utilization of the right UE while assisting his daughter in moving 12/15/2020. Secondary to subjective with review of post-surgical precautions with review of importance of compliance to reduce risk of post-surgical injury. With further progression of shoulder AAROM exercises with good tolerance and ability to maintain correct scapulohumeral rhythm. Patient will continue to benefit from skilled PT services to modify and progress therapeutic interventions, address functional mobility deficits, address ROM deficits, address strength deficits, analyze and address soft tissue restrictions, analyze and cue movement patterns, analyze and modify body mechanics/ergonomics, assess and modify postural abnormalities, address imbalance/dizziness and instruct in home and community integration to attain remaining goals. []  See Plan of Care  []  See progress note/recertification  []  See Discharge Summary         Progress towards goals / Updated goals:    Short Term Goals: To be accomplished in 3 weeks:  1. Patient will subjectively report full compliance with prescribed HEP. Eval: HEP provided  Current:Goal met: Pt reports performing HEP. 12/1/20  2. Patient will demonstrate right shoulder flexion and abduction PROM >/= 140 degrees to improve ease with dressing. Eval: Right Shoulder Flexion PROM 100 deg, Right Shoulder Abduction PROM 95 deg  Current: Progressing, Right Shoulder Flexion PROM 146 deg, Right Shoulder Abduction PROM 130 deg, 12/2/2020  3.  Patient will demonstrate right shoulder IR (90 deg abduction) PROM >/= 40 degrees to improve ease with self-care ADLs. Eval: Right Shoulder IR (45 deg abduction) = 30 deg  Current: Progressing, Right Shoulder IR (90 deg abduction) PROM = 10 deg, 12/15/2020     Long Term Goals: To be accomplished in 6 weeks:  1. Patient will demonstrate a significant functional improvement as demonstrated by a score of >/= 65 on FOTO. Eval: FOTO = 44       2. Patient will demonstrate right shoulder flexion and abduction AROM >/= 140 degrees to improve ease with overhead reach. Eval: NT per protocol  3. Patient will demonstrate right shoulder flexion and abduction MMT >/= 4+/5 to improve ease with functional lifting.   Eval: NT per protocol    PLAN  [x]  Upgrade activities as tolerated     [x]  Continue plan of care  []  Update interventions per flow sheet       []  Discharge due to:_  []  Other:_      Georgia Lombardo PT 12/15/2020  7:03 AM    Future Appointments   Date Time Provider Manoj Caruso   12/15/2020  9:30 AM [place] Me, PT [facility]   12/17/2020  9:30 AM Mimi Lopez PTA [facility]   12/22/2020  9:30 AM [place] Me, PT [facility]   12/24/2020  8:45 AM Mimi Lopez PTA [facility]   12/29/2020  8:45 AM Jb Me, PT [facility]   12/31/2020 10:15 AM Jb Me, PT [facility]

## 2020-12-17 ENCOUNTER — HOSPITAL ENCOUNTER (OUTPATIENT)
Dept: PHYSICAL THERAPY | Age: 55
Discharge: HOME | End: 2020-12-17
Attending: ORTHOPAEDIC SURGERY
Payer: COMMERCIAL

## 2020-12-17 PROCEDURE — 97110 THERAPEUTIC EXERCISES: CPT

## 2020-12-17 PROCEDURE — 97140 MANUAL THERAPY 1/> REGIONS: CPT

## 2020-12-17 NOTE — PROGRESS NOTES
PT DAILY TREATMENT NOTE     Patient Name: Sb Menjivar  Date:2020  : 1965  [x]  Patient  Verified  Payor: BLUE CROSS / Plan: [facility] PPO / Product Type: PPO /    In time:9:33  Out time:10:29  Total Treatment Time (min): 56  Visit #: 8 of 12    Medicare/BCBS Only   Total Timed Codes (min):  46 1:1 Treatment Time:  46       Treatment Area: Right shoulder pain [M25.511]    SUBJECTIVE  Pain Level (0-10 scale): 5  Any medication changes, allergies to medications, adverse drug reactions, diagnosis change, or new procedure performed?: [x] No    [] Yes (see summary sheet for update)  Subjective functional status/changes:   [] No changes reported  Pt reports no increase in pain after last session. OBJECTIVE              Modality rationale: decrease inflammation and decrease pain to improve the patients ability to improve ease with sleep   Min Type Additional Details     10 [x]? ???  Ice     []????  heat  []????  Ice massage Position: Seated  Location: Right Shoulder, Post-tx        36 min Therapeutic Exercise:  [x]? ??? See flow sheet : Emphasis placed on promotion of right shoulder PROM and maintenance of elbow/wrist/hand PROM/AROM and strength   Rationale: increase ROM and increase strength to improve the patients ability to improve post-surgical recovery.     10 min Manual Therapy:    Supine, Right Shoulder PROM - Flexion, Scaption, Abduction, ER/IR  Supine, right PROM of elbow    The manual therapy interventions were performed at a separate and distinct time from the therapeutic activities interventions. Rationale: decrease pain, increase ROM and increase tissue extensibility to improve ease with dressing.         With   [] TE   [] TA   [] neuro   [] other: Patient Education: [x] Review HEP    [] Progressed/Changed HEP based on:   [] positioning   [] body mechanics   [] transfers   [] heat/ice application    [] other:      Other Objective/Functional Measures: supine AROM shoulder flexion 90 deg no pain. Pain Level (0-10 scale) post treatment: 0    ASSESSMENT/Changes in Function: Initiation of light AROM of rights shoulder without increase in pain with performing exercises. Patient will continue to benefit from skilled PT services to modify and progress therapeutic interventions, address functional mobility deficits, address ROM deficits, address strength deficits and analyze and address soft tissue restrictions to attain remaining goals. []  See Plan of Care  []  See progress note/recertification  []  See Discharge Summary         Progress towards goals / Updated goals:     Short Term Goals: To be accomplished in 3 weeks:  1. Patient will subjectively report full compliance with prescribed HEP. Eval: HEP provided  Current:Goal met: Pt reports performing HEP. 12/1/20  2. Patient will demonstrate right shoulder flexion and abduction PROM >/= 140 degrees to improve ease with dressing. Eval: Right Shoulder Flexion PROM 100 deg, Right Shoulder Abduction PROM 95 deg  Current: Progressing, Right Shoulder Flexion PROM 146 deg, Right Shoulder Abduction PROM 130 deg, 12/2/2020  3. Patient will demonstrate right shoulder IR (90 deg abduction) PROM >/= 40 degrees to improve ease with self-care ADLs. Eval: Right Shoulder IR (45 deg abduction) = 30 deg  Current: Progressing, Right Shoulder IR (90 deg abduction) PROM = 10 deg, 12/15/2020     Long Term Goals: To be accomplished in 6 weeks:  1. Patient will demonstrate a significant functional improvement as demonstrated by a score of >/= 65 on FOTO. Eval: FOTO = 44       2. Patient will demonstrate right shoulder flexion and abduction AROM >/= 140 degrees to improve ease with overhead reach. Eval: NT per protocol  3. Patient will demonstrate right shoulder flexion and abduction MMT >/= 4+/5 to improve ease with functional lifting.   Eval: NT per protocol       PLAN  []  Upgrade activities as tolerated     [x]  Continue plan of care  []  Update interventions per flow sheet       []  Discharge due to:_  []  Other:_      Juan Arevalo, ARYSHAWN 12/17/2020  10:01 AM    Future Appointments   Date Time Provider Manoj Caruso   12/22/2020  9:30 AM Halima Pearce, PT [facility]   12/24/2020  8:45 AM Daria Freeman PTA [facility]   12/29/2020  8:45 AM Halima Pearce, PT [facility]   12/31/2020 10:15 AM Halima Pearce, PT [facility]

## 2020-12-21 DIAGNOSIS — G89.18 PAIN FOLLOWING SURGERY OR PROCEDURE: ICD-10-CM

## 2020-12-21 RX ORDER — OXYCODONE HYDROCHLORIDE 5 MG/1
5 TABLET ORAL
Qty: 21 TAB | Refills: 0 | Status: SHIPPED | OUTPATIENT
Start: 2020-12-21 | End: 2020-12-28

## 2020-12-21 NOTE — TELEPHONE ENCOUNTER
Last Visit: 11/11/20 with MONA Oh  Next Appointment: Archana Mix to follow-up in 3 weeks  Previous Refill Encounter(s): 12/14/20 #21    Requested Prescriptions     Pending Prescriptions Disp Refills    oxyCODONE IR (Roxicodone) 5 mg immediate release tablet 21 Tab 0     Sig: Take 1 Tab by mouth every eight (8) hours as needed for Pain for up to 7 days. Max Daily Amount: 15 mg.

## 2020-12-22 ENCOUNTER — HOSPITAL ENCOUNTER (OUTPATIENT)
Dept: PHYSICAL THERAPY | Age: 55
Discharge: HOME | End: 2020-12-22
Attending: ORTHOPAEDIC SURGERY
Payer: COMMERCIAL

## 2020-12-22 PROCEDURE — 97110 THERAPEUTIC EXERCISES: CPT

## 2020-12-22 PROCEDURE — 97140 MANUAL THERAPY 1/> REGIONS: CPT

## 2020-12-22 NOTE — PROGRESS NOTES
PT DAILY TREATMENT NOTE     Patient Name: Maureen Spencer  Date:2020  : 1965  [x]  Patient  Verified  Payor: BLUE CROSS / Plan: [id number] PPO / Product Type: PPO /    In time:935  Out time:1030  Total Treatment Time (min): 55  Visit #: 9 of 12    Medicare/BCBS Only   Total Timed Codes (min):  45 1:1 Treatment Time:  40       Treatment Area: Right shoulder pain [M25.511]    SUBJECTIVE  Pain Level (0-10 scale): 2  Any medication changes, allergies to medications, adverse drug reactions, diagnosis change, or new procedure performed?: [x] No    [] Yes (see summary sheet for update)  Subjective functional status/changes:   [] No changes reported  Patient reports on Saturday attempting to clean his dog's crate when we performed a quick jerking movement with the right arm with sensation of a \"pop\" without pain. Patient denies pain since incident but reports reduced ability to lift arm. OBJECTIVE    Modality rationale: decrease inflammation and decrease pain to improve the patients ability to improve ease with sleep   Min Type Additional Details     10 [x]? ?  Ice     []? ?  heat  []? ?  Ice massage Position: Seated  Location: Right Shoulder, Post-tx        30 min Therapeutic Exercise:  [x]? ? See flow sheet : Emphasis placed on promotion of right shoulder PROM/AAROM and maintenance of elbow/wrist/hand PROM/AROM and strength   Rationale: increase ROM and increase strength to improve the patients ability to improve post-surgical recovery.    15 min Manual Therapy:    Supine, Right Shoulder Passive Physiological Grade II-III Mobilization - Flexion, Scaption, Abduction  Supine, Right GH Lateral Grade I-II Mobilization (OPP)  Supine, Right GH Inferior Grade I-III Mobilization (OPP)  Supine, Right GH AP Grade I-II Mobilization (OPP)   The manual therapy interventions were performed at a separate and distinct time from the therapeutic activities interventions.   Rationale: decrease pain, increase ROM and increase tissue extensibility to improve ease with dressing. With   [] TE   [] TA   [] neuro   [] other: Patient Education: [x] Review HEP    [] Progressed/Changed HEP based on:   [] positioning   [] body mechanics   [] transfers   [] heat/ice application    [] other:      Other Objective/Functional Measures: See goals below. *With modifications of exercises as appropriate secondary to re-injury 12/19/2020. To continue to monitor progress post-surgically with MD awareness if patient presents with relative plateau and/or difficulties with progression with concern regarding re-tear low but unable to be ruled out secondary to reinjury. Patient educated to perform gentle stretching over the next couple of days with further evaluation next treatment session. Pain Level (0-10 scale) post treatment: 2    ASSESSMENT/Changes in Function: Patient with appropriate post-surgical progression as demonstrated through objective goal assessment with progression in accordance with MD discretion, post-surgical protocol and patient tolerance. Patient with slight set back after subjective performance of a quick jerking movement with his right arm while at home 12/19/2020 with clinician monitoring but at this time without regression demonstrated objectively therefore with hope that patient without injury. To continue to monitor with physician awareness if symptoms persist and/or patient with a loss of AROM and strength without ability to progress. To continue to progress in accordance to patient tolerance and MD discretion to optimize patient return to PLOF.      Patient will continue to benefit from skilled PT services to modify and progress therapeutic interventions, address functional mobility deficits, address ROM deficits, address strength deficits, analyze and address soft tissue restrictions, analyze and cue movement patterns, analyze and modify body mechanics/ergonomics and assess and modify postural abnormalities to attain remaining goals. []  See Plan of Care  [x]  See progress note/recertification  []  See Discharge Summary         Progress towards goals / Updated goals:    Short Term Goals: To be accomplished in 3 weeks:  1. Patient will subjectively report full compliance with prescribed HEP. Eval: HEP provided  Current: Met, Pt reports performing HEP. 12/1/20  2. Patient will demonstrate right shoulder flexion and abduction PROM >/= 140 degrees to improve ease with dressing. Eval: Right Shoulder Flexion PROM 100 deg, Right Shoulder Abduction PROM 95 deg  Current: Progressing, Right Shoulder Flexion PROM 146 deg, Right Shoulder Abduction PROM 130 deg, 12/2/2020  3. Patient will demonstrate right shoulder IR (90 deg abduction) PROM >/= 40 degrees to improve ease with self-care ADLs. Eval: Right Shoulder IR (45 deg abduction) = 30 deg  Current: Progressing, Right Shoulder IR (90 deg abduction) PROM = 10 deg, 12/15/2020     Long Term Goals: To be accomplished in 6 weeks:  1. Patient will demonstrate a significant functional improvement as demonstrated by a score of >/= 65 on FOTO. Eval: FOTO = 44       Current: Progressing, FOTO = 55, 12/22/2020  2. Patient will demonstrate right shoulder flexion and abduction AROM >/= 140 degrees to improve ease with overhead reach. Eval: NT per protocol  Current: Progressing, Flexion AROM 98 deg, Abduction AROM, Abduction AROM 82 deg, 12/22/2020  3. Patient will demonstrate right shoulder flexion and abduction MMT >/= 4+/5 to improve ease with functional lifting.   Eval: NT per protocol  Current: Progressing, Flexion MMT 2+/5, Abduction MMT 2+/5, 12/22/2020    PLAN  [x]  Upgrade activities as tolerated     [x]  Continue plan of care  []  Update interventions per flow sheet       []  Discharge due to:_  []  Other:_      Emerita Garcia, PT 12/22/2020  7:03 AM    Future Appointments   Date Time Provider Manoj Caruso   12/22/2020  9:30 AM Shena Coffman Last [facility], [address] [facility]   12/24/2020  8:45 AM Mac Pollen, PTA [facility]   12/29/2020  8:45 AM Santos Goodwin, PT [facility]   12/31/2020 10:15 AM Santos Goodwin, PT [facility]

## 2020-12-22 NOTE — PROGRESS NOTES
[facility]              [address]        [place], [address] [place]   [phone] fax    Progress Note  Patient name: Anahy Maharaj Start of Care: 2020   Referral source: Vandana Byrd [place] : 1965   Medical/Treatment Diagnosis: Right shoulder pain [M25.511]  Payor: [facility] / Plan: [facility] PPO / Product Type: PPO /  Onset Date:  DoS 11/3/2020     Prior Hospitalization: see medical history Provider#: [id number]   Medications: Verified on Patient Summary List     Comorbidities: Arthritis, HTN, Right Elbow Surgery Gerardine Betsy ), Lumbar Surgery x2, Right Knee Menisectomy (DoS ), HTN   Prior Level of Function: RHD, (I) Functional ADLs, Disabled (does not work), (I) Self-Care ADLs, (I) Driving  Visits from [facility] Care: 9    Missed Visits: 0    Established Goals:     Short Term Goals: To be accomplished in 3 weeks:  1. Patient will subjectively report full compliance with prescribed HEP. Eval: HEP provided  Current: Met, Pt reports performing HEP  2. Patient will demonstrate right shoulder flexion and abduction PROM >/= 140 degrees to improve ease with dressing. Eval: Right Shoulder Flexion PROM 100 deg, Right Shoulder Abduction PROM 95 deg  Current: Progressing, Right Shoulder Flexion PROM 146 deg, Right Shoulder Abduction PROM 130 deg  3. Patient will demonstrate right shoulder IR (90 deg abduction) PROM >/= 40 degrees to improve ease with self-care ADLs. Eval: Right Shoulder IR (45 deg abduction) = 30 deg  Current: Progressing, Right Shoulder IR (90 deg abduction) PROM = 10 deg     Long Term Goals: To be accomplished in 6 weeks:  1. Patient will demonstrate a significant functional improvement as demonstrated by a score of >/= 65 on FOTO. Eval: FOTO = 44       Current: Progressing, FOTO = 55  2.  Patient will demonstrate right shoulder flexion and abduction AROM >/= 140 degrees to improve ease with overhead reach.  Eval: NT per protocol  Current: Progressing, Flexion AROM 98 deg, Abduction AROM, Abduction AROM 82 deg  3. Patient will demonstrate right shoulder flexion and abduction MMT >/= 4+/5 to improve ease with functional lifting. Eval: NT per protocol  Current: Progressing, Flexion MMT 2+/5, Abduction MMT 2+/5    Key Functional Changes: See goals above. Updated Goals: Continue with unmet goals above. ASSESSMENT/RECOMMENDATIONS:    Patient with appropriate post-surgical progression as demonstrated through objective goal assessment with progression in accordance with MD discretion, post-surgical protocol and patient tolerance. Patient with slight set back after subjective performance of a quick jerking movement with his right arm while at home 12/19/2020 with clinician monitoring but at this time without regression demonstrated objectively therefore with hope that patient without injury. To continue to monitor with physician awareness if symptoms persist and/or patient with a loss of AROM and strength without ability to progress. To continue to progress in accordance to patient tolerance and MD discretion to optimize patient return to PLOF.      Patient will continue to benefit from skilled PT services to modify and progress therapeutic interventions, address functional mobility deficits, address ROM deficits, address strength deficits, analyze and address soft tissue restrictions, analyze and cue movement patterns, analyze and modify body mechanics/ergonomics and assess and modify postural abnormalities to attain remaining goals.     [x]Continue therapy per initial plan/protocol at a frequency of  2 x per week for 4 weeks  []Continue therapy with the following recommended changes:_____________________      _____________________________________________________________________  []Discontinue therapy progressing towards or have reached established goals  []Discontinue therapy due to lack of appreciable progress towards goals  []Discontinue therapy due to lack of attendance or compliance  []Await Physician's recommendations/decisions regarding therapy  []Other:________________________________________________________________    Thank you for this referral.    Grisel Clark, PT 12/22/2020 7:04 AM  NOTE TO PHYSICIAN:  PLEASE COMPLETE THE ORDERS BELOW AND   FAX TO [facility]: [phone]  If you are unable to process this request in 24 hours please contact our office: [phone]    []  I have read the above report and request that my patient continue as recommended. []  I have read the above report and request that my patient continue therapy with the following changes/special instructions:________________________________________  []I have read the above report and request that my patient be discharged from therapy.     [address] [facility] Signature:____________Date:_________TIME:________    [employer], Date and Time must be completed for valid certification **

## 2020-12-24 ENCOUNTER — HOSPITAL ENCOUNTER (OUTPATIENT)
Dept: PHYSICAL THERAPY | Age: 55
Discharge: HOME | End: 2020-12-24
Attending: ORTHOPAEDIC SURGERY
Payer: COMMERCIAL

## 2020-12-24 PROCEDURE — 97140 MANUAL THERAPY 1/> REGIONS: CPT

## 2020-12-24 PROCEDURE — 97110 THERAPEUTIC EXERCISES: CPT

## 2020-12-24 NOTE — PROGRESS NOTES
PT DAILY TREATMENT NOTE     Patient Name: Roselia Marin  Date:2020  : 1965  [x]  Patient  Verified  Payor: BLUE CROSS / Plan: [facility] PPO / Product Type: PPO /    In time:8:52  Out time:9:45  Total Treatment Time (min): 48  Visit #: 1 of 8    Medicare/BCBS Only   Total Timed Codes (min):  43 1:1 Treatment Time:  43       Treatment Area: Right shoulder pain [M25.511]    SUBJECTIVE  Pain Level (0-10 scale): 3  Any medication changes, allergies to medications, adverse drug reactions, diagnosis change, or new procedure performed?: [x] No    [] Yes (see summary sheet for update)  Subjective functional status/changes:   [] No changes reported  Pt reports he is feeling better than last session, but continues to feel that his shoulder is moving stiff. OBJECTIVE              Modality rationale: decrease inflammation and decrease pain to improve the patients ability to improve ease with sleep   Min Type Additional Details     10 [x]?????  Ice     []?????  heat  []?????  Ice massage Position: Seated  Location: Right Shoulder, Post-tx        33 min Therapeutic Exercise:  [x]? ???? See flow sheet : Emphasis placed on promotion of right shoulder PROM and maintenance of elbow/wrist/hand PROM/AROM and strength   Rationale: increase ROM and increase strength to improve the patients ability to improve post-surgical recovery.     10 min Manual Therapy:    Supine, Right Shoulder PROM - Flexion, Scaption, Abduction, ER/IR  Supine, right PROM of elbow    The manual therapy interventions were performed at a separate and distinct time from the therapeutic activities interventions. Rationale: decrease pain, increase ROM and increase tissue extensibility to improve ease with dressing.         With   [] TE   [] TA   [] neuro   [] other: Patient Education: [x] Review HEP    [] Progressed/Changed HEP based on:   [] positioning   [] body mechanics   [] transfers   [] heat/ice application    [] other: Other Objective/Functional Measures: AROM right shoulder flexion 109 deg, abduction 85 deg. Pain Level (0-10 scale) post treatment:0    ASSESSMENT/Changes in Function: Pt was able to tolerate exercises with less pain this session and with does not seem as stiff and aggravated when compared to last visit. Patient will continue to benefit from skilled PT services to modify and progress therapeutic interventions, address functional mobility deficits, address ROM deficits, address strength deficits and analyze and address soft tissue restrictions to attain remaining goals. []  See Plan of Care  []  See progress note/recertification  []  See Discharge Summary         Progress towards goals / Updated goals:     Short Term Goals: To be accomplished in 3 weeks:  1. Patient will subjectively report full compliance with prescribed HEP. PN: Met, Pt reports performing HEP. 12/1/20  2. Patient will demonstrate right shoulder flexion and abduction PROM >/= 140 degrees to improve ease with dressing. PN:  Right Shoulder Flexion PROM 146 deg, Right Shoulder Abduction PROM 130 deg, 12/2/2020  3. Patient will demonstrate right shoulder IR (90 deg abduction) PROM >/= 40 degrees to improve ease with self-care ADLs. PN:  Right Shoulder IR (90 deg abduction) PROM = 10 deg, 12/15/2020     Long Term Goals: To be accomplished in 6 weeks:  1. Patient will demonstrate a significant functional improvement as demonstrated by a score of >/= 65 on FOTO. PN:  FOTO = 55, 12/22/2020  2. Patient will demonstrate right shoulder flexion and abduction AROM >/= 140 degrees to improve ease with overhead reach. PN: Flexion AROM 98 deg, Abduction AROM, Abduction AROM 82 deg, 12/22/2020  3. Patient will demonstrate right shoulder flexion and abduction MMT >/= 4+/5 to improve ease with functional lifting.   PN: , Flexion MMT 2+/5, Abduction MMT 2+/5, 12/22/2020       PLAN  []  Upgrade activities as tolerated     [x]  Continue plan of care  []  Update interventions per flow sheet       []  Discharge due to:_  []  Other:_      Sherin Dozier, RAYSHAWN 12/24/2020  8:55 AM    Future Appointments   Date Time Provider Manoj Caruso   12/29/2020  8:45 AM Gold Marcus0 N Romie Severino [facility]   12/30/2020 10:45 AM MONA Mcdonald [facility]   12/31/2020 10:15 AM Lata Armenta PT [facility]

## 2020-12-29 ENCOUNTER — HOSPITAL ENCOUNTER (OUTPATIENT)
Dept: PHYSICAL THERAPY | Age: 55
Discharge: HOME | End: 2020-12-29
Attending: ORTHOPAEDIC SURGERY
Payer: COMMERCIAL

## 2020-12-29 PROCEDURE — 97110 THERAPEUTIC EXERCISES: CPT

## 2020-12-29 PROCEDURE — 97140 MANUAL THERAPY 1/> REGIONS: CPT

## 2020-12-29 PROCEDURE — 97112 NEUROMUSCULAR REEDUCATION: CPT

## 2020-12-29 NOTE — PROGRESS NOTES
Gwen Hoffman  1965     HISTORY OF PRESENT ILLNESS  Gwen Hoffman is a 63 y.o. male who presents today for evaluation s/p Right shoulder arthroscopic rotator cuff repair, biceps tenodesis and subacromial decompression on 11/3/20. Patient has been going to PT. Has been taking diclofenac for pain. He has been compliant with his exercises and restrictions. He has gone to PT and doing well. He did have an incident a couple weeks ago that caused some shoulder pain. He had to  a heavy dog crate which caused pain but he went to PT and the pain has improved since then. He does not have pain today. He is also having some left knee pain. He had xrays done at [facility] which he has today. He take diclofenac but that doesn't help the knee pain. He wears a knee brace which does help. His pain is achy and located on the lateral aspect of his knee. He also has some swelling and burning in that area. He would like a cortisone injection to help today. Patient denies any fever, chills, chest pain, shortness of breath or calf pain. There are no new illness or injuries to report since last seen in the office. PHYSICAL EXAM:   Visit Vitals  Temp 97.2 °F (36.2 °C) (Temporal)      The patient is a well-developed, well-nourished male in no acute distress. The patient is alert and oriented times three. The patient appears to be well groomed. Mood and affect are normal.  ORTHOPEDIC EXAM of right shoulder:  Inspection: swelling not present,  Bruising not present  Incision well healed  Passive glenohumeral abduction 0-90 degrees, 130 PFF, 90 AFF, 30 PER  Strength: n/a  2+ distal pulses    PROCEDURE: After sterile prep, 3 cc of Xylocaine and 1 cc of Celestone were injected into the left knee.        [facility] - [facility] Niel[facility]  OFFICE PROCEDURE PROGRESS NOTE        Chart reviewed for the following:  Samantha GAITAN PA, have reviewed the History, Physical and updated the Allergic reactions for Shaileshchela 64 performed immediately prior to start of procedure:  I, Ning Zavala PA-C, have performed the following reviews on [place] prior to the start of the procedure:            * Patient was identified by name and date of birth   * Agreement on procedure being performed was verified  * Risks and Benefits explained to the patient  * Procedure site verified and marked as necessary  * Patient was positioned for comfort  * Consent was signed and verified     Time: 11:31 AM    Date of procedure: 12/30/2020    Procedure performed by:  MONA Zhong    Provider assisted by: (see medication administration)    How tolerated by patient: tolerated the procedure well with no complications    Comments: none      IMPRESSION:  s/p Right shoulder arthroscopic rotator cuff repair, biceps tenodesis and subacromial decompression, Mild left knee OA    PLAN:   Pt doing well post operatively  His motion has improved since last visit. I think his left knee pain is arthritic in nature. He will continue wearing the brace and taking diclofenac to help with discomfort. Pt given a left knee cortisone injection to help with pain. He tolerated it well. Will continue PT and exercises. Another order was placed today. Stressed to patient that nothing causes an increase in pain.   RTC 4 weeks    Patient seen and evaluated by Dr. Jayna Hsu today who agrees with treatment plan      Goldie [facility]ong [facility]

## 2020-12-29 NOTE — PROGRESS NOTES
PT DAILY TREATMENT NOTE     Patient Name: Foreign Blanco  Date:2020  : 1965  [x]  Patient  Verified  Payor: BLUE CROSS / Plan: [facility] PPO / Product Type: PPO /    In time:849  Out time:938  Total Treatment Time (min): 49  Visit #: 2 of 8    Medicare/BCBS Only   Total Timed Codes (min):  39 1:1 Treatment Time:  39       Treatment Area: Right shoulder pain [M25.511]    SUBJECTIVE  Pain Level (0-10 scale): 3  Any medication changes, allergies to medications, adverse drug reactions, diagnosis change, or new procedure performed?: [x] No    [] Yes (see summary sheet for update)  Subjective functional status/changes:   [] No changes reported  Patient reports no change since last treatment session. OBJECTIVE    Modality rationale: decrease inflammation and decrease pain to improve the patients ability to improve ease with sleep   Min Type Additional Details     10 [x]? ??  Ice     []? ??  heat  []? ??  Ice massage Position: Seated  Location: Right Shoulder, Post-tx        19 min Therapeutic Exercise:  [x]? ?? See flow sheet : Emphasis placed on promotion of right shoulder PROM/AAROM and maintenance of elbow/wrist/hand PROM/AROM and strength   Rationale: increase ROM and increase strength to improve the patients ability to improve post-surgical recovery. 10 min Neuromuscular Education:  [x]? ?? See flow sheet : Emphasis placed on improving activation and recruitment of the scapulothoracic and glenohumeral musculature and improving scapulohumeral rhythm with UE elevation   Rationale: increase ROM and increase strength to improve the patients ability to improve post-surgical recovery.      10 min Manual Therapy:    Supine, Right Shoulder Passive Physiological Grade II-III Mobilization - Flexion, Scaption, Abduction  Supine, Right GH Lateral Grade I-II Mobilization (OPP)  Supine, Right GH Inferior Grade I-III Mobilization (OPP)  Supine, Right GH AP Grade I-II Mobilization (OPP) The manual therapy interventions were performed at a separate and distinct time from the therapeutic activities interventions. Rationale: decrease pain, increase ROM and increase tissue extensibility to improve ease with dressing. With   [] TE   [] TA   [] neuro   [] other: Patient Education: [x] Review HEP    [] Progressed/Changed HEP based on:   [] positioning   [] body mechanics   [] transfers   [] heat/ice application    [] other:      Other Objective/Functional Measures:   Right Shoulder IR (90 deg abduction) PROM = 30 deg     Pain Level (0-10 scale) post treatment: 0    ASSESSMENT/Changes in Function: With continued gradual objective improvement in available right shoulder AROM/PROM with ROM loss most significant in the directions of abduction and internal rotation with abnormal firm end-feel noted at end-range. Patient will continue to benefit from skilled PT services to modify and progress therapeutic interventions, address functional mobility deficits, address ROM deficits, address strength deficits, analyze and address soft tissue restrictions, analyze and cue movement patterns, analyze and modify body mechanics/ergonomics and assess and modify postural abnormalities to attain remaining goals. []  See Plan of Care  []  See progress note/recertification  []  See Discharge Summary         Progress towards goals / Updated goals:    Short Term Goals: To be accomplished in 3 weeks:  1. Patient will subjectively report full compliance with prescribed HEP. PN: Met, Pt reports performing HEP. 12/1/20  2. Patient will demonstrate right shoulder flexion and abduction PROM >/= 140 degrees to improve ease with dressing. PN:  Right Shoulder Flexion PROM 146 deg, Right Shoulder Abduction PROM 130 deg, 12/2/2020  3. Patient will demonstrate right shoulder IR (90 deg abduction) PROM >/= 40 degrees to improve ease with self-care ADLs.   PN:  Right Shoulder IR (90 deg abduction) PROM = 10 deg, 12/15/2020  Current: Progressing, Right Shoulder IR (90 deg abduction) PROM = 30 deg, 12/29/2020     Long Term Goals: To be accomplished in 6 weeks:  1. Patient will demonstrate a significant functional improvement as demonstrated by a score of >/= 65 on FOTO. PN:  FOTO = 55, 12/22/2020  2. Patient will demonstrate right shoulder flexion and abduction AROM >/= 140 degrees to improve ease with overhead reach. PN: Flexion AROM 98 deg, Abduction AROM, Abduction AROM 82 deg, 12/22/2020  Current: Progressing, Flexion AROM 112 deg, Abduction AROM, Abduction AROM 86 deg, 12/29/2020  3. Patient will demonstrate right shoulder flexion and abduction MMT >/= 4+/5 to improve ease with functional lifting.   PN: , Flexion MMT 2+/5, Abduction MMT 2+/5, 12/22/2020    PLAN  [x]  Upgrade activities as tolerated     [x]  Continue plan of care  []  Update interventions per flow sheet       []  Discharge due to:_  []  Other:_      Maninder Healy PT 12/29/2020  7:03 AM    Future Appointments   Date Time Provider Manoj Caruso   12/29/2020  8:45 AM Teddy Halim, PT [facility]   12/30/2020 10:45 AM MONA Vásquez BS AMB   12/31/2020 10:15 AM Teddy Camim, PT [facility]   1/5/2021 11:00 AM Sherryprincess Diego, PTA [facility]   1/7/2021 10:15 AM Saritha Villela, PT [facility]   1/12/2021 11:00 AM Sherry Dock, PTA [facility]   1/14/2021 10:15 AM Teddy Halim, PT [facility]   1/19/2021 10:15 AM Teddy Halim, PT [facility]   1/21/2021 10:15 AM Sherryprincess Diego, PTA [facility]

## 2020-12-30 ENCOUNTER — OFFICE VISIT (OUTPATIENT)
Dept: ORTHOPEDIC SURGERY | Age: 55
End: 2020-12-30
Payer: COMMERCIAL

## 2020-12-30 VITALS — TEMPERATURE: 97.2 F

## 2020-12-30 DIAGNOSIS — S46.101A INJURY OF TENDON OF LONG HEAD OF RIGHT BICEPS, INITIAL ENCOUNTER: ICD-10-CM

## 2020-12-30 DIAGNOSIS — M75.111 INCOMPLETE ROTATOR CUFF TEAR OR RUPTURE OF RIGHT SHOULDER, NOT SPECIFIED AS TRAUMATIC: Primary | ICD-10-CM

## 2020-12-30 DIAGNOSIS — M17.12 PRIMARY OSTEOARTHRITIS OF LEFT KNEE: ICD-10-CM

## 2020-12-30 DIAGNOSIS — Z98.890 STATUS POST ARTHROSCOPY OF RIGHT SHOULDER: ICD-10-CM

## 2020-12-30 PROCEDURE — 20610 DRAIN/INJ JOINT/BURSA W/O US: CPT | Performed by: ORTHOPAEDIC SURGERY

## 2020-12-30 PROCEDURE — 99213 OFFICE O/P EST LOW 20 MIN: CPT | Performed by: ORTHOPAEDIC SURGERY

## 2020-12-30 RX ORDER — BETAMETHASONE SODIUM PHOSPHATE AND BETAMETHASONE ACETATE 3; 3 MG/ML; MG/ML
6 INJECTION, SUSPENSION INTRA-ARTICULAR; INTRALESIONAL; INTRAMUSCULAR; SOFT TISSUE ONCE
Status: COMPLETED | OUTPATIENT
Start: 2020-12-30 | End: 2020-12-30

## 2020-12-30 RX ADMIN — BETAMETHASONE SODIUM PHOSPHATE AND BETAMETHASONE ACETATE 6 MG: 3; 3 INJECTION, SUSPENSION INTRA-ARTICULAR; INTRALESIONAL; INTRAMUSCULAR; SOFT TISSUE at 11:34

## 2020-12-31 ENCOUNTER — APPOINTMENT (OUTPATIENT)
Dept: PHYSICAL THERAPY | Age: 55
End: 2020-12-31
Attending: ORTHOPAEDIC SURGERY
Payer: COMMERCIAL

## 2021-01-05 ENCOUNTER — HOSPITAL ENCOUNTER (OUTPATIENT)
Dept: PHYSICAL THERAPY | Age: 56
Discharge: HOME | End: 2021-01-05
Attending: ORTHOPAEDIC SURGERY
Payer: COMMERCIAL

## 2021-01-05 PROCEDURE — 97140 MANUAL THERAPY 1/> REGIONS: CPT

## 2021-01-05 PROCEDURE — 97110 THERAPEUTIC EXERCISES: CPT

## 2021-01-05 PROCEDURE — 97112 NEUROMUSCULAR REEDUCATION: CPT

## 2021-01-05 NOTE — PROGRESS NOTES
PT DAILY TREATMENT NOTE     Patient Name: Sd Pena  Date:2021  : 1965  [x]  Patient  Verified  Payor: BLUE CROSS / Plan: [facility] PPO / Product Type: PPO /    In time:11:00  Out time:11:54  Total Treatment Time (min): 54  Visit #: 3 of 8    Medicare/BCBS Only   Total Timed Codes (min):  44 1:1 Treatment Time:  44       Treatment Area: Right shoulder pain [M25.511]    SUBJECTIVE  Pain Level (0-10 scale): 0  Any medication changes, allergies to medications, adverse drug reactions, diagnosis change, or new procedure performed?: [x] No    [] Yes (see summary sheet for update)  Subjective functional status/changes:   [] No changes reported  Pt reports he is continuing to have difficulty reaching overhead, but his doctor was pleased with his progress when he follow up with them last week. OBJECTIVE              Modality rationale: decrease inflammation and decrease pain to improve the patients ability to improve ease with sleep   Min Type Additional Details     10 [x]??????  Ice     []??????  heat  []??????  Ice massage Position: Seated  Location: Right Shoulder, Post-tx        24 min Therapeutic Exercise:  [x]?????? See flow sheet : Emphasis placed on promotion of right shoulder PROM and maintenance of elbow/wrist/hand PROM/AROM and strength   Rationale: increase ROM and increase strength to improve the patients ability to improve post-surgical recovery.        10 min Neuromuscular Education:  [x]? ??? See flow sheet : Emphasis placed on improving activation and recruitment of the scapulothoracic and glenohumeral musculature and improving scapulohumeral rhythm with UE elevation   Rationale: increase ROM and increase strength to improve the patients ability to improve post-surgical recovery.     10 min Manual Therapy:    Supine, Right Shoulder PROM - Flexion, Scaption, Abduction, ER/IR  Supine, right PROM of elbow    The manual therapy interventions were performed at a separate and distinct time from the therapeutic activities interventions. Rationale: decrease pain, increase ROM and increase tissue extensibility to improve ease with dressing. With   [] TE   [] TA   [] neuro   [] other: Patient Education: [x] Review HEP    [] Progressed/Changed HEP based on:   [] positioning   [] body mechanics   [] transfers   [] heat/ice application    [] other:      Other Objective/Functional Measures: right shoulder flexion PROM 140 deg, abduction PROM 140 deg. Pain Level (0-10 scale) post treatment: 0    ASSESSMENT/Changes in Function: Pt has progressed well with PROM of right shoulder PROM with flexion and abduction. Emphasis on increasing scapular stability muscle to increase ease with shoulder elevation. Patient will continue to benefit from skilled PT services to modify and progress therapeutic interventions, address functional mobility deficits, address ROM deficits, address strength deficits and analyze and address soft tissue restrictions to attain remaining goals. []  See Plan of Care  []  See progress note/recertification  []  See Discharge Summary         Progress towards goals / Updated goals:  Short Term Goals: To be accomplished in 3 weeks:  1. Patient will subjectively report full compliance with prescribed HEP. PN: Met, Pt reports performing HEP. 12/1/20  2. Patient will demonstrate right shoulder flexion and abduction PROM >/= 140 degrees to improve ease with dressing. PN:  Right Shoulder Flexion PROM 146 deg, Right Shoulder Abduction PROM 130 deg, 12/2/2020  Current: Goal met: right shoulder flexion PROM 140 deg, abduction PROM 140 deg. 1/5/20  3. Patient will demonstrate right shoulder IR (90 deg abduction) PROM >/= 40 degrees to improve ease with self-care ADLs.   PN:  Right Shoulder IR (90 deg abduction) PROM = 10 deg, 12/15/2020  Current: Progressing, Right Shoulder IR (90 deg abduction) PROM = 30 deg, 12/29/2020     Long Term Goals: To be accomplished in 6 weeks:  1. Patient will demonstrate a significant functional improvement as demonstrated by a score of >/= 65 on FOTO. PN:  FOTO = 55, 12/22/2020  2. Patient will demonstrate right shoulder flexion and abduction AROM >/= 140 degrees to improve ease with overhead reach. PN: Flexion AROM 98 deg, Abduction AROM, Abduction AROM 82 deg, 12/22/2020  Current: Progressing, Flexion AROM 112 deg, Abduction AROM, Abduction AROM 86 deg, 12/29/2020  3. Patient will demonstrate right shoulder flexion and abduction MMT >/= 4+/5 to improve ease with functional lifting.   PN: , Flexion MMT 2+/5, Abduction MMT 2+/5, 12/22/2020    PLAN  []  Upgrade activities as tolerated     [x]  Continue plan of care  []  Update interventions per flow sheet       []  Discharge due to:_  []  Other:_      Christa Connolly PTA 1/5/2021  11:23 AM    Future Appointments   Date Time Provider Manoj Caruso   1/7/2021 10:15 AM Saritha Villela PT [facility]   1/12/2021 11:00 AM Sherry Diego PTA [facility]   1/14/2021 10:15 AM Ruth Zelaya, PT [facility]   1/19/2021 10:15 AM Ruth Zelaya PT [facility]   1/21/2021 10:15 AM Sherry Diego PTA [facility]   1/27/2021 10:15 AM MNOA Vásquez BS AMB

## 2021-01-07 ENCOUNTER — HOSPITAL ENCOUNTER (OUTPATIENT)
Dept: PHYSICAL THERAPY | Age: 56
Discharge: HOME | End: 2021-01-07
Attending: ORTHOPAEDIC SURGERY
Payer: COMMERCIAL

## 2021-01-07 PROCEDURE — 97140 MANUAL THERAPY 1/> REGIONS: CPT

## 2021-01-07 PROCEDURE — 97110 THERAPEUTIC EXERCISES: CPT

## 2021-01-07 PROCEDURE — 97112 NEUROMUSCULAR REEDUCATION: CPT

## 2021-01-07 NOTE — PROGRESS NOTES
PT DAILY TREATMENT NOTE 11    Patient Name: Holly Rivers  Date:2021  : 1965  [x]  Patient  Verified  Payor: BLUE CROSS / Plan: [facility] PPO / Product Type: PPO /    In time: 10:18  Out time: 11:01  Total Treatment Time (min): 43  Visit #: 4 of 8    Medicare/BCBS Only   Total Timed Codes (min):  43 1:1 Treatment Time:  40       Treatment Area: Right shoulder pain [M25.511]    SUBJECTIVE  Pain Level (0-10 scale): 2/10  Any medication changes, allergies to medications, adverse drug reactions, diagnosis change, or new procedure performed?: [x] No    [] Yes (see summary sheet for update)  Subjective functional status/changes:   [] No changes reported  Patient stated that his shoulder is just a little sore. Patient stated he thinks his shoulder is getting better. OBJECTIVE    Modality rationale: PD   Min Type Additional Details    [] Estim:  []Unatt       []IFC  []Premod                        []Other:  []w/ice   []w/heat  Position:  Location:    [] Estim: []Att    []TENS instruct  []NMES                    []Other:  []w/US   []w/ice   []w/heat  Position:  Location:    []  Traction: [] Cervical       []Lumbar                       [] Prone          []Supine                       []Intermittent   []Continuous Lbs:  [] before manual  [] after manual    []  Ultrasound: []Continuous   [] Pulsed                           []1MHz   []3MHz W/cm2:  Location:    []  Iontophoresis with dexamethasone         Location: [] Take home patch   [] In clinic    []  Ice     []  heat  []  Ice massage  []  Laser   []  Anodyne Position:  Location:    []  Laser with stim  []  Other:  Position:  Location:    []  Vasopneumatic Device Pressure:       [] lo [] med [] hi   Temperature: [] lo [] med [] hi   [] Skin assessment post-treatment:  []intact []redness- no adverse reaction    []redness  adverse reaction:     25 min Therapeutic Exercise:  [x] See flow sheet : focus on improving right shoulder ROM. Gentle PROM into flexion. No overpressure applied. Rationale: increase ROM to improve the patients ability to perform ADLs safely and efficiently     8 min Neuromuscular Re-education:  [x]  See flow sheet : focus on improving muscular activation of scapula to improve scapulothoracic and GHJ mechanics. Rationale: increase ROM, increase strength, improve coordination and increase proprioception  to improve the patients ability to perform household management safely. 10 min Manual Therapy:  STM along right UT/levator scap and along vertebral border of scap. Gentle downward scap glides    The manual therapy interventions were performed at a separate and distinct time from the therapeutic activities interventions. Rationale: decrease pain, increase ROM and increase tissue extensibility to increase ease with ADLs. With   [] TE   [] TA   [] neuro   [] other: Patient Education: [x] Review HEP    [] Progressed/Changed HEP based on:   [] positioning   [] body mechanics   [] transfers   [] heat/ice application    [] other:      Other Objective/Functional Measures: AAROM supine with wand Flex: 155deg with no pain reported. Pain Level (0-10 scale) post treatment: 0/10    ASSESSMENT/Changes in Function: Therapist provided cues for correct technique with exercises. Good return demosntration by patient. Tightness noted at right UT/levator scap region that was addressed with manual. Patient reported no pain with manual and no pain at end of the session. Patient will continue to benefit from skilled PT services to modify and progress therapeutic interventions, address functional mobility deficits, address ROM deficits, address strength deficits, analyze and address soft tissue restrictions, analyze and cue movement patterns, analyze and modify body mechanics/ergonomics and assess and modify postural abnormalities to attain remaining goals.      []  See Plan of Care  []  See progress note/recertification  []  See Discharge Summary         Progress towards goals / Updated goals:  Short Term Goals: To be accomplished in 3 weeks:  1. Patient will subjectively report full compliance with prescribed HEP. PN: Met, Pt reports performing HEP. 12/1/20  2. Patient will demonstrate right shoulder flexion and abduction PROM >/= 140 degrees to improve ease with dressing. PN:  Right Shoulder Flexion PROM 146 deg, Right Shoulder Abduction PROM 130 deg, 12/2/2020  Current: Goal met: right shoulder flexion PROM 140 deg, abduction PROM 140 deg. 1/5/20  3. Patient will demonstrate right shoulder IR (90 deg abduction) PROM >/= 40 degrees to improve ease with self-care ADLs. PN:  Right Shoulder IR (90 deg abduction) PROM = 10 deg, 12/15/2020  Current: Progressing, Right Shoulder IR (90 deg abduction) PROM = 30 deg, 12/29/2020     Long Term Goals: To be accomplished in 6 weeks:  1. Patient will demonstrate a significant functional improvement as demonstrated by a score of >/= 65 on FOTO. PN:  FOTO = 55, 12/22/2020  2. Patient will demonstrate right shoulder flexion and abduction AROM >/= 140 degrees to improve ease with overhead reach. PN: Flexion AROM 98 deg, Abduction AROM, Abduction AROM 82 deg, 12/22/2020  Current: Progressing, Flexion AROM 112 deg, Abduction AROM, Abduction AROM 86 deg, 12/29/2020  3. Patient will demonstrate right shoulder flexion and abduction MMT >/= 4+/5 to improve ease with functional lifting.   PN: , Flexion MMT 2+/5, Abduction MMT 2+/5, 12/22/2020    PLAN  []  Upgrade activities as tolerated     [x]  Continue plan of care  []  Update interventions per flow sheet       []  Discharge due to:_  []  Other:_       Joey Evans, PT 1/7/2021  10:24 AM    Future Appointments   Date Time Provider Manoj Caruso   1/12/2021 11:00 AM Amol Espinal, PTA [facility]   1/14/2021 10:15 AM Peyton Yeboah, PT [facility]   1/19/2021 10:15 AM Peyton Yeboah, PT [facility]   1/21/2021 10:15 AM Maxine Kahn PTA [facility]   1/27/2021 10:15 AM MONA Serra [facility] BS AMB

## 2021-01-12 ENCOUNTER — HOSPITAL ENCOUNTER (OUTPATIENT)
Dept: PHYSICAL THERAPY | Age: 56
Discharge: HOME | End: 2021-01-12
Attending: ORTHOPAEDIC SURGERY
Payer: COMMERCIAL

## 2021-01-12 PROCEDURE — 97112 NEUROMUSCULAR REEDUCATION: CPT

## 2021-01-12 PROCEDURE — 97110 THERAPEUTIC EXERCISES: CPT

## 2021-01-12 PROCEDURE — 97140 MANUAL THERAPY 1/> REGIONS: CPT

## 2021-01-12 NOTE — PROGRESS NOTES
PT DAILY TREATMENT NOTE     Patient Name: Jerrica Saldana  Date:2021  : 1965  [x]  Patient  Verified  Payor: BLUE CROSS / Plan: [facility] PPO / Product Type: PPO /    In time:1105  Out time:1201  Total Treatment Time (min): 56  Visit #: 5 of 8    Medicare/BCBS Only   Total Timed Codes (min):  46 1:1 Treatment Time:  40       Treatment Area: Right shoulder pain [M25.511]    SUBJECTIVE  Pain Level (0-10 scale): 0  Any medication changes, allergies to medications, adverse drug reactions, diagnosis change, or new procedure performed?: [x] No    [] Yes (see summary sheet for update)  Subjective functional status/changes:   [] No changes reported  Patient     OBJECTIVE    Modality rationale: decrease inflammation and decrease pain to improve the patients ability to improve ease with sleep   Min Type Additional Details     10 [x]? ???  Ice     []????  heat  []????  Ice massage Position: Seated  Location: Right Shoulder, Post-tx        20 min Therapeutic Exercise:  [x]? ??? See flow sheet : Emphasis placed on promotion of right shoulder PROM/AAROM and maintenance of elbow/wrist/hand PROM/AROM and strength   Rationale: increase ROM and increase strength to improve the patients ability to improve post-surgical recovery.    18 min Neuromuscular Education:  [x]? ??? See flow sheet : Emphasis placed on improving activation and recruitment of the scapulothoracic and glenohumeral musculature and improving scapulohumeral rhythm with UE elevation   Rationale: increase ROM and increase strength to improve the patients ability to improve post-surgical recovery.      8 min Manual Therapy:    Supine, Right Shoulder Passive Physiological Grade II-III Mobilization - Flexion, Scaption, Abduction  Supine, Right GH Inferior Grade I-III Mobilization (OPP)  Supine, Right GH AP Grade I-II Mobilization (OPP)   The manual therapy interventions were performed at a separate and distinct time from the therapeutic activities interventions. Rationale: decrease pain, increase ROM and increase tissue extensibility to improve ease with dressing. With   [] TE   [] TA   [] neuro   [] other: Patient Education: [x] Review HEP    [] Progressed/Changed HEP based on:   [] positioning   [] body mechanics   [] transfers   [] heat/ice application    [] other:      Other Objective/Functional Measures:   Right Shoulder Flexion MMT 4+/5, Right Shoulder Abduction MMT 5/5     Pain Level (0-10 scale) post treatment: 0    ASSESSMENT/Changes in Function: With ability to add light resistance with exercise performane within clinic with good tolerance demonstrated with maintenance of correct scapulohumeral mechanics with patient objectively with a significant improvement in right shoulder strength. With loss of PROM most significant in the directions of abduction and internal rotation. Patient will continue to benefit from skilled PT services to modify and progress therapeutic interventions, address functional mobility deficits, address ROM deficits, address strength deficits, analyze and address soft tissue restrictions, analyze and cue movement patterns, analyze and modify body mechanics/ergonomics and assess and modify postural abnormalities to attain remaining goals. []  See Plan of Care  []  See progress note/recertification  []  See Discharge Summary         Progress towards goals / Updated goals:    Short Term Goals: To be accomplished in 3 weeks:  1. Patient will subjectively report full compliance with prescribed HEP. PN: Met, Pt reports performing HEP. 12/1/20  2. Patient will demonstrate right shoulder flexion and abduction PROM >/= 140 degrees to improve ease with dressing. PN:  Right Shoulder Flexion PROM 146 deg, Right Shoulder Abduction PROM 130 deg, 12/2/2020  Current: Goal met: right shoulder flexion PROM 140 deg, abduction PROM 140 deg. 1/5/20  3.  Patient will demonstrate right shoulder IR (90 deg abduction) PROM >/= 40 degrees to improve ease with self-care ADLs. PN:  Right Shoulder IR (90 deg abduction) PROM = 10 deg, 12/15/2020  Current: Progressing, Right Shoulder IR (90 deg abduction) PROM = 30 deg, 12/29/2020     Long Term Goals: To be accomplished in 6 weeks:  1. Patient will demonstrate a significant functional improvement as demonstrated by a score of >/= 65 on FOTO. PN:  FOTO = 55, 12/22/2020  2. Patient will demonstrate right shoulder flexion and abduction AROM >/= 140 degrees to improve ease with overhead reach. PN: Flexion AROM 98 deg, Abduction AROM, Abduction AROM 82 deg, 12/22/2020  Current: Progressing, Flexion AROM 112 deg, Abduction AROM, Abduction AROM 86 deg, 12/29/2020  3. Patient will demonstrate right shoulder flexion and abduction MMT >/= 4+/5 to improve ease with functional lifting.   PN: , Flexion MMT 2+/5, Abduction MMT 2+/5, 12/22/2020  Current: Met, Right Shoulder Flexion MMT 4+/5, Right Shoulder Abduction MMT 5/5, 1/12/2021       PLAN  [x]  Upgrade activities as tolerated     [x]  Continue plan of care  []  Update interventions per flow sheet       []  Discharge due to:_  []  Other:_      Josue Stern, PT 1/12/2021  11:13 AM    Future Appointments   Date Time Provider Manoj Caruso   1/14/2021 10:15 AM Matias, [address] [facility]   1/19/2021 10:15 AM Ruth Zelaya, PT [facility]   1/21/2021 10:15 AM Sherry Diego PTA [facility]   1/27/2021 10:15 AM MONA Vásquez BS AMB

## 2021-01-14 ENCOUNTER — HOSPITAL ENCOUNTER (OUTPATIENT)
Dept: PHYSICAL THERAPY | Age: 56
Discharge: HOME | End: 2021-01-14
Attending: ORTHOPAEDIC SURGERY
Payer: COMMERCIAL

## 2021-01-14 PROCEDURE — 97140 MANUAL THERAPY 1/> REGIONS: CPT

## 2021-01-14 PROCEDURE — 97112 NEUROMUSCULAR REEDUCATION: CPT

## 2021-01-14 PROCEDURE — 97110 THERAPEUTIC EXERCISES: CPT

## 2021-01-14 NOTE — PROGRESS NOTES
PT DAILY TREATMENT NOTE     Patient Name: Ki Santa  Date:2021  : 1965  [x]  Patient  Verified  Payor: BLUE CROSS / Plan: [id number] PPO / Product Type: PPO /    In time:1015  Out time:1115  Total Treatment Time (min): 60  Visit #: 6 of 8    Medicare/BCBS Only   Total Timed Codes (min):  50 1:1 Treatment Time:  40       Treatment Area: Right shoulder pain [M25.511]    SUBJECTIVE  Pain Level (0-10 scale): 0  Any medication changes, allergies to medications, adverse drug reactions, diagnosis change, or new procedure performed?: [x] No    [] Yes (see summary sheet for update)  Subjective functional status/changes:   [] No changes reported  Patient reports no adverse response to last treatment session without discomfort with increase in weight/resistance utilized. OBJECTIVE    Modality rationale: decrease inflammation and decrease pain to improve the patients ability to improve ease with sleep   Min Type Additional Details     10 [x]?????  Ice     []?????  heat  []?????  Ice massage Position: Seated  Location: Right Shoulder, Post-tx        25 min Therapeutic Exercise:  [x]? ???? See flow sheet : Emphasis placed on promotion of right shoulder PROM/AAROM and maintenance of elbow/wrist/hand PROM/AROM and strength   Rationale: increase ROM and increase strength to improve the patients ability to improve post-surgical recovery.    15 min Neuromuscular Education:  [x]? ???? See flow sheet : Emphasis placed on improving activation and recruitment of the scapulothoracic and glenohumeral musculature and improving scapulohumeral rhythm with UE elevation   Rationale: increase ROM and increase strength to improve the patients ability to improve post-surgical recovery.      10 min Manual Therapy:    Supine, Right Shoulder Passive Physiological Grade II-III Mobilization - Flexion, Scaption, Abduction  Supine, Right GH Inferior Grade I-III Mobilization (OPP, inc deg abd)  Supine, Right [address] [facility] [place] AP Grade I-II Mobilization (OPP, 90/90 abd + IR))   The manual therapy interventions were performed at a separate and distinct time from the therapeutic activities interventions. Rationale: decrease pain, increase ROM and increase tissue extensibility to improve ease with dressing. With   [] TE   [] TA   [] neuro   [] other: Patient Education: [x] Review HEP    [] Progressed/Changed HEP based on:   [] positioning   [] body mechanics   [] transfers   [] heat/ice application    [] other:      Other Objective/Functional Measures:   Right Shoulder IR (90 deg abduction) PROM = 35 deg     Pain Level (0-10 scale) post treatment: 0    ASSESSMENT/Changes in Function: With continued progression of functional strengthening with maintenance of correct scapulohumeral mechanics. With continued glenohumeral inferior capsular hypomobility with loss of end-range shoulder abduction and internal rotation. Patient will continue to benefit from skilled PT services to modify and progress therapeutic interventions, address functional mobility deficits, address ROM deficits, address strength deficits, analyze and address soft tissue restrictions, analyze and cue movement patterns, analyze and modify body mechanics/ergonomics, assess and modify postural abnormalities and instruct in home and community integration to attain remaining goals. []  See Plan of Care  []  See progress note/recertification  []  See Discharge Summary         Progress towards goals / Updated goals:    Short Term Goals: To be accomplished in 3 weeks:  1. Patient will subjectively report full compliance with prescribed HEP. PN: Met, Pt reports performing HEP. 12/1/20  2. Patient will demonstrate right shoulder flexion and abduction PROM >/= 140 degrees to improve ease with dressing.   PN:  Right Shoulder Flexion PROM 146 deg, Right Shoulder Abduction PROM 130 deg, 12/2/2020  Current: Met, Right Shoulder Flexion PROM 140 deg, Right Shoulder Abduction PROM 140 deg. 1/5/20  3. Patient will demonstrate right shoulder IR (90 deg abduction) PROM >/= 40 degrees to improve ease with self-care ADLs. PN:  Right Shoulder IR (90 deg abduction) PROM = 10 deg, 12/15/2020  Current: Progressing, Right Shoulder IR (90 deg abduction) PROM = 35 deg, 1/14/2021     Long Term Goals: To be accomplished in 6 weeks:  1. Patient will demonstrate a significant functional improvement as demonstrated by a score of >/= 65 on FOTO. PN:  FOTO = 55, 12/22/2020  2. Patient will demonstrate right shoulder flexion and abduction AROM >/= 140 degrees to improve ease with overhead reach. PN: Flexion AROM 98 deg, Abduction AROM, Abduction AROM 82 deg, 12/22/2020  Current: Progressing, Flexion AROM 112 deg, Abduction AROM 86 deg, 12/29/2020  3. Patient will demonstrate right shoulder flexion and abduction MMT >/= 4+/5 to improve ease with functional lifting.   PN: Flexion MMT 2+/5, Abduction MMT 2+/5, 12/22/2020  Current: Met, Right Shoulder Flexion MMT 4+/5, Right Shoulder Abduction MMT 5/5, 1/12/2021       PLAN  [x]  Upgrade activities as tolerated     [x]  Continue plan of care  []  Update interventions per flow sheet       []  Discharge due to:_  []  Other:_      Gely Massey PT 1/14/2021  9:43 AM    Future Appointments   Date Time Provider Manoj Caruso   1/14/2021 10:15 AM Dale Croft PT [facility]   1/19/2021 10:15 AM Dale Croft PT [facility]   1/21/2021 10:15 AM Marivel Cutler PTA [facility]   1/27/2021 10:15 AM MONA Banks BS AMB

## 2021-01-19 ENCOUNTER — HOSPITAL ENCOUNTER (OUTPATIENT)
Dept: PHYSICAL THERAPY | Age: 56
End: 2021-01-19
Attending: ORTHOPAEDIC SURGERY
Payer: COMMERCIAL

## 2021-01-21 ENCOUNTER — HOSPITAL ENCOUNTER (OUTPATIENT)
Dept: PHYSICAL THERAPY | Age: 56
End: 2021-01-21
Attending: ORTHOPAEDIC SURGERY
Payer: COMMERCIAL

## 2021-01-22 ENCOUNTER — APPOINTMENT (OUTPATIENT)
Dept: PHYSICAL THERAPY | Age: 56
End: 2021-01-22
Attending: ORTHOPAEDIC SURGERY
Payer: COMMERCIAL

## 2021-02-09 ENCOUNTER — OFFICE VISIT (OUTPATIENT)
Dept: ORTHOPEDIC SURGERY | Age: 56
End: 2021-02-09
Payer: COMMERCIAL

## 2021-02-09 VITALS
HEIGHT: 71 IN | HEART RATE: 99 BPM | BODY MASS INDEX: 30.8 KG/M2 | WEIGHT: 220 LBS | TEMPERATURE: 98.7 F | RESPIRATION RATE: 16 BRPM | OXYGEN SATURATION: 98 % | SYSTOLIC BLOOD PRESSURE: 142 MMHG | DIASTOLIC BLOOD PRESSURE: 85 MMHG

## 2021-02-09 DIAGNOSIS — M75.111 INCOMPLETE ROTATOR CUFF TEAR OR RUPTURE OF RIGHT SHOULDER, NOT SPECIFIED AS TRAUMATIC: Primary | ICD-10-CM

## 2021-02-09 DIAGNOSIS — S46.101A INJURY OF TENDON OF LONG HEAD OF RIGHT BICEPS, INITIAL ENCOUNTER: ICD-10-CM

## 2021-02-09 DIAGNOSIS — Z98.890 STATUS POST ARTHROSCOPY OF RIGHT SHOULDER: ICD-10-CM

## 2021-02-09 PROCEDURE — 99213 OFFICE O/P EST LOW 20 MIN: CPT | Performed by: ORTHOPAEDIC SURGERY

## 2021-02-09 NOTE — PROGRESS NOTES
Prasad Roblero  1965     HISTORY OF PRESENT ILLNESS  Prasad Roblero is a 63 y.o. male who presents today for evaluation s/p Right shoulder arthroscopic rotator cuff repair, biceps tenodesis and subacromial decompression on 11/3/20. Patient has been going to PT. Has been taking diclofenac for pain. He has been compliant with his exercises and restrictions. He has gone to PT and doing well. He missed a couple weeks due to having to his mother having covid. He did his exercises occasionally but not regularly. He is ready to get back into PT and work on his ROM and strength. Patient denies any fever, chills, chest pain, shortness of breath or calf pain. There are no new illness or injuries to report since last seen in the office. PHYSICAL EXAM:   Visit Vitals  BP (!) 142/85 (BP 1 Location: Left upper arm)   Pulse 99   Temp 98.7 °F (37.1 °C) (Temporal)   Resp 16   Ht 5' 11\" (1.803 m)   Wt 220 lb (99.8 kg)   SpO2 98%   BMI 30.68 kg/m²      The patient is a well-developed, well-nourished male in no acute distress. The patient is alert and oriented times three. The patient appears to be well groomed. Mood and affect are normal.  ORTHOPEDIC EXAM of right shoulder:  Inspection: swelling not present,  Bruising not present  Incision well healed  Passive glenohumeral abduction 0-90 degrees, 180 PFF, 120 AFF, 50 PER  Strength: n/a  2+ distal pulses        IMPRESSION:  s/p Right shoulder arthroscopic rotator cuff repair, biceps tenodesis and subacromial decompression    PLAN:   Pt doing well post operatively  His motion has improved since last visit. He is a little stiff but this can be improved in PT. Will continue PT and exercises. Another order was placed today. Stressed to patient that nothing causes an increase in pain. Pt not given a refill of pain medication today.   RTC 4 weeks      JOCELIN Smith and Spine Specialist

## 2021-02-10 ENCOUNTER — HOSPITAL ENCOUNTER (OUTPATIENT)
Dept: PHYSICAL THERAPY | Age: 56
Discharge: HOME | End: 2021-02-10
Attending: ORTHOPAEDIC SURGERY
Payer: COMMERCIAL

## 2021-02-10 PROCEDURE — 97110 THERAPEUTIC EXERCISES: CPT

## 2021-02-10 PROCEDURE — 97140 MANUAL THERAPY 1/> REGIONS: CPT

## 2021-02-10 PROCEDURE — 97112 NEUROMUSCULAR REEDUCATION: CPT

## 2021-02-10 NOTE — PROGRESS NOTES
[facility]              [address]        [place], [address]  [phone] fax    Progress Note  Patient name: Hu Valdes Start of Care: 2020   Referral source: Jairo White, [address] : 1965   Medical/Treatment Diagnosis: Right shoulder pain [M25.511]  Payor: [facility] / Plan: [facility] PPO / Product Type: PPO /  Onset Date:  DoS 11/3/2020     Prior Hospitalization: see medical history Provider#: [id number]   Medications: Verified on Patient Summary List    Comorbidities: Arthritis, HTN, Right Elbow Surgery [facility] ), Lumbar Surgery x2, Right Knee Menisectomy (DoS ), HTN   Prior Level of Function: RHD, (I) Functional ADLs, Disabled (does not work), (I) Self-Care ADLs, (I) Driving  Visits from [facility] Care: 16    Missed Visits: 3    Established Goals:      Short Term Goals: To be accomplished in 3 weeks:  1. Patient will subjectively report full compliance with prescribed HEP. Last PN: Met, Pt reports performing HEP  2. Patient will demonstrate right shoulder flexion and abduction PROM >/= 140 degrees to improve ease with dressing. Last PN:  Right Shoulder Flexion PROM 146 deg, Right Shoulder Abduction PROM 130 deg, 2020  Current: Met, Right Shoulder Flexion PROM 140 deg, Right Shoulder Abduction PROM 140 deg  3. Patient will demonstrate right shoulder IR (90 deg abduction) PROM >/= 40 degrees to improve ease with self-care ADLs. Last PN:  Right Shoulder IR (90 deg abduction) PROM = 10 deg  Current: Met, Right Shoulder IR (90 deg abduction) PROM = 42 deg     Long Term Goals: To be accomplished in 6 weeks:  1. Patient will demonstrate a significant functional improvement as demonstrated by a score of >/= 65 on FOTO. Last PN:  FOTO = 55  Current: Progressing, FOTO = 61  2. Patient will demonstrate right shoulder flexion and abduction AROM >/= 140 degrees to improve ease with overhead reach.   Last PN: Flexion AROM 98 deg, Abduction AROM, Abduction AROM 82 deg  Current: Progressing, Flexion AROM  140 deg, Abduction AROM 115 deg  3. Patient will demonstrate right shoulder flexion and abduction MMT >/= 4+/5 to improve ease with functional lifting. Last PN: Flexion MMT 2+/5, Abduction MMT 2+/5  Current: Met, Right Shoulder Flexion MMT 4+/5, Right Shoulder Abduction MMT 5/5    Key Functional Changes: See goals above. Updated Goals: Goals to be achieved in 4 weeks. 1. Patient will demonstrate right shoulder functional IR >/= L1 to improve ease with self-care ADLS. 2/10/2021: Functional IR = PSIS  2. Patient will demonstrate ability to perform right UE overhead reach with 3 lb handweight x10 repetitions without pain to improve ease with household ADLs. 2/10/2021: NT    ASSESSMENT/RECOMMENDATIONS:    Patient presents today for re-evaluation with patient having not been seen since 1/14/2021 secondary to personal illness. Secondary to lack of attendance over last 4 weeks with limited progression towards created therapeutic goals. Patient objectively has demonstrated good progress upon assessment of shoulder PROM, AROM and strength but continues to demonstrate limitations with overhead lifting and functional IR thus resulting in continued functional limitations with performance of self-care and household ADLs. To continue with receival of PT services with further progression of functional strengthening to improve patient ease with return to PLOF. Patient will continue to benefit from skilled PT services to modify and progress therapeutic interventions, address functional mobility deficits, address ROM deficits, address strength deficits, analyze and address soft tissue restrictions, analyze and cue movement patterns, analyze and modify body mechanics/ergonomics and assess and modify postural abnormalities to attain remaining goals.     [x]Continue therapy per initial plan/protocol at a frequency of  2 x per week for 4 weeks  []Continue therapy with the following recommended changes:_____________________      _____________________________________________________________________  []Discontinue therapy progressing towards or have reached established goals  []Discontinue therapy due to lack of appreciable progress towards goals  []Discontinue therapy due to lack of attendance or compliance  []Await Physician's recommendations/decisions regarding therapy  []Other:________________________________________________________________    Thank you for this referral.    Isaiah Mirza, PT 2/10/2021 2:34 PM  NOTE TO PHYSICIAN:  PLEASE COMPLETE THE ORDERS BELOW AND   FAX TO [facility]: [phone]  If you are unable to process this request in 24 hours please contact our office: [phone]    []  I have read the above report and request that my patient continue as recommended. []  I have read the above report and request that my patient continue therapy with the following changes/special instructions:________________________________________  []I have read the above report and request that my patient be discharged from therapy.     Physician's Signature:____________Date:_________TIME:________     MONA Jones  ** Signature, Date and Time must be completed for valid certification **

## 2021-02-10 NOTE — PROGRESS NOTES
PT DAILY TREATMENT NOTE     Patient Name: Mami Banerjee  Date:2/10/2021  : 1965  [x]  Patient  Verified  Payor: BLUE CROSS / Plan: [facility] PPO / Product Type: PPO /    In time:11:45  Out time:12:32  Total Treatment Time (min): 47  Visit #: 7 of 8    Medicare/BCBS Only   Total Timed Codes (min):  47 1:1 Treatment Time:  47       Treatment Area: Right shoulder pain [M25.511]    SUBJECTIVE  Pain Level (0-10 scale): 0  Any medication changes, allergies to medications, adverse drug reactions, diagnosis change, or new procedure performed?: [x] No    [] Yes (see summary sheet for update)  Subjective functional status/changes:   [] No changes reported  Pt reports not having much pain but shoulder is stiff    OBJECTIVE    27 min Therapeutic Exercise:  [x] See flow sheet :   Rationale: increase ROM and increase strength to improve the patients ability to perform ADLs    10 min Neuromuscular Re-education:  [x]  See flow sheet :   Rationale: increase strength, improve coordination and increase proprioception  to improve the patients ability to perform functional tasks    10 min Manual Therapy:  scap mobs, post/inf GH jt mobs, DTM to infra and teres, PROM to right shoulder    The manual therapy interventions were performed at a separate and distinct time from the therapeutic activities interventions.   Rationale: decrease pain, increase ROM and increase tissue extensibility to improve functional mobility            With   [] TE   [] TA   [] neuro   [] other: Patient Education: [x] Review HEP    [] Progressed/Changed HEP based on:   [] positioning   [] body mechanics   [] transfers   [] heat/ice application    [] other:      Other Objective/Functional Measures:   FOTO 61  AROM right shoulder flex 140* abd 115*  PROM shoulder IR 42*  FIR to PSIS LISA behind ear  PROM [id number] abd 120    Pain Level (0-10 scale) post treatment: 0    ASSESSMENT/Changes in Function:  Pt returned to PT today after 3 week break due to illness. Presented with incr'd shoulder stiffness upon arrival that greatly improved with therex and manual therapy. Pt has made good progress towards improved PROM, AROM, and strength. Cont's to be limited with OH reaching and reaching behind back. Pt denies incr'd pain throughout treatment but reports ms fatigue. Discussed cont'd PT to cont improving shoulder AROM, strength, and improved functional reaching. Patient will continue to benefit from skilled PT services to modify and progress therapeutic interventions, address functional mobility deficits, address ROM deficits, address strength deficits, analyze and address soft tissue restrictions, analyze and cue movement patterns, analyze and modify body mechanics/ergonomics and assess and modify postural abnormalities to attain remaining goals. []  See Plan of Care  []  See progress note/recertification  []  See Discharge Summary         Progress towards goals / Updated goals:  Short Term Goals: To be accomplished in 3 weeks:  1. Patient will subjectively report full compliance with prescribed HEP. PN: Met, Pt reports performing HEP. 12/1/20  2. Patient will demonstrate right shoulder flexion and abduction PROM >/= 140 degrees to improve ease with dressing. PN:  Right Shoulder Flexion PROM 146 deg, Right Shoulder Abduction PROM 130 deg, 12/2/2020  Current: Met, Right Shoulder Flexion PROM 140 deg, Right Shoulder Abduction PROM 140 deg. 1/5/20; PROM Flex 160*, abd 120* 2/10/2021  3. Patient will demonstrate right shoulder IR (90 deg abduction) PROM >/= 40 degrees to improve ease with self-care ADLs. PN:  Right Shoulder IR (90 deg abduction) PROM = 10 deg, 12/15/2020  Current: Progressing, Right Shoulder IR (90 deg abduction) PROM = 35 deg, 1/14/2021; PROM= 42* 2/10/2021     Long Term Goals: To be accomplished in 6 weeks:  1.  Patient will demonstrate a significant functional improvement as demonstrated by a score of >/= 65 on FOTO.  PN:  FOTO = 55, 12/22/2020  Current: Progressing, 61 2/10/2021  2. Patient will demonstrate right shoulder flexion and abduction AROM >/= 140 degrees to improve ease with overhead reach. PN: Flexion AROM 98 deg, Abduction AROM, Abduction AROM 82 deg, 12/22/2020  Current: Progressing, Flexion AROM  140* deg, Abduction AROM 115 deg, 2/10/2021  3. Patient will demonstrate right shoulder flexion and abduction MMT >/= 4+/5 to improve ease with functional lifting. PN: Flexion MMT 2+/5, Abduction MMT 2+/5, 12/22/2020  Current: Met, Right Shoulder Flexion MMT 4+/5, Right Shoulder Abduction MMT 5/5, 1/12/2021    Updated Goals: Goals to be achieved in 4 weeks. 1. Patient will demonstrate right shoulder functional IR >/= L1 to improve ease with self-care ADLS. 2/10/2021: Functional IR = PSIS  2. Patient will demonstrate ability to perform right UE overhead reach with 3 lb handweight x10 repetitions without pain to improve ease with household ADLs.   2/10/2021: NT    PLAN  []  Upgrade activities as tolerated     [x]  Continue plan of care  []  Update interventions per flow sheet       []  Discharge due to:_  []  Other:_      Jose Andino PTA 2/10/2021  11:45 AM    Future Appointments   Date Time Provider Manoj Caruso   3/9/2021  1:15 PM MONA Marquez [facility] BS AMB

## 2021-02-12 ENCOUNTER — HOSPITAL ENCOUNTER (OUTPATIENT)
Dept: PHYSICAL THERAPY | Age: 56
Discharge: HOME | End: 2021-02-12
Attending: ORTHOPAEDIC SURGERY
Payer: COMMERCIAL

## 2021-02-12 PROCEDURE — 97110 THERAPEUTIC EXERCISES: CPT

## 2021-02-12 PROCEDURE — 97112 NEUROMUSCULAR REEDUCATION: CPT

## 2021-02-12 PROCEDURE — 97140 MANUAL THERAPY 1/> REGIONS: CPT

## 2021-02-12 NOTE — PROGRESS NOTES
PT DAILY TREATMENT NOTE     Patient Name: Ki Santa  Date:2021  : 1965  [x]  Patient  Verified  Payor: BLUE CROSS / Plan: [facility] PPO / Product Type: PPO /    In time:1:13  Out time:2:08  Total Treatment Time (min): 55  Visit #: 1 of 8    Medicare/BCBS Only   Total Timed Codes (min):  45 1:1 Treatment Time:  45       Treatment Area: Right shoulder pain [M25.511]    SUBJECTIVE  Pain Level (0-10 scale): 0  Any medication changes, allergies to medications, adverse drug reactions, diagnosis change, or new procedure performed?: [x] No    [] Yes (see summary sheet for update)  Subjective functional status/changes:   [] No changes reported  Patient reports he has not been performing his home exercises. OBJECTIVE                Modality rationale: decrease inflammation and decrease pain to improve the patients ability to improve ease with sleep   Min Type Additional Details     10 [x]???????  Ice     []???????  heat  []???????  Ice massage Position: Seated  Location: Right Shoulder, Post-tx        25 min Therapeutic Exercise:  [x]??????? See flow sheet : Emphasis placed on promotion of right shoulder PROM and maintenance of elbow/wrist/hand PROM/AROM and strength   Rationale: increase ROM and increase strength to improve the patients ability to improve post-surgical recovery.        10 min Neuromuscular Education:  [x]? ???? See flow sheet : Emphasis placed on improving activation and recruitment of the scapulothoracic and glenohumeral musculature and improving scapulohumeral rhythm with UE elevation   Rationale: increase ROM and increase strength to improve the patients ability to improve post-surgical recovery.     10 min Manual Therapy:    Supine, Right Shoulder PROM - Flexion, Scaption, Abduction, ER/IR     The manual therapy interventions were performed at a separate and distinct time from the therapeutic activities interventions.   Rationale: decrease pain, increase ROM and increase tissue extensibility to improve ease with dressing.                                   With   [] TE   [] TA   [] neuro   [] other: Patient Education: [x] Review HEP    [] Progressed/Changed HEP based on:   [] positioning   [] body mechanics   [] transfers   [] heat/ice application    [] other:      Other Objective/Functional Measures: patient able to place a 2# on an overhead shelf with no problem. Pain Level (0-10 scale) post treatment: 0    ASSESSMENT/Changes in Function: Educated patient on importance of performing HEP to help notice carry over an improve strength. Provided patient with updated HEP. Patient will continue to benefit from skilled PT services to modify and progress therapeutic interventions, address functional mobility deficits, address ROM deficits, address strength deficits and analyze and address soft tissue restrictions to attain remaining goals. []  See Plan of Care  []  See progress note/recertification  []  See Discharge Summary         Progress towards goals / Updated goals:  Short Term Goals: To be accomplished in 3 weeks:  1. Patient will subjectively report full compliance with prescribed HEP. PN: Met, Pt reports performing HEP. 12/1/20  2. Patient will demonstrate right shoulder flexion and abduction PROM >/= 140 degrees to improve ease with dressing. PN: Met, Right Shoulder Flexion PROM 140 deg, Right Shoulder Abduction PROM 140 deg. 1/5/20; PROM Flex 160*, abd 120* 2/10/2021  3. Patient will demonstrate right shoulder IR (90 deg abduction) PROM >/= 40 degrees to improve ease with self-care ADLs. PN: Right Shoulder IR (90 deg abduction) PROM = 35 deg, 1/14/2021; PROM= 42* 2/10/2021     Long Term Goals: To be accomplished in 6 weeks:  1. Patient will demonstrate a significant functional improvement as demonstrated by a score of >/= 65 on FOTO. PN:   61 2/10/2021  2.  Patient will demonstrate right shoulder flexion and abduction AROM >/= 140 degrees to improve ease with overhead reach. PN: Flexion AROM  140* deg, Abduction AROM 115 deg, 2/10/2021  3. Patient will demonstrate right shoulder flexion and abduction MMT >/= 4+/5 to improve ease with functional lifting. PN:  Met, Right Shoulder Flexion MMT 4+/5, Right Shoulder Abduction MMT 5/5, 1/12/2021     Updated Goals: Goals to be achieved in 4 weeks. 1. Patient will demonstrate right shoulder functional IR >/= L1 to improve ease with self-care ADLS. 2/10/2021: Functional IR = PSIS  2. Patient will demonstrate ability to perform right UE overhead reach with 3 lb handweight x10 repetitions without pain to improve ease with household ADLs.   2/10/2021: NT       PLAN  []  Upgrade activities as tolerated     [x]  Continue plan of care  []  Update interventions per flow sheet       []  Discharge due to:_  []  Other:_      Bob Pond PTA 2/12/2021  1:19 PM    Future Appointments   Date Time Provider Manoj Caruso   2/18/2021 12:30 PM Rita Rea DPT [facility] [id number]   2/19/2021 11:00 AM Tyler Mohan, PT [facility] [address]   2/23/2021 10:15 AM Docia Isra, PTA [facility] [address]   2/25/2021 11:00 AM Docia Isra, PTA [facility] [address]   3/2/2021 10:15 AM Docia Isra, PTA [facility] [address]   3/4/2021 10:15 AM Tyler Mohan, PT [facility] [address]   3/9/2021  1:15 PM MONA Vasquez [facility] BS AMB

## 2021-02-18 ENCOUNTER — HOSPITAL ENCOUNTER (OUTPATIENT)
Dept: PHYSICAL THERAPY | Age: 56
Discharge: HOME | End: 2021-02-18
Attending: ORTHOPAEDIC SURGERY
Payer: COMMERCIAL

## 2021-02-18 PROCEDURE — 97112 NEUROMUSCULAR REEDUCATION: CPT

## 2021-02-18 PROCEDURE — 97110 THERAPEUTIC EXERCISES: CPT

## 2021-02-18 PROCEDURE — 97140 MANUAL THERAPY 1/> REGIONS: CPT

## 2021-02-18 NOTE — PROGRESS NOTES
PT DAILY TREATMENT NOTE     Patient Name: Gene Schulz  Date:2021  : 1965  [x]  Patient  Verified  Payor: BLUE CROSS / Plan: [facility] PPO / Product Type: PPO /    In time: 12:34pm Out time: 1:32pm  Total Treatment Time (min): 58  Visit #: 2 of 8    Medicare/BCBS Only   Total Timed Codes (min):  48 1:1 Treatment Time:  48       Treatment Area: Right shoulder pain [M25.511]    SUBJECTIVE  Pain Level (0-10 scale): 0  Any medication changes, allergies to medications, adverse drug reactions, diagnosis change, or new procedure performed?: [x] No    [] Yes (see summary sheet for update)  Subjective functional status/changes:   [] No changes reported  Patient reports \"I just need it to get stronger\". Confirms compliance with most recently issued HEP. OBJECTIVE                Modality rationale: decrease inflammation and decrease pain to improve the patients ability to improve ease with sleep   Min Type Additional Details     10 [x]???????  Ice     []???????  heat  []???????  Ice massage Position: Seated  Location: Right Shoulder, Post-tx        25 min Therapeutic Exercise:  [x]??????? See flow sheet : Emphasis placed on promotion of right shoulder PROM and maintenance of elbow/wrist/hand PROM/AROM and strength   Rationale: increase ROM and increase strength to improve the patients ability to improve post-surgical recovery.    13 min Neuromuscular Education:  [x]? ???? See flow sheet : Emphasis placed on improving activation and recruitment of the scapulothoracic and glenohumeral musculature and improving scapulohumeral rhythm with UE elevation   Rationale: increase ROM and increase strength to improve the patients ability to improve post-surgical recovery.     10 min Manual Therapy:    Gr III GH inf mobs of R shld w/ active movement in L SL; STM/TPr of R teres major and lat in L S/L     The manual therapy interventions were performed at a separate and distinct time from the therapeutic activities interventions. Rationale: decrease pain, increase ROM and increase tissue extensibility to improve ease with dressing.                                With   [x] TE   [] TA   [x] neuro   [] other: Patient Education: [x] Review HEP    [] Progressed/Changed HEP based on:   [] positioning   [] body mechanics   [] transfers   [] heat/ice application    [] other:      Other Objective/Functional Measures:   See goals below    Pain Level (0-10 scale) post treatment: 0    ASSESSMENT/Changes in Function: MT techniques performed with improvements in AROM noted after. Continued progression of activity program with emphasis on scapular stability/mechanics during activity performance. . Mod verbal and tactile cuing required for optimization of scapular depression. Pt fatigues quickly into activities     Patient will continue to benefit from skilled PT services to modify and progress therapeutic interventions, address functional mobility deficits, address ROM deficits, address strength deficits and analyze and address soft tissue restrictions to attain remaining goals. [x]  See Plan of Care  []  See progress note/recertification  []  See Discharge Summary         Progress towards goals / Updated goals:  Short Term Goals: To be accomplished in 3 weeks:  1. Patient will subjectively report full compliance with prescribed HEP. PN: Met, Pt reports performing HEP. 12/1/20  2. Patient will demonstrate right shoulder flexion and abduction PROM >/= 140 degrees to improve ease with dressing. PN: Met, Right Shoulder Flexion PROM 140 deg, Right Shoulder Abduction PROM 140 deg. 1/5/20; PROM Flex 160*, abd 120* 2/10/2021  3. Patient will demonstrate right shoulder IR (90 deg abduction) PROM >/= 40 degrees to improve ease with self-care ADLs. PN: Right Shoulder IR (90 deg abduction) PROM = 35 deg, 1/14/2021; PROM= 42* 2/10/2021     Long Term Goals: To be accomplished in 6 weeks:  1.  Patient will demonstrate a significant functional improvement as demonstrated by a score of >/= 65 on FOTO. PN:   61 2/10/2021  2. Patient will demonstrate right shoulder flexion and abduction AROM >/= 140 degrees to improve ease with overhead reach. PN: Flexion AROM  140* deg, Abduction AROM 115 deg, 2/10/2021  Current: Progressing:  AROM  142 deg, Abduction AROM 122 deg, 2/18/2021    3. Patient will demonstrate right shoulder flexion and abduction MMT >/= 4+/5 to improve ease with functional lifting. PN:  Met, Right Shoulder Flexion MMT 4+/5, Right Shoulder Abduction MMT 5/5, 1/12/2021     Updated Goals: Goals to be achieved in 4 weeks. 1. Patient will demonstrate right shoulder functional IR >/= L1 to improve ease with self-care ADLS. 2/10/2021: Functional IR = PSIS  2. Patient will demonstrate ability to perform right UE overhead reach with 3 lb handweight x10 repetitions without pain to improve ease with household ADLs.   2/10/2021: NT       PLAN  [x]  Upgrade activities as tolerated     [x]  Continue plan of care  []  Update interventions per flow sheet       []  Discharge due to:_  []  Other:_      Holly iSlva DPT 2/18/2021  1:19 PM    Future Appointments   Date Time Provider Manoj Caruso   2/18/2021 12:30 PM Jailyn Melissa DPT [facility]   2/19/2021 11:00 AM Peña Shearer, PT [facility]   2/23/2021 10:15 AM Velveronica Landis, PTA [facility]   2/25/2021 11:00 AM Radha Landis, PTA [facility]   3/2/2021 10:15 AM Radha Landis, PTA [facility]   3/4/2021 10:15 AM Peña Shearer, PT [facility]   3/9/2021  1:15 PM MONA Solis [facility] BS AMB

## 2021-02-19 ENCOUNTER — HOSPITAL ENCOUNTER (OUTPATIENT)
Dept: PHYSICAL THERAPY | Age: 56
End: 2021-02-19
Attending: ORTHOPAEDIC SURGERY
Payer: COMMERCIAL

## 2021-02-23 ENCOUNTER — HOSPITAL ENCOUNTER (OUTPATIENT)
Dept: PHYSICAL THERAPY | Age: 56
Discharge: HOME | End: 2021-02-23
Attending: ORTHOPAEDIC SURGERY
Payer: COMMERCIAL

## 2021-02-23 PROCEDURE — 97112 NEUROMUSCULAR REEDUCATION: CPT

## 2021-02-23 PROCEDURE — 97140 MANUAL THERAPY 1/> REGIONS: CPT

## 2021-02-23 PROCEDURE — 97110 THERAPEUTIC EXERCISES: CPT

## 2021-02-23 NOTE — PROGRESS NOTES
PT DAILY TREATMENT NOTE     Patient Name: Jordyn March  Date:2021  : 1965  [x]  Patient  Verified  Payor: BLUE CROSS / Plan: [facility] PPO / Product Type: PPO /    In time:10:15  Out time:11:10  Total Treatment Time (min): 54  Visit #: 3 of 8    Medicare/BCBS Only   Total Timed Codes (min):  45 1:1 Treatment Time:  40       Treatment Area: Right shoulder pain [M25.511]    SUBJECTIVE  Pain Level (0-10 scale): 0  Any medication changes, allergies to medications, adverse drug reactions, diagnosis change, or new procedure performed?: [x] No    [] Yes (see summary sheet for update)  Subjective functional status/changes:   [] No changes reported  Patient reports he has noticed he is able to move his arm above his head a lot more.      OBJECTIVE              Modality rationale: decrease inflammation and decrease pain to improve the patients ability to improve ease with sleep   Min Type Additional Details     10 [x]????????  Ice     []????????  heat  []????????  Ice massage Position: Seated  Location: Right Shoulder, Post-tx        27 min Therapeutic Exercise:  [x]???????? See flow sheet : Emphasis placed on promotion of right shoulder PROM and maintenance of elbow/wrist/hand PROM/AROM and strength   Rationale: increase ROM and increase strength to improve the patients ability to improve post-surgical recovery.        10 min Neuromuscular Education:  [x]?????? See flow sheet : Emphasis placed on improving activation and recruitment of the scapulothoracic and glenohumeral musculature and improving scapulohumeral rhythm with UE elevation   Rationale: increase ROM and increase strength to improve the patients ability to improve post-surgical recovery.     8 min Manual Therapy:    Supine, Right Shoulder PROM - Flexion, Scaption, Abduction, ER/IR      The manual therapy interventions were performed at a separate and distinct time from the therapeutic activities interventions. Rationale: decrease pain, increase ROM and increase tissue extensibility to improve ease with dressing. With   [] TE   [] TA   [] neuro   [] other: Patient Education: [x] Review HEP    [] Progressed/Changed HEP based on:   [] positioning   [] body mechanics   [] transfers   [] heat/ice application    [] other:      Other Objective/Functional Measures: Pt able to place 3# on shelf overhead with no pain or substitutions. Pain Level (0-10 scale) post treatment: 0    ASSESSMENT/Changes in Function: Patient's PROM of flexion and scaption are WNL. Patient continues to be limited with tightness in PROM IR and ER. Patient will continue to benefit from skilled PT services to modify and progress therapeutic interventions, address functional mobility deficits, address ROM deficits, address strength deficits and analyze and address soft tissue restrictions to attain remaining goals. []  See Plan of Care  []  See progress note/recertification  []  See Discharge Summary         Progress towards goals / Updated goals:  Short Term Goals: To be accomplished in 3 weeks:  1. Patient will subjectively report full compliance with prescribed HEP. PN: Met, Pt reports performing HEP. 12/1/20  2. Patient will demonstrate right shoulder flexion and abduction PROM >/= 140 degrees to improve ease with dressing. PN: Met, Right Shoulder Flexion PROM 140 deg, Right Shoulder Abduction PROM 140 deg. 1/5/20; PROM Flex 160*, abd 120* 2/10/2021  3. Patient will demonstrate right shoulder IR (90 deg abduction) PROM >/= 40 degrees to improve ease with self-care ADLs. PN: Right Shoulder IR (90 deg abduction) PROM = 35 deg, 1/14/2021; PROM= 42* 2/10/2021     Long Term Goals: To be accomplished in 6 weeks:  1. Patient will demonstrate a significant functional improvement as demonstrated by a score of >/= 65 on FOTO. PN:   61 2/10/2021  2.  Patient will demonstrate right shoulder flexion and abduction AROM >/= 140 degrees to improve ease with overhead reach. PN: Flexion AROM  140* deg, Abduction AROM 115 deg, 2/10/2021  Current: Progressing:  AROM  142 deg, Abduction AROM 122 deg, 2/18/2021     3. Patient will demonstrate right shoulder flexion and abduction MMT >/= 4+/5 to improve ease with functional lifting. PN:  Met, Right Shoulder Flexion MMT 4+/5, Right Shoulder Abduction MMT 5/5, 1/12/2021     Updated Goals: Goals to be achieved in 4 weeks. 1. Patient will demonstrate right shoulder functional IR >/= L1 to improve ease with self-care ADLS. PN:  Functional IR = PSIS 2/10/2021:  2. Patient will demonstrate ability to perform right UE overhead reach with 3 lb handweight x10 repetitions without pain to improve ease with household ADLs. PN: NT 2/10/2021  Current: Goal met: Pt able to place 3# on shelf overhead with no pain or substitutions.   2/23/21       PLAN  []  Upgrade activities as tolerated     [x]  Continue plan of care  []  Update interventions per flow sheet       []  Discharge due to:_  []  Other:_      Merary Doyle PTA 2/23/2021  10:19 AM    Future Appointments   Date Time Provider Manoj Caruso   2/25/2021 11:00 AM Wen Myles [facility]   3/2/2021 10:15 AM Seth Damico PTA [facility]   3/4/2021 10:15 AM Dhiraj Rodriguez PT [facility]   3/9/2021  1:15 PM MONA Burton [facility] BS AMB

## 2021-02-25 ENCOUNTER — HOSPITAL ENCOUNTER (OUTPATIENT)
Dept: PHYSICAL THERAPY | Age: 56
Discharge: HOME | End: 2021-02-25
Attending: ORTHOPAEDIC SURGERY
Payer: COMMERCIAL

## 2021-02-25 PROCEDURE — 97110 THERAPEUTIC EXERCISES: CPT

## 2021-02-25 PROCEDURE — 97112 NEUROMUSCULAR REEDUCATION: CPT

## 2021-02-25 PROCEDURE — 97140 MANUAL THERAPY 1/> REGIONS: CPT

## 2021-02-25 NOTE — PROGRESS NOTES
PT DAILY TREATMENT NOTE     Patient Name: Bert End  Date:2021  : 1965  [x]  Patient  Verified  Payor: BLUE LISBETH / Plan: [id number] PPO / Product Type: PPO /    In time:1104  Out time:11:59  Total Treatment Time (min): 55  Visit #: 4 of 8    Medicare/BCBS Only   Total Timed Codes (min):  45 1:1 Treatment Time:  45       Treatment Area: Right shoulder pain [M25.511]    SUBJECTIVE  Pain Level (0-10 scale): 0  Any medication changes, allergies to medications, adverse drug reactions, diagnosis change, or new procedure performed?: [x] No    [] Yes (see summary sheet for update)  Subjective functional status/changes:   [] No changes reported  Patient reports he continues to have stiffness in his shoulder.     OBJECTIVE              Modality rationale: decrease inflammation and decrease pain to improve the patients ability to improve ease with sleep   Min Type Additional Details     10 [x]?????????  Ice     []?????????  heat  []?????????  Ice massage Position: Seated  Location: Right Shoulder, Post-tx        27 min Therapeutic Exercise:  [x]????????? See flow sheet : Emphasis placed on promotion of right shoulder PROM and maintenance of elbow/wrist/hand PROM/AROM and strength   Rationale: increase ROM and increase strength to improve the patients ability to improve post-surgical recovery.        10 min Neuromuscular Education:  [x]??????? See flow sheet : Emphasis placed on improving activation and recruitment of the scapulothoracic and glenohumeral musculature and improving scapulohumeral rhythm with UE elevation   Rationale: increase ROM and increase strength to improve the patients ability to improve post-surgical recovery.     8 min Manual Therapy:    Supine, Right Shoulder PROM - Flexion, Scaption, Abduction, ER/IR   Supine, right GHJ mobs- inferior/posterior grade 2-3   The manual therapy interventions were performed at a separate and distinct time from the therapeutic activities interventions. Rationale: decrease pain, increase ROM and increase tissue extensibility to improve ease with dressing. With   [] TE   [] TA   [] neuro   [] other: Patient Education: [x] Review HEP    [] Progressed/Changed HEP based on:   [] positioning   [] body mechanics   [] transfers   [] heat/ice application    [] other:      Other Objective/Functional Measures: Functional IR = PSIS     Pain Level (0-10 scale) post treatment: 0    ASSESSMENT/Changes in Function: Patient continue to be limited with functional IR reach, initiated IR stretch with towel to help improved mobility. Patient will continue to benefit from skilled PT services to modify and progress therapeutic interventions, address functional mobility deficits, address ROM deficits, address strength deficits and analyze and address soft tissue restrictions to attain remaining goals. []  See Plan of Care  []  See progress note/recertification  []  See Discharge Summary         Progress towards goals / Updated goals:  Short Term Goals: To be accomplished in 3 weeks:  1. Patient will subjectively report full compliance with prescribed HEP. PN: Met, Pt reports performing HEP. 12/1/20  2. Patient will demonstrate right shoulder flexion and abduction PROM >/= 140 degrees to improve ease with dressing. PN: Met, Right Shoulder Flexion PROM 140 deg, Right Shoulder Abduction PROM 140 deg. 1/5/20; PROM Flex 160*, abd 120* 2/10/2021  3. Patient will demonstrate right shoulder IR (90 deg abduction) PROM >/= 40 degrees to improve ease with self-care ADLs. PN: Right Shoulder IR (90 deg abduction) PROM = 35 deg, 1/14/2021; PROM= 42* 2/10/2021     Long Term Goals: To be accomplished in 6 weeks:  1. Patient will demonstrate a significant functional improvement as demonstrated by a score of >/= 65 on FOTO. PN:   61 2/10/2021  2.  Patient will demonstrate right shoulder flexion and abduction AROM >/= 140 degrees to improve ease with overhead reach.  PN: Flexion AROM  140* deg, Abduction AROM 115 deg, 2/10/2021  Current: Progressing:  AROM  142 deg, Abduction AROM 122 deg, 2/18/2021     3. Patient will demonstrate right shoulder flexion and abduction MMT >/= 4+/5 to improve ease with functional lifting. PN:  Met, Right Shoulder Flexion MMT 4+/5, Right Shoulder Abduction MMT 5/5, 1/12/2021     Updated Goals: Goals to be achieved in 4 weeks. 1. Patient will demonstrate right shoulder functional IR >/= L1 to improve ease with self-care ADLS. PN:  Functional IR = PSIS 2/10/2021:  Current; remains: Functional IR = PSIS 2/25/21  2. Patient will demonstrate ability to perform right UE overhead reach with 3 lb handweight x10 repetitions without pain to improve ease with household ADLs. PN: NT 2/10/2021  Current: Goal met: Pt able to place 3# on shelf overhead with no pain or substitutions.   2/23/21       PLAN  []  Upgrade activities as tolerated     [x]  Continue plan of care  []  Update interventions per flow sheet       []  Discharge due to:_  []  Other:_      Holden Vicente PTA 2/25/2021  11:09 AM    Future Appointments   Date Time Provider Manoj Caruso   3/2/2021 10:15 AM Ettie Carrel, PTA [facility]   3/4/2021 10:15 AM Annis Moritz, PT [facility]   3/9/2021  1:15 PM MONA Auguste [facility] BS AMB

## 2021-03-02 ENCOUNTER — HOSPITAL ENCOUNTER (OUTPATIENT)
Dept: PHYSICAL THERAPY | Age: 56
Discharge: HOME | End: 2021-03-02
Attending: ORTHOPAEDIC SURGERY
Payer: COMMERCIAL

## 2021-03-02 PROCEDURE — 97110 THERAPEUTIC EXERCISES: CPT

## 2021-03-02 PROCEDURE — 97140 MANUAL THERAPY 1/> REGIONS: CPT

## 2021-03-02 PROCEDURE — 97112 NEUROMUSCULAR REEDUCATION: CPT

## 2021-03-02 NOTE — PROGRESS NOTES
PT DAILY TREATMENT NOTE     Patient Name: Paddy Villafana  Date:3/2/2021  : 1965  [x]  Patient  Verified  Payor: BLUE CROSS / Plan: [facility] PPO / Product Type: PPO /    In time:1019  Out time:1115  Total Treatment Time (min): 56  Visit #: 5 of 8    Medicare/BCBS Only   Total Timed Codes (min):  46 1:1 Treatment Time:  41       Treatment Area: Right shoulder pain [M25.511]    SUBJECTIVE  Pain Level (0-10 scale): 0  Any medication changes, allergies to medications, adverse drug reactions, diagnosis change, or new procedure performed?: [x] No    [] Yes (see summary sheet for update)  Subjective functional status/changes:   [] No changes reported  Patient reports greatest functional limitation with reaching behind his back. OBJECTIVE    Modality rationale: decrease inflammation and decrease pain to improve the patients ability to improve ease with sleep   Min Type Additional Details     10 [x]??????  Ice     []??????  heat  []??????  Ice massage Position: Seated  Location: Right Shoulder, Post-tx        20 min Therapeutic Exercise:  [x]?????? See flow sheet : Emphasis placed on promotion of right shoulder PROM/AAROM and maintenance of elbow/wrist/hand PROM/AROM and strength   Rationale: increase ROM and increase strength to improve the patients ability to improve post-surgical recovery.      16 min Neuromuscular Education:  [x]?????? See flow sheet : Emphasis placed on improving activation and recruitment of the scapulothoracic and glenohumeral musculature and improving scapulohumeral rhythm with UE elevation   Rationale: increase ROM and increase strength to improve the patients ability to improve post-surgical recovery.      10 min Manual Therapy:    Supine, Right Shoulder Passive Physiological Grade II-III Mobilization - Flexion, Scaption, Abduction  Supine, Right GH Inferior Grade I-IV Mobilization (OPP, inc deg abd)  Supine, Right GH AP Grade I-IV Mobilization (OPP, 90/90 abd + IR))   The manual therapy interventions were performed at a separate and distinct time from the therapeutic activities interventions. Rationale: decrease pain, increase ROM and increase tissue extensibility to improve ease with dressing.                                                                      With   [] TE   [] TA   [] neuro   [] other: Patient Education: [x] Review HEP    [] Progressed/Changed HEP based on:   [] positioning   [] body mechanics   [] transfers   [] heat/ice application    [] other:      Other Objective/Functional Measures:   Functional IR = Right PSIS      Pain Level (0-10 scale) post treatment: 0    ASSESSMENT/Changes in Function: With right shoulder ROM loss most significant in the direction of internal rotation with abnormal glenohumeral inferior and posterior capsular mobility. Patient provided with updated HEP to address continued posterior glenohumeral hypomobility. Patient will continue to benefit from skilled PT services to modify and progress therapeutic interventions, address functional mobility deficits, address ROM deficits, address strength deficits, analyze and address soft tissue restrictions, analyze and cue movement patterns, analyze and modify body mechanics/ergonomics, assess and modify postural abnormalities and instruct in home and community integration to attain remaining goals. []  See Plan of Care  []  See progress note/recertification  []  See Discharge Summary         Progress towards goals / Updated goals:    Short Term Goals: To be accomplished in 3 weeks:  1. Patient will subjectively report full compliance with prescribed HEP. PN: Met, Pt reports performing HEP. 12/1/20  2. Patient will demonstrate right shoulder flexion and abduction PROM >/= 140 degrees to improve ease with dressing. PN: Met, Right Shoulder Flexion PROM 140 deg, Right Shoulder Abduction PROM 140 deg. 1/5/20; PROM Flex 160*, abd 120* 2/10/2021  3.  Patient will demonstrate right shoulder IR (90 deg abduction) PROM >/= 40 degrees to improve ease with self-care ADLs. PN: Right Shoulder IR (90 deg abduction) PROM = 35 deg, 1/14/2021; PROM= 42* 2/10/2021     Long Term Goals: To be accomplished in 6 weeks:  1. Patient will demonstrate a significant functional improvement as demonstrated by a score of >/= 65 on FOTO. PN:   61 2/10/2021  2. Patient will demonstrate right shoulder flexion and abduction AROM >/= 140 degrees to improve ease with overhead reach. PN: Flexion AROM  140* deg, Abduction AROM 115 deg, 2/10/2021  Current: Progressing:  AROM  142 deg, Abduction AROM 122 deg, 2/18/2021  3. Patient will demonstrate right shoulder flexion and abduction MMT >/= 4+/5 to improve ease with functional lifting. PN:  Met, Right Shoulder Flexion MMT 4+/5, Right Shoulder Abduction MMT 5/5, 1/12/2021     Updated Goals: Goals to be achieved in 4 weeks. 1. Patient will demonstrate right shoulder functional IR >/= L1 to improve ease with self-care ADLS. PN:  Functional IR = PSIS 2/10/2021:  Current: Remains, Functional IR = Right PSIS, 3/2/2021  2.  Patient will demonstrate ability to perform right UE overhead reach with 3 lb handweight x10 repetitions without pain to improve ease with household ADLs.   PN: NT 2/10/2021  Current: Goal met: Pt able to place 3# on shelf overhead with no pain or substitutions.  2/23/21       PLAN  []  Upgrade activities as tolerated     []  Continue plan of care  []  Update interventions per flow sheet       []  Discharge due to:_  []  Other:_      Suzanna Truong PT 3/2/2021  10:11 AM    Future Appointments   Date Time Provider Manoj Zuly   3/2/2021 10:15 AM Gold Salcedo0 N Romie Severino [facility]   3/4/2021 10:15 AM Melita Morrison PT [facility]   3/9/2021  1:15 PM MONA Rao [facility] BS AMB

## 2021-03-04 ENCOUNTER — HOSPITAL ENCOUNTER (OUTPATIENT)
Dept: PHYSICAL THERAPY | Age: 56
Discharge: HOME | End: 2021-03-04
Attending: ORTHOPAEDIC SURGERY
Payer: COMMERCIAL

## 2021-03-04 PROCEDURE — 97140 MANUAL THERAPY 1/> REGIONS: CPT | Performed by: PHYSICAL THERAPIST

## 2021-03-04 PROCEDURE — 97112 NEUROMUSCULAR REEDUCATION: CPT | Performed by: PHYSICAL THERAPIST

## 2021-03-04 PROCEDURE — 97110 THERAPEUTIC EXERCISES: CPT | Performed by: PHYSICAL THERAPIST

## 2021-03-04 NOTE — PROGRESS NOTES
PT DAILY TREATMENT NOTE 11    Patient Name: Dandy Mclain  Date:3/4/2021  : 1965  [x]  Patient  Verified  Payor: BLUE CROSS / Plan: [facility] PPO / Product Type: PPO /    In time:10:20  Out time:11:16  Total Treatment Time (min): 56  Visit #: 6 of 8    Medicare/BCBS Only   Total Timed Codes (min):  46 1:1 Treatment Time:  40       Treatment Area: Right shoulder pain [M25.511]    SUBJECTIVE  Pain Level (0-10 scale): 0  Any medication changes, allergies to medications, adverse drug reactions, diagnosis change, or new procedure performed?: [x] No    [] Yes (see summary sheet for update)  Subjective functional status/changes:   [] No changes reported  Patient CC is inability to reach behind his back. OBJECTIVE    Modality rationale: decrease pain to improve the patients ability to increase tolerance to activity.    Min Type Additional Details    [] Estim:  []Unatt       []IFC  []Premod                        []Other:  []w/ice   []w/heat  Position:  Location:    [] Estim: []Att    []TENS instruct  []NMES                    []Other:  []w/US   []w/ice   []w/heat  Position:  Location:    []  Traction: [] Cervical       []Lumbar                       [] Prone          []Supine                       []Intermittent   []Continuous Lbs:  [] before manual  [] after manual    []  Ultrasound: []Continuous   [] Pulsed                           []1MHz   []3MHz W/cm2:  Location:    []  Iontophoresis with dexamethasone         Location: [] Take home patch   [] In clinic   10 [x]  Ice     []  heat  []  Ice massage  []  Laser   []  Anodyne Position: sitting  Location:right shoulder    []  Laser with stim  []  Other:  Position:  Location:    []  Vasopneumatic Device Pressure:       [] lo [] med [] hi   Temperature: [] lo [] med [] hi   [] Skin assessment post-treatment:  []intact []redness- no adverse reaction    []redness  adverse reaction:     20 min Therapeutic Exercise:  [] See flow sheet :Emphasis on increasing AROM and UE strength. Rationale: increase ROM and increase strength to improve the patients ability to increase tolerance to functional activity. 16 min Neuromuscular Re-education:  []  See flow sheet :Emphasis placed on improving activation and recruitment of the scapulothoracic and glenohumeral musculature and improving scapulohumeral rhythm with UE elevation   Rationale: increase ROM, increase strength and improve coordination  to improve the patients ability to improve functional activity, lifting and reaching. 10 min Manual Therapy:  GH joint mobs in supine, grade III-IV ant and post glides. Prone, grade IV post and ant glides. The manual therapy interventions were performed at a separate and distinct time from the therapeutic activities interventions. Rationale: increase ROM and increase tissue extensibility to increase ease of motion to improve function. With   [] TE   [] TA   [] neuro   [] other: Patient Education: [x] Review HEP    [] Progressed/Changed HEP based on:   [] positioning   [] body mechanics   [] transfers   [] heat/ice application    [] other:      Other Objective/Functional Measures: PROM right shoulder IR 0-45 degrees. Pain Level (0-10 scale) post treatment: 0    ASSESSMENT/Changes in Function: Patient with minimal decrease in function due to tightness into IR. PROM into IR has improved. Patient will continue to benefit from skilled PT services to modify and progress therapeutic interventions, address functional mobility deficits, address ROM deficits, address strength deficits, analyze and address soft tissue restrictions, analyze and cue movement patterns, analyze and modify body mechanics/ergonomics, assess and modify postural abnormalities and instruct in home and community integration to attain remaining goals.      [x]  See Plan of Care  []  See progress note/recertification  []  See Discharge Summary         Progress towards goals / Updated goals:  Short Term Goals: To be accomplished in 3 weeks:  1. Patient will subjectively report full compliance with prescribed HEP. PN: Met, Pt reports performing HEP. 12/1/20  2. Patient will demonstrate right shoulder flexion and abduction PROM >/= 140 degrees to improve ease with dressing. PN: Met, Right Shoulder Flexion PROM 140 deg, Right Shoulder Abduction PROM 140 deg. 1/5/20; PROM Flex 160*, abd 120* 2/10/2021  3. Patient will demonstrate right shoulder IR (90 deg abduction) PROM >/= 40 degrees to improve ease with self-care ADLs. PN: Right Shoulder IR (90 deg abduction) PROM = 35 deg, 1/14/2021; PROM= 42* 2/10/2021  Current:  PROM right shoulder IR 0-45 degrees with pain and capsular pattern. 3/4/21. Goal Met.     Long Term Goals: To be accomplished in 6 weeks:  1. Patient will demonstrate a significant functional improvement as demonstrated by a score of >/= 65 on FOTO. PN:   61 2/10/2021  2. Patient will demonstrate right shoulder flexion and abduction AROM >/= 140 degrees to improve ease with overhead reach. PN: Flexion AROM  140* deg, Abduction AROM 115 deg, 2/10/2021  Current: Progressing:  AROM  142 deg, Abduction AROM 122 deg, 2/18/2021  3. Patient will demonstrate right shoulder flexion and abduction MMT >/= 4+/5 to improve ease with functional lifting. PN:  Met, Right Shoulder Flexion MMT 4+/5, Right Shoulder Abduction MMT 5/5, 1/12/2021     Updated Goals: Goals to be achieved in 4 weeks. 1. Patient will demonstrate right shoulder functional IR >/= L1 to improve ease with self-care ADLS. PN:  Functional IR = PSIS 2/10/2021:  Current: Remains, Functional IR = Right PSIS, 3/2/2021  2.  Patient will demonstrate ability to perform right UE overhead reach with 3 lb handweight x10 repetitions without pain to improve ease with household ADLs.   PN: NT 2/10/2021  Current: Goal met: Pt able to place 3# on shelf overhead with no pain or substitutions.  2/23/21     PLAN  [x]  Upgrade activities as tolerated     [x]  Continue plan of care  []  Update interventions per flow sheet       []  Discharge due to:_  []  Other:_      Ranjith Adams, PT 3/4/2021  10:23 AM    Future Appointments   Date Time Provider Manoj Caruso   3/9/2021 10:15 AM Eliza Washington PT [facility]   3/9/2021  1:15 PM Colen Eisenmenger, PA [facility] BS AMB

## 2021-03-09 ENCOUNTER — OFFICE VISIT (OUTPATIENT)
Dept: ORTHOPEDIC SURGERY | Age: 56
End: 2021-03-09
Payer: COMMERCIAL

## 2021-03-09 ENCOUNTER — HOSPITAL ENCOUNTER (OUTPATIENT)
Dept: PHYSICAL THERAPY | Age: 56
End: 2021-03-09
Attending: ORTHOPAEDIC SURGERY
Payer: COMMERCIAL

## 2021-03-09 VITALS
OXYGEN SATURATION: 97 % | TEMPERATURE: 97.2 F | WEIGHT: 223.8 LBS | SYSTOLIC BLOOD PRESSURE: 150 MMHG | HEIGHT: 71 IN | RESPIRATION RATE: 16 BRPM | BODY MASS INDEX: 31.33 KG/M2 | HEART RATE: 80 BPM | DIASTOLIC BLOOD PRESSURE: 87 MMHG

## 2021-03-09 DIAGNOSIS — M75.111 INCOMPLETE ROTATOR CUFF TEAR OR RUPTURE OF RIGHT SHOULDER, NOT SPECIFIED AS TRAUMATIC: Primary | ICD-10-CM

## 2021-03-09 DIAGNOSIS — Z98.890 STATUS POST ARTHROSCOPY OF RIGHT SHOULDER: ICD-10-CM

## 2021-03-09 DIAGNOSIS — S46.101A INJURY OF TENDON OF LONG HEAD OF RIGHT BICEPS, INITIAL ENCOUNTER: ICD-10-CM

## 2021-03-09 PROCEDURE — 99213 OFFICE O/P EST LOW 20 MIN: CPT | Performed by: ORTHOPAEDIC SURGERY

## 2021-03-09 NOTE — PROGRESS NOTES
Luigi Acuna  1965     HISTORY OF PRESENT ILLNESS  Liugi Acuna is a 55 y.o. male who presents today for evaluation s/p Right shoulder arthroscopic rotator cuff repair, biceps tenodesis and subacromial decompression on 11/3/20. Patient has been going to PT. Has been taking diclofenac for pain. He has been compliant with his exercises and restrictions. He has gone to PT and doing well. Patient denies any fever, chills, chest pain, shortness of breath or calf pain. There are no new illness or injuries to report since last seen in the office.    PHYSICAL EXAM:   Visit Vitals  BP (!) 150/87 (BP 1 Location: Left upper arm, BP Patient Position: Sitting, BP Cuff Size: Large adult)   Pulse 80   Temp 97.2 °F (36.2 °C) (Temporal)   Resp 16   Ht 5' 11\" (1.803 m)   Wt 223 lb 12.8 oz (101.5 kg)   SpO2 97%   BMI 31.21 kg/m²      The patient is a well-developed, well-nourished male in no acute distress.  The patient is alert and oriented times three. The patient appears to be well groomed. Mood and affect are normal.  ORTHOPEDIC EXAM of right shoulder:  Inspection: swelling not present,  Bruising not present  Incision well healed  Passive glenohumeral abduction 0-90 degrees, 180 PFF, 160 AFF, 60 PER  Strength: gentle rotator cuff testing with some weakness and no pain  2+ distal pulses        IMPRESSION:  s/p Right shoulder arthroscopic rotator cuff repair, biceps tenodesis and subacromial decompression    PLAN:   Pt doing well post operatively  His motion has improved since last visit. His strength and motion are on track  Will continue PT and exercises. Another order was placed today.  Stressed to patient that nothing causes an increase in pain.  Pt not given a refill of pain medication today.  He will come in 3 weeks from now for his left knee cortisone injection.  RTC 6 weeks      Meli Piper PA-C  [facility]

## 2021-03-10 ENCOUNTER — HOSPITAL ENCOUNTER (OUTPATIENT)
Dept: PHYSICAL THERAPY | Age: 56
Discharge: HOME | End: 2021-03-10
Attending: ORTHOPAEDIC SURGERY
Payer: COMMERCIAL

## 2021-03-10 PROCEDURE — 97112 NEUROMUSCULAR REEDUCATION: CPT

## 2021-03-10 PROCEDURE — 97110 THERAPEUTIC EXERCISES: CPT

## 2021-03-10 PROCEDURE — 97140 MANUAL THERAPY 1/> REGIONS: CPT

## 2021-03-10 NOTE — PROGRESS NOTES
PT DAILY TREATMENT NOTE     Patient Name: Prasad Roblero  Date:3/10/2021  : 1965  [x]  Patient  Verified  Payor: BLUE CROSS / Plan: [facility] PPO / Product Type: PPO /    In time:549  Out time:643  Total Treatment Time (min): 54  Visit #: 7 of 8    Medicare/BCBS Only   Total Timed Codes (min):  44 1:1 Treatment Time:  44       Treatment Area: Right shoulder pain [M25.511]    SUBJECTIVE  Pain Level (0-10 scale): 0  Any medication changes, allergies to medications, adverse drug reactions, diagnosis change, or new procedure performed?: [x] No    [] Yes (see summary sheet for update)  Subjective functional status/changes:   [] No changes reported  Patient reports follow up with surgical PA with recommended continuation of PT services with patient provided with updated script. OBJECTIVE    Modality rationale: decrease inflammation and decrease pain to improve the patients ability to improve ease with sleep   Min Type Additional Details   7 [x]  Ice     []  heat  []  Ice massage Position: Seated  Location: Right Shoulder, Post-Tx     20 min Therapeutic Exercise:  [x]??????? See flow sheet : Emphasis placed on promotion of right shoulder PROM/AAROM and maintenance of elbow/wrist/hand PROM/AROM and strength   Rationale: increase ROM and increase strength to improve the patients ability to improve post-surgical recovery.      16 min Neuromuscular Education:  [x]??????? See flow sheet : Emphasis placed on improving activation and recruitment of the scapulothoracic and glenohumeral musculature and improving scapulohumeral rhythm with UE elevation   Rationale: increase ROM and increase strength to improve the patients ability to improve post-surgical recovery.      8 min Manual Therapy:    Supine, Right Shoulder Passive Physiological Grade II-III Mobilization - Flexion, Scaption, Abduction  Supine, Right GH Inferior Grade I-IV Mobilization (OPP, inc deg abd)  Supine, Right GH AP Grade I-IV Mobilization (OPP, 90/90 abd + IR))   The manual therapy interventions were performed at a separate and distinct time from the therapeutic activities interventions. Rationale: decrease pain, increase ROM and increase tissue extensibility to improve ease with dressing. With   [] TE   [] TA   [] neuro   [] other: Patient Education: [x] Review HEP    [] Progressed/Changed HEP based on:   [] positioning   [] body mechanics   [] transfers   [] heat/ice application    [] other:      Other Objective/Functional Measures: See goals below. Pain Level (0-10 scale) post treatment: 0    ASSESSMENT/Changes in Function: Patient has demonstrated appropriate post-surgical progression but continues to objectively demonstrate loss of end-range shoulder AROM. Shoulder PROM WNL, with exception of internal rotation, with abnormal firm end-feel noted at end range and associated posterior glenohumeral capsular hypomobility. To continue at low frequency of 1x/week with plan to discharge to home exercise program upon completion. To emphasize promotion of return of end-range AROM and full shoulder strength to optimize patient ease with functional ADL performance. Patient will continue to benefit from skilled PT services to modify and progress therapeutic interventions, address functional mobility deficits, address ROM deficits, address strength deficits, analyze and address soft tissue restrictions, analyze and cue movement patterns, analyze and modify body mechanics/ergonomics and assess and modify postural abnormalities to attain remaining goals. []  See Plan of Care  [x]  See progress note/recertification  []  See Discharge Summary         Progress towards goals / Updated goals:    Short Term Goals: To be accomplished in 3 weeks:  1. Patient will subjectively report full compliance with prescribed HEP. Last PN: Met, Pt reports performing HEP. 12/1/20  2.  Patient will demonstrate right shoulder flexion and abduction PROM >/= 140 degrees to improve ease with dressing. Last PN: Met, Right Shoulder Flexion PROM 140 deg, Right Shoulder Abduction PROM 140 deg. 1/5/20; PROM Flex 160*, abd 120* 2/10/2021  3. Patient will demonstrate right shoulder IR (90 deg abduction) PROM >/= 40 degrees to improve ease with self-care ADLs. Last PN: Right Shoulder IR (90 deg abduction) PROM = 35 deg, 1/14/2021; PROM= 42* 2/10/2021  Current: Met, PROM right shoulder IR 0-45 degrees with pain and capsular pattern. 3/4/21     Long Term Goals: To be accomplished in 6 weeks:  1. Patient will demonstrate a significant functional improvement as demonstrated by a score of >/= 65 on FOTO. Last PN:   61 2/10/2021  Current: Met, FOTO = 67, 3/10/2021  2. Patient will demonstrate right shoulder flexion and abduction AROM >/= 140 degrees to improve ease with overhead reach. Last PN: Flexion AROM  140* deg, Abduction AROM 115 deg, 2/10/2021  Current: Progressing, Flexion AROM 135 deg, Abduction AROM 122 deg, 3/10/2021  3. Patient will demonstrate right shoulder flexion and abduction MMT >/= 4+/5 to improve ease with functional lifting. Last PN:  Met, Right Shoulder Flexion MMT 4+/5, Right Shoulder Abduction MMT 5/5, 1/12/2021     Updated Goals: Goals to be achieved in 4 weeks. 1. Patient will demonstrate right shoulder functional IR >/= L1 to improve ease with self-care ADLS. Last PN:  Functional IR = PSIS 2/10/2021:  Current: Progressing, Functional IR = Right SIJ, 3/10/2021  2. Patient will demonstrate ability to perform right UE overhead reach with 3 lb handweight x10 repetitions without pain to improve ease with household ADLs. Last PN: NT 2/10/2021  Current: Met, Pt able to place 3# on shelf overhead with no pain or substitutions.  2/23/21     Updated Goals: To be achieved in 4 weeks. 1. Patient will demonstrate right shoulder flexion MMT 5/5 to improve ease with return to recreational exercise.    3/10/2021: Right Shoulder Flexion MMT = 3+/5    PLAN  [x]  Upgrade activities as tolerated     [x]  Continue plan of care  []  Update interventions per flow sheet       []  Discharge due to:_  []  Other:_      Abbey Rosado, PT 3/10/2021  8:46 AM    Future Appointments   Date Time Provider Manoj Caruso   3/19/2021  2:00 PM Manning Gasmen, [place] [facility]   3/26/2021  2:00 PM Clarita Barton [facility]   3/31/2021 10:30 AM MONA Myers BS AMB   4/21/2021 10:30 AM MONA Myers BS AMB

## 2021-03-10 NOTE — PROGRESS NOTES
[facility]              [address]        [place], [address] [place]   [phone] fax    Progress Note  Patient name: Mami Banerjee Start of Care: 2020   Referral source: Halie Guajardo, [id number] : 1965   Medical/Treatment Diagnosis: Right shoulder pain [M25.511]  Payor: [facility] / Plan: [facility] PPO / Product Type: PPO /  Onset Date:  DoS 11/3/2020     Prior Hospitalization: see medical history Provider#: [id number]   Medications: Verified on Patient Summary List    Comorbidities: Arthritis, HTN, Right Elbow Surgery Rhoderick Waleska ), Lumbar Surgery x2, Right Knee Menisectomy (DoS ), HTN   Prior Level of Function: RHD, (I) Functional ADLs, Disabled (does not work), (I) Self-Care ADLs, (I) Driving  Visits from [facility]: 23    Missed Visits: 4    Established Goals:      Short Term Goals: To be accomplished in 3 weeks:  1. Patient will subjectively report full compliance with prescribed HEP. Last PN: Met, Pt reports performing HEP  2. Patient will demonstrate right shoulder flexion and abduction PROM >/= 140 degrees to improve ease with dressing. Last PN: Met, Right Shoulder Flexion PROM 140 deg, Right Shoulder Abduction PROM 140 deg. 20; PROM Flex 160*, abd 120*  3. Patient will demonstrate right shoulder IR (90 deg abduction) PROM >/= 40 degrees to improve ease with self-care ADLs. Last PN: Right Shoulder IR (90 deg abduction) PROM = 35 deg, 2021; PROM= 42*   Current: Met, PROM right shoulder IR 0-45 degrees with pain and capsular pattern     Long Term Goals: To be accomplished in 6 weeks:  1. Patient will demonstrate a significant functional improvement as demonstrated by a score of >/= 65 on FOTO. Last PN:  FOTO =  61  Current: Met, FOTO = 67  2. Patient will demonstrate right shoulder flexion and abduction AROM >/= 140 degrees to improve ease with overhead reach.   Last PN: Flexion AROM  140* deg, Abduction AROM 115 deg  Current: Progressing, Flexion AROM 135 deg, Abduction AROM 122 deg  3. Patient will demonstrate right shoulder flexion and abduction MMT >/= 4+/5 to improve ease with functional lifting. Last PN:  Met, Right Shoulder Flexion MMT 4+/5, Right Shoulder Abduction MMT 5/5     Updated Goals: Goals to be achieved in 4 weeks. 1. Patient will demonstrate right shoulder functional IR >/= L1 to improve ease with self-care ADLS. Last PN:  Functional IR = PSIS  Current: Progressing, Functional IR = Right SIJ  2. Patient will demonstrate ability to perform right UE overhead reach with 3 lb handweight x10 repetitions without pain to improve ease with household ADLs. Last PN: NT  Current: Met, Pt able to place 3# on shelf overhead with no pain or substitutions    Key Functional Changes: See goals above. Updated Goals: To be achieved in 4 weeks. 1. Patient will demonstrate right shoulder flexion MMT 5/5 to improve ease with return to recreational exercise. 3/10/2021: Right Shoulder Flexion MMT = 3+/5    ASSESSMENT/RECOMMENDATIONS:    Patient has demonstrated appropriate post-surgical progression but continues to objectively demonstrate loss of end-range shoulder AROM. Shoulder PROM WNL, with exception of internal rotation, with abnormal firm end-feel noted at end range and associated posterior glenohumeral capsular hypomobility. To continue at low frequency of 1x/week with plan to discharge to home exercise program upon completion.   To emphasize promotion of return of end-range AROM and full shoulder strength to optimize patient ease with functional ADL performance.      Patient will continue to benefit from skilled PT services to modify and progress therapeutic interventions, address functional mobility deficits, address ROM deficits, address strength deficits, analyze and address soft tissue restrictions, analyze and cue movement patterns, analyze and modify body mechanics/ergonomics and assess and modify postural abnormalities to attain remaining goals. [x]Continue therapy per initial plan/protocol at a frequency of  1 x per week for 4 weeks  []Continue therapy with the following recommended changes:_____________________      _____________________________________________________________________  []Discontinue therapy progressing towards or have reached established goals  []Discontinue therapy due to lack of appreciable progress towards goals  []Discontinue therapy due to lack of attendance or compliance  []Await Physician's recommendations/decisions regarding therapy  []Other:________________________________________________________________    Thank you for this referral.    Kaleb Ellis, PT 3/10/2021 6:16 PM  NOTE TO PHYSICIAN:  PLEASE COMPLETE THE ORDERS BELOW AND   FAX TO [facility]: [phone]  If you are unable to process this request in 24 hours please contact our office: [phone]    []  I have read the above report and request that my patient continue as recommended. []  I have read the above report and request that my patient continue therapy with the following changes/special instructions:________________________________________  []I have read the above report and request that my patient be discharged from therapy.     Physician's Signature:____________Date:_________TIME:________     Colen Eisenmenger, PA  ** Signature, Date and Time must be completed for valid certification **

## 2021-03-19 ENCOUNTER — HOSPITAL ENCOUNTER (OUTPATIENT)
Dept: PHYSICAL THERAPY | Age: 56
Discharge: HOME | End: 2021-03-19
Attending: ORTHOPAEDIC SURGERY
Payer: COMMERCIAL

## 2021-03-19 PROCEDURE — 97110 THERAPEUTIC EXERCISES: CPT

## 2021-03-19 PROCEDURE — 97112 NEUROMUSCULAR REEDUCATION: CPT

## 2021-03-19 PROCEDURE — 97140 MANUAL THERAPY 1/> REGIONS: CPT

## 2021-03-19 NOTE — PROGRESS NOTES
PT DAILY TREATMENT NOTE     Patient Name: Claudia Bradshaw  Date:3/19/2021  : 1965  [x]  Patient  Verified  Payor: BLUE CROSS / Plan: [facility] PPO / Product Type: PPO /    In time:2:04  Out time:2:59  Total Treatment Time (min): 55  Visit #: 1 of 4    Medicare/BCBS Only   Total Timed Codes (min):  45 1:1 Treatment Time:  45       Treatment Area: Right shoulder pain [M25.511]    SUBJECTIVE  Pain Level (0-10 scale): 0  Any medication changes, allergies to medications, adverse drug reactions, diagnosis change, or new procedure performed?: [x] No    [] Yes (see summary sheet for update)  Subjective functional status/changes:   [] No changes reported  Patient reports he continues to have limitations with reaching behind his back.      OBJECTIVE                Modality rationale: decrease inflammation and decrease pain to improve the patients ability to improve ease with sleep   Min Type Additional Details     10 [x]??????????  Ice     []??????????  heat  []??????????  Ice massage Position: Seated  Location: Right Shoulder, Post-tx        27 min Therapeutic Exercise:  [x]?????????? See flow sheet : Emphasis placed on promotion of right shoulder PROM and maintenance of elbow/wrist/hand PROM/AROM and strength   Rationale: increase ROM and increase strength to improve the patients ability to improve post-surgical recovery.        10 min Neuromuscular Education:  [x]???????? See flow sheet : Emphasis placed on improving activation and recruitment of the scapulothoracic and glenohumeral musculature and improving scapulohumeral rhythm with UE elevation   Rationale: increase ROM and increase strength to improve the patients ability to improve post-surgical recovery.     8 min Manual Therapy:    Supine, Right Shoulder PROM - Flexion, Scaption, Abduction, ER/IR   Supine, right GHJ mobs- inferior/posterior grade 2-3   The manual therapy interventions were performed at a separate and distinct time from the therapeutic activities interventions. Rationale: decrease pain, increase ROM and increase tissue extensibility to improve ease with dressing. With   [] TE   [] TA   [] neuro   [] other: Patient Education: [x] Review HEP    [] Progressed/Changed HEP based on:   [] positioning   [] body mechanics   [] transfers   [] heat/ice application    [] other:      Other Objective/Functional Measures: AROM flexion 150 deg, abduction 148 deg. Pain Level (0-10 scale) post treatment: 0    ASSESSMENT/Changes in Function: Patient is making progress with strength and mobility of right shoulder. Patient continues to be limited with functional IR reach. Patient will continue to benefit from skilled PT services to modify and progress therapeutic interventions, address functional mobility deficits, address ROM deficits, address strength deficits and analyze and address soft tissue restrictions to attain remaining goals. []  See Plan of Care  []  See progress note/recertification  []  See Discharge Summary         Progress towards goals / Updated goals:  Short Term Goals: To be accomplished in 3 weeks:  1. Patient will subjectively report full compliance with prescribed HEP. Last PN: Met, Pt reports performing HEP. 12/1/20  2. Patient will demonstrate right shoulder flexion and abduction PROM >/= 140 degrees to improve ease with dressing. Last PN: Met, Right Shoulder Flexion PROM 140 deg, Right Shoulder Abduction PROM 140 deg. 1/5/20; PROM Flex 160*, abd 120* 2/10/2021  3. Patient will demonstrate right shoulder IR (90 deg abduction) PROM >/= 40 degrees to improve ease with self-care ADLs. Last PN: Met, PROM right shoulder IR 0-45 degrees with pain and capsular pattern.  3/4/21     Long Term Goals: To be accomplished in 6 weeks:  1. Patient will demonstrate a significant functional improvement as demonstrated by a score of >/= 65 on FOTO. Last PN:    Met, FOTO = 67, 3/10/2021  2.  Patient will demonstrate right shoulder flexion and abduction AROM >/= 140 degrees to improve ease with overhead reach. Last PN: Flexion AROM 135 deg, Abduction AROM 122 deg, 3/10/2021  Current: Goal met: AROM flexion 150 deg, abduction 148 deg. 3/19/21  3. Patient will demonstrate right shoulder flexion and abduction MMT >/= 4+/5 to improve ease with functional lifting. Last PN:  Met, Right Shoulder Flexion MMT 4+/5, Right Shoulder Abduction MMT 5/5, 2021     Updated Goals: Goals to be achieved in 4 weeks. 1. Patient will demonstrate right shoulder functional IR >/= L1 to improve ease with self-care ADLS. Last PN: Functional IR = Right SIJ, 3/10/2021  2. Patient will demonstrate ability to perform right UE overhead reach with 3 lb handweight x10 repetitions without pain to improve ease with household ADLs. Last PN:  Met, Pt able to place 3# on shelf overhead with no pain or substitutions.  21     Updated Goals 3/10/2021: To be achieved in 4 weeks. 1. Patient will demonstrate right shoulder flexion MMT 5/5 to improve ease with return to recreational exercise.    PN:  Right Shoulder Flexion MMT = 3+/5    PLAN  []  Upgrade activities as tolerated     [x]  Continue plan of care  []  Update interventions per flow sheet       []  Discharge due to:_  []  Other:_      Yara Loss, PTA 3/19/2021  1:23 PM    Future Appointments   Date Time Provider Manoj Caruso   3/19/2021  2:00 PM [place], [place] [facility]   3/26/2021  2:00 PM Corinne Greenfield [facility]   3/31/2021 10:30 AM MONA Jesus BS AMB   2021 10:30 AM MONA Jesus BS AMB

## 2021-03-26 ENCOUNTER — HOSPITAL ENCOUNTER (OUTPATIENT)
Dept: PHYSICAL THERAPY | Age: 56
Discharge: HOME | End: 2021-03-26
Attending: ORTHOPAEDIC SURGERY
Payer: COMMERCIAL

## 2021-03-26 PROCEDURE — 97112 NEUROMUSCULAR REEDUCATION: CPT

## 2021-03-26 PROCEDURE — 97110 THERAPEUTIC EXERCISES: CPT

## 2021-03-26 NOTE — PROGRESS NOTES
PT DAILY TREATMENT NOTE     Patient Name: Pia Bowman  Date:3/26/2021  : 1965  [x]  Patient  Verified  Payor: BLUE CROSS / Plan: [facility] PPO / Product Type: PPO /    In time:2:05 PM  Out time:2:55 PM  Total Treatment Time (min): 50  Visit #: 2 of 4    Medicare/BCBS Only   Total Timed Codes (min):  40 1:1 Treatment Time:  40       Treatment Area: Right shoulder pain [M25.511]    SUBJECTIVE  Pain Level (0-10 scale): 0  Any medication changes, allergies to medications, adverse drug reactions, diagnosis change, or new procedure performed?: [x] No    [] Yes (see summary sheet for update)  Subjective functional status/changes:   [] No changes reported  \"It's just a little stiff. \"    OBJECTIVE    Modality rationale: decrease edema, decrease inflammation and decrease pain to improve the patients ability to ease post session soreness   Min Type Additional Details    [] Estim:  []Unatt       []IFC  []Premod                        []Other:  []w/ice   []w/heat  Position:  Location:    [] Estim: []Att    []TENS instruct  []NMES                    []Other:  []w/US   []w/ice   []w/heat  Position:  Location:    []  Traction: [] Cervical       []Lumbar                       [] Prone          []Supine                       []Intermittent   []Continuous Lbs:  [] before manual  [] after manual    []  Ultrasound: []Continuous   [] Pulsed                           []1MHz   []3MHz W/cm2:  Location:    []  Iontophoresis with dexamethasone         Location: [] Take home patch   [] In clinic   10 [x]  Ice     []  heat  []  Ice massage  []  Laser   []  Anodyne Position: reclined  Location: right shoulder    []  Laser with stim  []  Other:  Position:  Location:    []  Vasopneumatic Device Pressure:       [] lo [] med [] hi   Temperature: [] lo [] med [] hi   [] Skin assessment post-treatment:  []intact []redness- no adverse reaction    []redness  adverse reaction:       30 min Therapeutic Exercise:  [x] See flow sheet :   Rationale: increase ROM, increase strength and improve coordination to improve the patients ability to increase ease with ADLs       10 min Neuromuscular Education:  [x]????????? See flow sheet : Emphasis placed on improving activation and recruitment of the scapulothoracic and glenohumeral musculature and improving scapulohumeral rhythm with UE elevation   Rationale: increase ROM and increase strength to improve the patients ability to improve post-surgical recovery.             With   [] TE   [] TA   [] neuro   [] other: Patient Education: [x] Review HEP    [] Progressed/Changed HEP based on:   [] positioning   [] body mechanics   [] transfers   [] heat/ice application    [] other:      Other Objective/Functional Measures: Added 2# with wall slides//wall slides with lift off     -slight discomfort at left pec with elevated push ups secondary to reports of pt's dog pulling on left shoulder the other day. I modified height (to hip level); patient is able to perform with little difficulty. Pain Level (0-10 scale) post treatment: 0    ASSESSMENT/Changes in Function:   Patient's shoulder ROM and strength are WNL. Trial of holding manual today. Consider discharging in upcoming visits with updated HEP. Patient will continue to benefit from skilled PT services to modify and progress therapeutic interventions, address functional mobility deficits, address ROM deficits, address strength deficits, analyze and address soft tissue restrictions, analyze and cue movement patterns, analyze and modify body mechanics/ergonomics and assess and modify postural abnormalities to attain remaining goals. []  See Plan of Care  []  See progress note/recertification  []  See Discharge Summary         Updated Goals: Goals to be achieved in 4 weeks. 1. Patient will demonstrate right shoulder functional IR >/= L1 to improve ease with self-care ADLS.   Last PN:  Functional IR = PSIS  Current: Progressing, Functional IR = Right SIJ  2. Patient will demonstrate ability to perform right UE overhead reach with 3 lb handweight x10 repetitions without pain to improve ease with household ADLs.   Last PN: NT  Current: Met, Pt able to place 3# on shelf overhead with no pain or substitutions    PLAN  []  Upgrade activities as tolerated     [x]  Continue plan of care  []  Update interventions per flow sheet       []  Discharge due to:_  []  Other:_      Karma Rolle 3/26/2021  2:04 PM    Future Appointments   Date Time Provider Manoj Caruso   3/29/2021 10:15 AM Annabelle Wang PT [facility]   3/31/2021 10:30 AM MONA Rodriguez BS AMB   4/21/2021 10:30 AM MONA Rodriguez BS AMB

## 2021-03-26 NOTE — PROGRESS NOTES
Rach Patel  1965   Chief Complaint   Patient presents with    Knee Pain     left. requesting injection        HISTORY OF PRESENT ILLNESS  Rach Patel is a 63 y.o. male who presents today for reevaluation of left knee pain. He rates his pain 5/10 today. He reports lasting relief from the last cortisone injection and would like another one today. He wears a knee brace which does help. His pain is achy and located on the lateral and medial aspect of his knee. He also has some swelling and burning in that area. He has had an MRI in the past and was told he did not have any mensicus tears but arthritis in his knee. Patient denies any fever, chills, chest pain, shortness of breath or calf pain. There are no new illness or injuries to report since last seen in the office. No changes in medications, allergies, social or family history. PHYSICAL EXAM:   Visit Vitals  Pulse 90   Temp 97.2 °F (36.2 °C) (Skin)   Ht 5' 11\" (1.803 m)   Wt 222 lb (100.7 kg)   SpO2 98%   BMI 30.96 kg/m²     The patient is a well-developed, well-nourished male   in no acute distress. The patient is alert and oriented times three. Mood and affect are normal.  LYMPHATIC: lymph nodes are not enlarged and are within normal limits  SKIN: normal in color and non tender to palpation. There are no bruises or abrasions noted. NEUROLOGICAL: Motor sensory exam is within normal limits. Reflexes are equal bilaterally.  There is normal sensation to pinprick and light touch  MUSCULOSKELETAL:  Examination Left knee   Skin Intact   Range of motion 0-130   Effusion -   Medial joint line tenderness +   Lateral joint line tenderness +   Tenderness Pes Bursa -   Tenderness insertion MCL -   Tenderness insertion LCL -   Glens -   Patella crepitus -   Patella grind -   Lachman not assessed   Pivot shift not assessed   Anterior drawer not assessed   Posterior drawer not assessed   Varus stress -   Valgus stress -   Neurovascular Intact Calf Swelling and Tenderness to Palpation -   Maribel's Test -   Hamstring Cord Tightness -       PROCEDURE: After sterile prep, 3 cc of Xylocaine and 1 cc of Celestone were injected into the left intraarticular knee. [facility] - [id number]  OFFICE PROCEDURE PROGRESS NOTE        Chart reviewed for the following:  Kevin GAITAN PA, have reviewed the History, Physical and updated the Allergic reactions for [address] performed immediately prior to start of procedure:  Kevin GAITAN PA-C, have performed the following reviews on [address] prior to the start of the procedure:            * Patient was identified by name and date of birth   * Agreement on procedure being performed was verified  * Risks and Benefits explained to the patient  * Procedure site verified and marked as necessary  * Patient was positioned for comfort  * Consent was signed and verified     Time: 11:01 AM    Date of procedure: 3/31/2021    Procedure performed by:  MONA Portillo    Provider assisted by: (see medication administration)    How tolerated by patient: tolerated the procedure well with no complications    Comments: none      IMPRESSION:      ICD-10-CM ICD-9-CM    1. Primary osteoarthritis of left knee  M17.12 715.16 DRAIN/INJECT LARGE JOINT/BURSA      betamethasone (CELESTONE) injection 6 mg        PLAN:   Pt having left knee pain. I think this is coming from mild arthritis. Pt given a cortisone injection today. He tolerated it well  He will continue OTC medications and brace to help with pain and swelling. He will return for his right shoulder follow up in a couple weeks. Will do repeat xrays of his left knee at next visit. RTC PRN for his left knee for repeat cortisone injections if needed.         Goldie Portillo [facility]

## 2021-03-30 NOTE — PROGRESS NOTES
[facility]              [address]        [place], [address]   [phone] fax    Discharge Summary  Patient name: Tam Andrade Start of Care: 2020   Referral source: Giovany Ceron : 1965   Medical/Treatment Diagnosis: Right shoulder pain [M25.511]  Payor: [facility] / Plan: [facility] PPO / Product Type: PPO /  Onset Date:  DoS 11/3/2020     Prior Hospitalization: see medical history Provider#: [id number]   Medications: Verified on Patient Summary List    Comorbidities: Arthritis, HTN, Right Elbow Surgery Helder Wolff ), Lumbar Surgery x2, Right Knee Menisectomy (DoS ), HTN   Prior Level of Function: RHD, (I) Functional ADLs, Disabled (does not work), (I) Self-Care ADLs, (I) Driving  Visits from [place] of Care: 25    Missed Visits: 7  Reporting Period : 3/10/2021 to 3/26/2021    Summary of Care:    Updated Goals: Goals to be achieved in 4 weeks. 1. Patient will demonstrate right shoulder functional IR >/= L1 to improve ease with self-care ADLS. Last PN:  Functional IR = PSIS  Current: Progressing, Functional IR = Right SIJ  2. Patient will demonstrate ability to perform right UE overhead reach with 3 lb handweight x10 repetitions without pain to improve ease with household ADLs. Last PN: NT  Current: Met, Pt able to place 3# on shelf overhead with no pain or substitutions     ASSESSMENT/RECOMMENDATIONS:    At this time patient suitable for discharge with patient having no-showed last two scheduled appointments. Clinician in contact with patient 3/30/2021 with patient subjectively reporting agreement with formal discharge with patient subjectively reporting no need for continuation of skilled PT services with patient to continue with prescribed HEP independently for continued improvements in functional shoulder and UE AROM and strength post-discharge. Patient in agreement with plan of care.     [x]Discontinue therapy: [x]Patient has reached or is progressing toward set goals      []Patient is non-compliant or has abdicated      []Due to lack of appreciable progress towards set goals    Lia Mena, PT 3/30/2021 11:27 AM

## 2021-03-31 ENCOUNTER — OFFICE VISIT (OUTPATIENT)
Dept: ORTHOPEDIC SURGERY | Age: 56
End: 2021-03-31
Payer: COMMERCIAL

## 2021-03-31 VITALS
TEMPERATURE: 97.2 F | BODY MASS INDEX: 31.08 KG/M2 | WEIGHT: 222 LBS | HEART RATE: 90 BPM | OXYGEN SATURATION: 98 % | HEIGHT: 71 IN

## 2021-03-31 DIAGNOSIS — M17.12 PRIMARY OSTEOARTHRITIS OF LEFT KNEE: Primary | ICD-10-CM

## 2021-03-31 PROCEDURE — 99213 OFFICE O/P EST LOW 20 MIN: CPT | Performed by: ORTHOPAEDIC SURGERY

## 2021-03-31 PROCEDURE — 20610 DRAIN/INJ JOINT/BURSA W/O US: CPT | Performed by: ORTHOPAEDIC SURGERY

## 2021-03-31 RX ORDER — BETAMETHASONE SODIUM PHOSPHATE AND BETAMETHASONE ACETATE 3; 3 MG/ML; MG/ML
6 INJECTION, SUSPENSION INTRA-ARTICULAR; INTRALESIONAL; INTRAMUSCULAR; SOFT TISSUE ONCE
Status: COMPLETED | OUTPATIENT
Start: 2021-03-31 | End: 2021-03-31

## 2021-03-31 RX ADMIN — BETAMETHASONE SODIUM PHOSPHATE AND BETAMETHASONE ACETATE 6 MG: 3; 3 INJECTION, SUSPENSION INTRA-ARTICULAR; INTRALESIONAL; INTRAMUSCULAR; SOFT TISSUE at 11:16

## 2021-04-20 NOTE — PROGRESS NOTES
Oscar Cox  1965     HISTORY OF PRESENT ILLNESS  Oscar Cox is a 63 y.o. male who presents today for evaluation s/p Right shoulder arthroscopic rotator cuff repair, biceps tenodesis and subacromial decompression on 11/3/20. Patient has been going to PT and finished at this point. Has been taking diclofenac for pain. He has been compliant with his exercises. He is gradually increasing his activities listening to his shoulder. Patient denies any fever, chills, chest pain, shortness of breath or calf pain. There are no new illness or injuries to report since last seen in the office. PHYSICAL EXAM:   Visit Vitals  Pulse 83   Temp 97.8 °F (36.6 °C) (Skin)   Ht 5' 11\" (1.803 m)   Wt 219 lb (99.3 kg)   SpO2 98%   BMI 30.54 kg/m²      The patient is a well-developed, well-nourished male in no acute distress. The patient is alert and oriented times three. The patient appears to be well groomed. Mood and affect are normal.  ORTHOPEDIC EXAM of right shoulder:  Inspection: swelling not present,  Bruising not present  Incision well healed  Passive glenohumeral abduction 0-90 degrees, 180 PFF, 180 AFF, 60 PER  Strength: gentle rotator cuff testing with no weakness and no pain  2+ distal pulses        IMPRESSION:  s/p Right shoulder arthroscopic rotator cuff repair, biceps tenodesis and subacromial decompression    PLAN:   Pt doing well post operatively  His motion and strength look great today. He will continue exercises at home. He is done with PT and transitioned to a HEP. Stressed to patient that nothing causes an increase in pain. Pt not given a refill of pain medication today. He will come in 3 weeks from now for his left knee cortisone injection.   RTC 2 months if he is doing well will have him come back PRN      Olaf Geiger PA-C  [facility]

## 2021-04-21 ENCOUNTER — OFFICE VISIT (OUTPATIENT)
Dept: ORTHOPEDIC SURGERY | Age: 56
End: 2021-04-21
Payer: COMMERCIAL

## 2021-04-21 VITALS
HEIGHT: 71 IN | WEIGHT: 219 LBS | HEART RATE: 83 BPM | TEMPERATURE: 97.8 F | BODY MASS INDEX: 30.66 KG/M2 | OXYGEN SATURATION: 98 %

## 2021-04-21 DIAGNOSIS — Z98.890 STATUS POST ARTHROSCOPY OF RIGHT SHOULDER: ICD-10-CM

## 2021-04-21 DIAGNOSIS — M17.12 PRIMARY OSTEOARTHRITIS OF LEFT KNEE: ICD-10-CM

## 2021-04-21 DIAGNOSIS — S46.101A INJURY OF TENDON OF LONG HEAD OF RIGHT BICEPS, INITIAL ENCOUNTER: ICD-10-CM

## 2021-04-21 DIAGNOSIS — M75.111 INCOMPLETE ROTATOR CUFF TEAR OR RUPTURE OF RIGHT SHOULDER, NOT SPECIFIED AS TRAUMATIC: Primary | ICD-10-CM

## 2021-04-21 PROCEDURE — 99213 OFFICE O/P EST LOW 20 MIN: CPT | Performed by: ORTHOPAEDIC SURGERY

## 2021-06-23 ENCOUNTER — OFFICE VISIT (OUTPATIENT)
Dept: ORTHOPEDIC SURGERY | Age: 56
End: 2021-06-23
Payer: COMMERCIAL

## 2021-06-23 VITALS — OXYGEN SATURATION: 98 % | TEMPERATURE: 98.3 F | HEART RATE: 77 BPM | WEIGHT: 223.2 LBS | BODY MASS INDEX: 31.13 KG/M2

## 2021-06-23 DIAGNOSIS — S46.101D INJURY OF TENDON OF LONG HEAD OF RIGHT BICEPS, SUBSEQUENT ENCOUNTER: ICD-10-CM

## 2021-06-23 DIAGNOSIS — M75.111 INCOMPLETE ROTATOR CUFF TEAR OR RUPTURE OF RIGHT SHOULDER, NOT SPECIFIED AS TRAUMATIC: Primary | ICD-10-CM

## 2021-06-23 PROCEDURE — 99213 OFFICE O/P EST LOW 20 MIN: CPT | Performed by: ORTHOPAEDIC SURGERY

## 2021-06-23 RX ORDER — OXYCODONE HYDROCHLORIDE 10 MG/1
10 TABLET ORAL
COMMUNITY

## 2021-06-23 NOTE — PROGRESS NOTES
Patient: Cindy Tesfaye                MRN: [id number]       SSN: [id number]  YOB: 1965        AGE: 61 y.o. SEX: male  Body mass index is 31.13 kg/m². PCP: MONA May  06/23/21    Chief Complaint: Right shoulder follow up    HPI: Cindy Tesfaye is a 61 y.o. male patient who is now 6 months out from his right shoulder arthroscopic rotator cuff repair. Overall he is doing well. He complains of feeling of stiffness in the right shoulder at times. He also reports occasional crepitus in the shoulder. Past Medical History:   Diagnosis Date    Arthritis     Degenerative arthritis of right knee     Hypertension     Sleep apnea     no cpap       History reviewed. No pertinent family history. Current Outpatient Medications   Medication Sig Dispense Refill    oxyCODONE IR (ROXICODONE) 10 mg tab immediate release tablet Take 10 mg by mouth.  telmisartan (MICARDIS) 20 mg tablet Take 20 mg by mouth daily.  amLODIPine (NORVASC) 10 mg tablet Take  by mouth daily.  gabapentin (Neurontin) 300 mg capsule Take  by mouth as needed. Allergies   Allergen Reactions    Latex Rash    Tramadol Nausea Only       Past Surgical History:   Procedure Laterality Date    HX KNEE ARTHROSCOPY      HX LUMBAR DISKECTOMY      x 2    HX ORTHOPAEDIC      elbow ligament repair       Social History     Socioeconomic History    Marital status:      Spouse name: Not on file    Number of children: Not on file    Years of education: Not on file    Highest education level: Not on file   Occupational History    Not on file   Tobacco Use    Smoking status: Never Smoker    Smokeless tobacco: Never Used   Substance and Sexual Activity    Alcohol use:  Yes     Alcohol/week: 0.0 standard drinks     Comment: occaisional    Drug use: Never    Sexual activity: Not on file   Other Topics Concern    Not on file   Social History Narrative    Not on file     Social Determinants of Health     Financial Resource Strain:     Difficulty of Paying Living Expenses:    Food Insecurity:     Worried About Running Out of Food in the Last Year:     [address] in the Last Year:    Transportation Needs:     Lack of Transportation (Medical):  Lack of Transportation (Non-Medical):    Physical Activity:     Days of Exercise per Week:     Minutes of Exercise per Session:    Stress:     Feeling of Stress :    Social Connections:     Frequency of Communication with Friends and Family:     Frequency of Social Gatherings with Friends and Family:     Attends [employer] Services:     Active Member of Clubs or Organizations:     Attends Club or Organization Meetings:     Marital Status:    Intimate Partner Violence:     Fear of Current or Ex-Partner:     Emotionally Abused:     Physically Abused:     Sexually Abused:        REVIEW OF SYSTEMS:      No changes from previous review of systems unless noted. PHYSICAL EXAMINATION:  Visit Vitals  Pulse 77   Temp 98.3 °F (36.8 °C) (Temporal)   Wt 223 lb 3.2 oz (101.2 kg)   SpO2 98%   BMI 31.13 kg/m²     Body mass index is 31.13 kg/m². GENERAL: Alert and oriented x3, in no acute distress. HEENT: Normocephalic, atraumatic. RESP: Non labored breathing. SKIN: No rashes or lesions noted.    Shoulder Examination     R   L  ROM   FF  Full   Full  ER  Full   Full   IR  Full   Full  Rotator Cuff Pain   Supra  -   -   Infra  -   -   Subscap -   -  Crepitus  +   -  Effusion  -   -  Warmth  -   -   Erythema  -   -  Instability  -   -  AC Joint TTP  -   -  Clavicle   Deformity -   -   TTP  -   -  Proximal Humerus   Deformity -   -   TTP  -   -  Deltoid Strength 5   5  Biceps Strength 5   5  Biceps Deformity -   -  Biceps Groove Pain -   -  Impingement Sign -   -       IMAGING:  No new imaging today    ASSESSMENT & PLAN  Diagnosis: Right shoulder 6 months status post arthroscopic rotator cuff repair    I think Amelia Mancuso is doing well with regards to his right shoulder recovery. He does have a slight crepitus which possibly some scar tissue but I would watch this for now. It is not painful. His strength is good as well as his motion. At this point he can return to all activities as tolerated with his right shoulder. He says the pain is improved and he is happy with that. I will see him back as needed.       Electronically signed by: Fco Landeros MD

## 2021-10-25 RX ORDER — DICLOFENAC SODIUM 75 MG/1
TABLET, DELAYED RELEASE ORAL
Qty: 60 TABLET | Refills: 2 | Status: SHIPPED | OUTPATIENT
Start: 2021-10-25

## 2022-01-26 ENCOUNTER — OFFICE VISIT (OUTPATIENT)
Dept: ORTHOPEDIC SURGERY | Age: 57
End: 2022-01-26
Payer: COMMERCIAL

## 2022-01-26 VITALS — BODY MASS INDEX: 31.22 KG/M2 | WEIGHT: 223 LBS | RESPIRATION RATE: 16 BRPM | HEIGHT: 71 IN

## 2022-01-26 DIAGNOSIS — M17.12 PRIMARY OSTEOARTHRITIS OF LEFT KNEE: Primary | ICD-10-CM

## 2022-01-26 PROCEDURE — 99214 OFFICE O/P EST MOD 30 MIN: CPT | Performed by: ORTHOPAEDIC SURGERY

## 2022-01-26 PROCEDURE — 20610 DRAIN/INJ JOINT/BURSA W/O US: CPT | Performed by: ORTHOPAEDIC SURGERY

## 2022-01-26 RX ORDER — TRIAMCINOLONE ACETONIDE 40 MG/ML
40 INJECTION, SUSPENSION INTRA-ARTICULAR; INTRAMUSCULAR ONCE
Status: COMPLETED | OUTPATIENT
Start: 2022-01-26 | End: 2022-01-26

## 2022-01-26 RX ADMIN — TRIAMCINOLONE ACETONIDE 40 MG: 40 INJECTION, SUSPENSION INTRA-ARTICULAR; INTRAMUSCULAR at 14:32

## 2022-01-26 NOTE — PROGRESS NOTES
Patient: Aziza Sidhu                MRN: [id number]       SSN: [id number]  YOB: 1965        AGE: 61 y.o. SEX: male  Body mass index is 31.1 kg/m². PCP: MONA Gallegos  01/26/22    Chief Complaint: Left knee pain 710    HPI: Aziza Sidhu is a 61 y.o. male patient who returns to the office today for his left knee. He continues to have symptomatic left knee pain and swelling. He denies any recent injury or trauma to the knee. Past Medical History:   Diagnosis Date    Arthritis     Degenerative arthritis of right knee     Hypertension     Sleep apnea     no cpap       No family history on file. Current Outpatient Medications   Medication Sig Dispense Refill    diclofenac EC (VOLTAREN) 75 mg EC tablet TAKE 1 TABLET BY MOUTH TWICE A DAY WITH MEALS 60 Tablet 2    oxyCODONE IR (ROXICODONE) 10 mg tab immediate release tablet Take 10 mg by mouth.  telmisartan (MICARDIS) 20 mg tablet Take 20 mg by mouth daily.  amLODIPine (NORVASC) 10 mg tablet Take  by mouth daily.  gabapentin (Neurontin) 300 mg capsule Take  by mouth as needed. Allergies   Allergen Reactions    Latex Rash    Tramadol Nausea Only       Past Surgical History:   Procedure Laterality Date    HX KNEE ARTHROSCOPY      HX LUMBAR DISKECTOMY      x 2    HX ORTHOPAEDIC      elbow ligament repair       Social History     Socioeconomic History    Marital status:      Spouse name: Not on file    Number of children: Not on file    Years of education: Not on file    Highest education level: Not on file   Occupational History    Not on file   Tobacco Use    Smoking status: Never Smoker    Smokeless tobacco: Never Used   Substance and Sexual Activity    Alcohol use:  Yes     Alcohol/week: 0.0 standard drinks     Comment: occaisional    Drug use: Never    Sexual activity: Not on file   Other Topics Concern    Not on file   Social History Narrative    Not on file Social Determinants of Health     Financial Resource Strain:     Difficulty of Paying Living Expenses: Not on file   Food Insecurity:     Worried About Running Out of Food in the Last Year: Not on file    Harini of Food in the Last Year: Not on file   Transportation Needs:     Lack of Transportation (Medical): Not on file    Lack of Transportation (Non-Medical): Not on file   Physical Activity:     Days of Exercise per Week: Not on file    Minutes of Exercise per Session: Not on file   Stress:     Feeling of Stress : Not on file   Social Connections:     Frequency of Communication with Friends and Family: Not on file    Frequency of Social Gatherings with Friends and Family: Not on file    Attends Gnosticism Services: Not on file    Active Member of [address] [place] [address] or Organizations: Not on file    Attends Club or Organization Meetings: Not on file    Marital Status: Not on file   Intimate Partner Violence:     Fear of Current or Ex-Partner: Not on file    Emotionally Abused: Not on file    Physically Abused: Not on file    Sexually Abused: Not on file   Housing Stability:     Unable to Pay for Housing in the Last Year: Not on file    Number of [place] in the Last Year: Not on file    Unstable Housing in the Last Year: Not on file       REVIEW OF SYSTEMS:      No changes from previous review of systems unless noted. PHYSICAL EXAMINATION:  Visit Vitals  Resp 16   Ht 5' 11\" (1.803 m)   Wt 223 lb (101.2 kg) Comment: pt reports   BMI 31.10 kg/m²     Body mass index is 31.1 kg/m². GENERAL: Alert and oriented x3, in no acute distress. HEENT: Normocephalic, atraumatic. RESP: Non labored breathing. SKIN: No rashes or lesions noted.    Knee Examination      R   L  Effusion   -   -  Warmth   -   -  Erythema   -   -  ROM   Extension  Full   Full   Flexion   Full   Full  Tenderness   Medial Joint  -   +   Lateral Joint  -   +   Posterior knee  -   -  Strength   Quad   5   5   Hamstring  5   5  Crepitus   Tibiofemoral   -   -   Patellofemoral  -   +  Instability   Anterior  -   -   Posterior  -   -   Patellofemoral  -   -  Lachman's   -   -  Anterior Drawer  -   -  Posterior Drawer  -   -  Glen's   Pain   -   +   Locking  -   -  Calf TTP   -   -  Straight Leg Raise  -   -      IMAGING:  Previous x-rays of his left knee were reviewed from November. These show mild medial knee arthrosis. No fractures or other malalignment is noted. ASSESSMENT & PLAN  Diagnosis: Left knee osteoarthritis, concern for meniscal pathology    Lizeth Bal continues to have left knee symptomatic pain. We discussed the treatment options today at length. I recommended an MRI of the left knee which I ordered today to further evaluate the arthritis as well as the menisci in the left knee. He is currently in pain management. I recommend anti-inflammatories as needed for the pain. In order to try to give him some relief over the next week or so until he is able to get the MRI he opted for corticosteroid injection into the left knee. I will plan to see him back after the MRI is completed.     [facility]  OFFICE PROCEDURE PROGRESS NOTE        Chart reviewed for the following:   Namrata Anderson MD, have reviewed the History, Physical and updated the Allergic reactions for [address] performed immediately prior to start of procedure:   Namrata Anderson MD, have performed the following reviews on Susi Flor prior to the start of the procedure:            * Patient was identified by name and date of birth   * Agreement on procedure being performed was verified  * Risks and Benefits explained to the patient  * Procedure site verified and marked as necessary  * Patient was positioned for comfort  * Consent was signed and verified    Time: 2:28 PM    Location: Left knee joint intra-articular injection    Kenalog 40mg & 3cc Lidocaine    Date of procedure: 1/26/2022    Procedure performed by:  Xiang Gordon MD    Provider assisted by: Saroj Cordova LPN    Patient assisted by: self    How tolerated by patient: tolerated the procedure well with no complications    Post Procedural Pain Scale: 0 - No Hurt    Comments: none        Electronically signed by: Xiang Gordon MD    Note: This note was completed using voice recognition software.   Any typographical/name errors or mistakes are unintentional.

## 2022-02-07 ENCOUNTER — TELEPHONE (OUTPATIENT)
Dept: ORTHOPEDIC SURGERY | Age: 57
End: 2022-02-07

## 2022-02-07 NOTE — TELEPHONE ENCOUNTER
Monique Ruano from [employer] called in to leave peer to peer information for the patient MRI on 2/9/2022    Contact [phone]  Case number [id number]

## 2022-02-07 NOTE — DISCHARGE INSTRUCTIONS
DISCHARGE SUMMARY from Nurse    PATIENT INSTRUCTIONS:    After general anesthesia or intravenous sedation, for 24 hours or while taking prescription Narcotics:  · Limit your activities  · Do not drive and operate hazardous machinery  · Do not make important personal or business decisions  · Do  not drink alcoholic beverages  · If you have not urinated within 8 hours after discharge, please contact your surgeon on call. Report the following to your surgeon:  · Excessive pain, swelling, redness or odor of or around the surgical area  · Temperature over 100.5  · Nausea and vomiting lasting longer than 4 hours or if unable to take medications  · Any signs of decreased circulation or nerve impairment to extremity: change in color, persistent  numbness, tingling, coldness or increase pain  · Any questions    What to do at Home:  Recommended activity: Activity as tolerated and no driving for today. *  Please give a list of your current medications to your Primary Care Provider. *  Please update this list whenever your medications are discontinued, doses are      changed, or new medications (including over-the-counter products) are added. *  Please carry medication information at all times in case of emergency situations. These are general instructions for a healthy lifestyle:    No smoking/ No tobacco products/ Avoid exposure to second hand smoke  Surgeon General's Warning:  Quitting smoking now greatly reduces serious risk to your health. Obesity, smoking, and sedentary lifestyle greatly increases your risk for illness    A healthy diet, regular physical exercise & weight monitoring are important for maintaining a healthy lifestyle    You may be retaining fluid if you have a history of heart failure or if you experience any of the following symptoms:  Weight gain of 3 pounds or more overnight or 5 pounds in a week, increased swelling in our hands or feet or shortness of breath while lying flat in bed. warm/no numbness/no tingling/no discoloration Please call your doctor as soon as you notice any of these symptoms; do not wait until your next office visit. The discharge information has been reviewed with the patient. The patient verbalized understanding. Discharge medications reviewed with the patient and appropriate educational materials and side effects teaching were provided. ___________________________________________________________________________________________________________________________________      Patient Education   Rotator Cuff Repair: What to Expect at [facility] cuff repair surgery is done to fix a tear in the rotator cuff. It can also include cleaning the space between the rotator cuff tendons and the shoulder blade. This is called subacromial smoothing. You will feel tired for several days. Your shoulder will be swollen. And you may notice that your skin is a different color near the cut (incision). Your hand and arm may also be swollen. This is normal and will start to get better in a few days. It will be several months before you have complete use of your shoulder and arm. When you have healed from surgery, you will need to build your strength and the motion of your joint with rehabilitation (rehab) exercises. In time, your shoulder will likely be stronger, less painful, and more flexible than it was before the surgery. This care sheet gives you a general idea about how long it will take for you to recover. But each person recovers at a different pace. Follow the steps below to get better as quickly as possible. How can you care for yourself at home? Activity    · Rest when you feel tired. Getting enough sleep will help you recover. Do not lie flat or sleep on your side. Raise your upper body on two or three pillows, or sleep in a reclining chair.     · Try to walk each day. Start by walking a little more than you did the day before. Bit by bit, increase the amount you walk.  Walking boosts blood flow and helps prevent pneumonia and constipation.     · Your arm will be in a sling or other device to prevent it from moving for 4 to 8 weeks. ? Always use the sling when you walk or stand. ? If you sit or lie down, you can loosen the sling, but don't remove it. This lets your elbow straighten without moving the shoulder. You can also support your arm on a pillow. ? Remove the sling only to do prescribed exercises or to shower.     · You will not have complete use of your affected arm for 3 to 4 months after surgery. ? You can use your affected arm for writing, eating, or drinking, but move it only at the elbow or wrist. Do not use it for anything else except prescribed exercises until the sling has been removed. ? When the sling has been removed, you can do activities that don't involve lifting, pushing, pulling, or carrying. You may not be able to do overhead lifting for 6 to 12 months.     · If you have a desk job, you will probably be able to go back to work or your normal routine in 1 to 2 weeks. If you have a more active job, you may be away from work for 3 to 4 months or longer. If you work at a job that involves heavy manual labor, lifting your arms above your head, or the use of heavy tools, you may have to think about making changes to your job.     · If you had arthroscopic surgery, you can take a shower 48 to 72 hours after surgery. Remove the sling, and leave your arm by your side. To wash under your armpit, lean over and let the arm fall away from your body. Do not raise your arm. You may want to use a shower stool for a day or two.     · If you had open surgery, do not shower until you see your doctor and he or she okays it. You can wash the incisions with regular soap and water.     · Ask your doctor when you can drive again. This may take about 6 weeks or until you are no longer wearing the sling. Diet    · You can eat your normal diet.  If your stomach is upset, try bland, low-fat foods like plain rice, broiled chicken, toast, and yogurt. Drink plenty of fluids.     · You may notice that your bowel movements are not regular right after your surgery. This is common. Try to avoid constipation and straining with bowel movements. You may want to take a fiber supplement every day. If you have not had a bowel movement after a couple of days, ask your doctor about taking a mild laxative. Medicines    · Your doctor will tell you if and when you can restart your medicines. He or she will also give you instructions about taking any new medicines.     · If you take aspirin or some other blood thinner, ask your doctor if and when to start taking it again. Make sure that you understand exactly what your doctor wants you to do.     · Take pain medicines exactly as directed. ? If the doctor gave you a prescription medicine for pain, take it as prescribed. Use pain medicine when you first notice pain, before it becomes severe. It's easier to prevent pain early than to stop it after it gets bad.  ? If you are not taking a prescription pain medicine, ask your doctor if you can take an over-the-counter medicine.     · If your doctor prescribed antibiotics, take them as directed. Do not stop taking them just because you feel better. You need to take the full course of antibiotics.     · If you think your pain medicine is making you sick to your stomach:  ? Take your medicine after meals (unless your doctor has told you not to). ? Ask your doctor for a different pain medicine. Incision care    · If you had arthroscopic surgery, you may remove the bandage over your cut (incision) 24 to 48 hours after the surgery. Keep the bandage clean and dry.     · If you had open surgery, do not remove your bandage until you see your doctor and he or she okays it.  Keep the bandage clean and dry.     · If your incision is open to the air, keep the area clean and dry.     · If you have strips of tape on the incision, leave the tape on for a week or until it falls off. Exercise    · Shoulder rehabilitation is a series of exercises you do after your surgery. This helps you get back your shoulder's range of motion and strength. You will work with your doctor and physical therapist to plan this exercise program. Rehab may not start until 6 weeks after the surgery. To get the best results, you need to do the exercises correctly and as often and for as long as your doctor tells you. Ice    · To reduce swelling and pain, put ice or a cold pack on your shoulder for 10 to 20 minutes at a time. Do this every 1 to 2 hours. Put a thin cloth between the ice and your skin. Follow-up care is a key part of your treatment and safety. Be sure to make and go to all appointments, and call your doctor if you are having problems. It's also a good idea to know your test results and keep a list of the medicines you take. When should you call for help? Call 911 anytime you think you may need emergency care. For example, call if:    · You passed out (lost consciousness).     · You have severe trouble breathing.     · You have sudden chest pain and shortness of breath, or you cough up blood. Call your doctor now or seek immediate medical care if:    · You have numbness, tingling, or a bluish color in your fingers or hand.     · You have severe nausea or vomiting.     · You have pain that does not go away after you take pain medicine.     · You have loose stitches, or your incision comes open.     · Your incision bleeds through your first bandage or is still bleeding 3 days after your surgery.     · You have signs of infection, such as:  ? Increased pain, swelling, warmth, or redness. ? Red streaks leading from the incision. ? Pus draining from the incision. ? A fever. Watch closely for any changes in your health, and be sure to contact your doctor if:    · You do not have a bowel movement after taking a laxative. Where can you learn more?   Go to http://www.gray.com/  Enter Z874 in the search box to learn more about \"Rotator Cuff Repair: What to Expect at Home. \"  Current as of: March 2, 2020               Content Version: 12.6  © 7248-5077 [employer]. Care instructions adapted under license by [employer] (which disclaims liability or warranty for this information). If you have questions about a medical condition or this instruction, always ask your healthcare professional. [address] any warranty or liability for your use of this information.

## 2022-02-09 DIAGNOSIS — M17.12 PRIMARY OSTEOARTHRITIS OF LEFT KNEE: ICD-10-CM

## 2022-02-09 NOTE — TELEPHONE ENCOUNTER
Called and spoke to patient. Advised that the peer to peer was completed and an authorization was given. Phone call was transferred to central scheduling for patient to reschedule the MRI.

## 2022-02-09 NOTE — TELEPHONE ENCOUNTER
Patient went for MRI today and they informed him it was cancelled due to insurance not approving it. He is requesting a return call for advice on what he is to do from here.       Patient [phone]

## 2022-02-14 ENCOUNTER — HOSPITAL ENCOUNTER (OUTPATIENT)
Dept: MRI IMAGING | Age: 57
Discharge: HOME | End: 2022-02-14
Payer: COMMERCIAL

## 2022-02-14 PROCEDURE — 73721 MRI JNT OF LWR EXTRE W/O DYE: CPT

## 2022-02-23 ENCOUNTER — OFFICE VISIT (OUTPATIENT)
Dept: ORTHOPEDIC SURGERY | Age: 57
End: 2022-02-23
Payer: COMMERCIAL

## 2022-02-23 VITALS
TEMPERATURE: 97.8 F | OXYGEN SATURATION: 98 % | BODY MASS INDEX: 31.64 KG/M2 | HEART RATE: 91 BPM | WEIGHT: 226 LBS | HEIGHT: 71 IN

## 2022-02-23 DIAGNOSIS — M17.12 PRIMARY OSTEOARTHRITIS OF LEFT KNEE: Primary | ICD-10-CM

## 2022-02-23 DIAGNOSIS — S83.232A COMPLEX TEAR OF MEDIAL MENISCUS OF LEFT KNEE AS CURRENT INJURY, INITIAL ENCOUNTER: ICD-10-CM

## 2022-02-23 PROCEDURE — 99214 OFFICE O/P EST MOD 30 MIN: CPT | Performed by: ORTHOPAEDIC SURGERY

## 2022-02-23 NOTE — PROGRESS NOTES
Patient: Sabra Wang                MRN: [id number]       SSN: [id number]  YOB: 1965        AGE: 61 y.o. SEX: male  Body mass index is 31.52 kg/m². PCP: MONA Dewitt  02/23/22    Chief Complaint: Left knee follow up    Pain 8/10 average    HPI: Sabra Wang is a 61 y.o. male patient who returns to the office today for his left knee. He continues to have left knee pain especially when he walks. The pain is mostly medial in the knee. He has had his MRI done. Past Medical History:   Diagnosis Date    Arthritis     Degenerative arthritis of right knee     Hypertension     Sleep apnea     no cpap       History reviewed. No pertinent family history. Current Outpatient Medications   Medication Sig Dispense Refill    diclofenac EC (VOLTAREN) 75 mg EC tablet TAKE 1 TABLET BY MOUTH TWICE A DAY WITH MEALS 60 Tablet 2    oxyCODONE IR (ROXICODONE) 10 mg tab immediate release tablet Take 10 mg by mouth.  telmisartan (MICARDIS) 20 mg tablet Take 20 mg by mouth daily.  amLODIPine (NORVASC) 10 mg tablet Take  by mouth daily.  gabapentin (Neurontin) 300 mg capsule Take  by mouth as needed. Allergies   Allergen Reactions    Latex Rash    Tramadol Nausea Only       Past Surgical History:   Procedure Laterality Date    HX KNEE ARTHROSCOPY Right     HX LUMBAR DISKECTOMY      x 2    HX ORTHOPAEDIC      elbow ligament repair    [place] ANESTH,SURGERY OF SHOULDER Right        Social History     Socioeconomic History    Marital status:      Spouse name: Not on file    Number of children: Not on file    Years of education: Not on file    Highest education level: Not on file   Occupational History    Not on file   Tobacco Use    Smoking status: Never Smoker    Smokeless tobacco: Never Used   Substance and Sexual Activity    Alcohol use:  Yes     Alcohol/week: 0.0 standard drinks     Comment: occaisional    Drug use: Never    Sexual activity: Not on file   Other Topics Concern    Not on file   Social History Narrative    Not on file     Social Determinants of Health     Financial Resource Strain:     Difficulty of Paying Living Expenses: Not on file   Food Insecurity:     Worried About Running Out of Food in the Last Year: Not on file    Harini of Food in the Last Year: Not on file   Transportation Needs:     Lack of Transportation (Medical): Not on file    Lack of Transportation (Non-Medical): Not on file   Physical Activity:     Days of Exercise per Week: Not on file    Minutes of Exercise per Session: Not on file   Stress:     Feeling of Stress : Not on file   Social Connections:     Frequency of Communication with Friends and Family: Not on file    Frequency of Social Gatherings with Friends and Family: Not on file    Attends Mormon Services: Not on file    Active Member of [address] [place] [address] [employer] or Organizations: Not on file    Attends Club or Organization Meetings: Not on file    Marital Status: Not on file   Intimate Partner Violence:     Fear of Current or Ex-Partner: Not on file    Emotionally Abused: Not on file    Physically Abused: Not on file    Sexually Abused: Not on file   Housing Stability:     Unable to Pay for Housing in the Last Year: Not on file    Number of [place] in the Last Year: Not on file    Unstable Housing in the Last Year: Not on file       REVIEW OF SYSTEMS:      No changes from previous review of systems unless noted. PHYSICAL EXAMINATION:  Visit Vitals  Pulse 91   Temp 97.8 °F (36.6 °C) (Temporal)   Ht 5' 11\" (1.803 m)   Wt 226 lb (102.5 kg)   SpO2 98%   BMI 31.52 kg/m²     Body mass index is 31.52 kg/m². GENERAL: Alert and oriented x3, in no acute distress. HEENT: Normocephalic, atraumatic.     Knee Examination      R   L  Effusion   -   -  Warmth   -   -  Erythema   -   -  ROM   Extension  Full   Full   Flexion   Full   Full  Tenderness   Medial Joint  -   +   Lateral Joint  -   -   Posterior knee  -   -  Strength   Quad   5   5   Hamstring  5   5  Crepitus   Tibiofemoral   -   -   Patellofemoral  -   -  Instability   Anterior  -   -   Posterior  -   -   Patellofemoral  -   -  Lachman's   -   -  Anterior Drawer  -   -  Posterior Drawer  -   -  Glen's   Pain   -   +   Locking  -   -  Calf TTP   -   -  Straight Leg Raise  -   -      IMAGING:  MRI of the left knee was reviewed. My interpretation is MRI is mild medial knee arthrosis and a partial medial meniscus tear. ASSESSMENT & PLAN  Diagnosis: Left knee medial meniscus tear, mild medial knee arthrosis    Yelitza Valencia has continued knee pain and did not get much benefit from a corticosteroid injection at his last visit. His MRI shows a partial medial meniscus tear. We discussed the treatment options for this at length including operative and nonoperative management. He would like to move forward with surgery. The surgery be an arthroscopic partial medial meniscectomy done outpatient. His risk factors for surgery include underlying arthrosis as well as BMI above 30. He understands the risks of surgery and would like to move forward. Prescription medication management discussed with patient. Electronically signed by: Jonh Greenfiedl MD    Note: This note was completed using voice recognition software.   Any typographical/name errors or mistakes are unintentional.

## 2022-03-04 DIAGNOSIS — Z01.810 PREOP CARDIOVASCULAR EXAM: ICD-10-CM

## 2022-03-04 DIAGNOSIS — S83.232A COMPLEX TEAR OF MEDIAL MENISCUS OF LEFT KNEE AS CURRENT INJURY, INITIAL ENCOUNTER: ICD-10-CM

## 2022-03-04 DIAGNOSIS — S83.232A COMPLEX TEAR OF MEDIAL MENISCUS OF LEFT KNEE AS CURRENT INJURY, INITIAL ENCOUNTER: Primary | ICD-10-CM

## 2022-03-04 DIAGNOSIS — Z01.812 PRE-OPERATIVE LABORATORY EXAMINATION: ICD-10-CM

## 2022-04-08 ENCOUNTER — OFFICE VISIT (OUTPATIENT)
Dept: ORTHOPEDIC SURGERY | Age: 57
End: 2022-04-08

## 2022-04-08 ENCOUNTER — HOSPITAL ENCOUNTER (OUTPATIENT)
Dept: LAB | Age: 57
Discharge: HOME | End: 2022-04-08
Payer: COMMERCIAL

## 2022-04-08 VITALS
OXYGEN SATURATION: 97 % | HEART RATE: 86 BPM | SYSTOLIC BLOOD PRESSURE: 146 MMHG | DIASTOLIC BLOOD PRESSURE: 85 MMHG | TEMPERATURE: 98.2 F | HEIGHT: 71 IN | WEIGHT: 230 LBS | BODY MASS INDEX: 32.2 KG/M2

## 2022-04-08 DIAGNOSIS — Z01.812 PRE-OPERATIVE LABORATORY EXAMINATION: ICD-10-CM

## 2022-04-08 DIAGNOSIS — S83.232A COMPLEX TEAR OF MEDIAL MENISCUS OF LEFT KNEE AS CURRENT INJURY, INITIAL ENCOUNTER: ICD-10-CM

## 2022-04-08 DIAGNOSIS — G89.18 POST-OP PAIN: ICD-10-CM

## 2022-04-08 DIAGNOSIS — Z01.818 PRE-OP EXAM: ICD-10-CM

## 2022-04-08 DIAGNOSIS — M17.12 PRIMARY OSTEOARTHRITIS OF LEFT KNEE: Primary | ICD-10-CM

## 2022-04-08 LAB
ALBUMIN SERPL-MCNC: 4.1 G/DL (ref 3.4–5)
ALBUMIN/GLOB SERPL: 1 {RATIO} (ref 0.8–1.7)
ALP SERPL-CCNC: 74 U/L (ref 45–117)
ALT SERPL-CCNC: 32 U/L (ref 16–61)
ANION GAP SERPL CALC-SCNC: 7 MMOL/L (ref 3–18)
AST SERPL-CCNC: 19 U/L (ref 10–38)
BASOPHILS # BLD: 0.1 K/UL (ref 0–0.1)
BASOPHILS NFR BLD: 1 % (ref 0–2)
BILIRUB SERPL-MCNC: 0.4 MG/DL (ref 0.2–1)
BUN SERPL-MCNC: 17 MG/DL (ref 7–18)
BUN/CREAT SERPL: 12 (ref 12–20)
CALCIUM SERPL-MCNC: 9.2 MG/DL (ref 8.5–10.1)
CHLORIDE SERPL-SCNC: 104 MMOL/L (ref 100–111)
CO2 SERPL-SCNC: 26 MMOL/L (ref 21–32)
CREAT SERPL-MCNC: 1.45 MG/DL (ref 0.6–1.3)
DIFFERENTIAL METHOD BLD: ABNORMAL
EOSINOPHIL # BLD: 0.1 K/UL (ref 0–0.4)
EOSINOPHIL NFR BLD: 1 % (ref 0–5)
ERYTHROCYTE [DISTWIDTH] IN BLOOD BY AUTOMATED COUNT: 14.7 % (ref 11.6–14.5)
GLOBULIN SER CALC-MCNC: 4 G/DL (ref 2–4)
GLUCOSE SERPL-MCNC: 102 MG/DL (ref 74–99)
HCT VFR BLD AUTO: 37.3 % (ref 36–48)
HGB BLD-MCNC: 12.8 G/DL (ref 13–16)
IMM GRANULOCYTES # BLD AUTO: 0 K/UL (ref 0–0.04)
IMM GRANULOCYTES NFR BLD AUTO: 0 % (ref 0–0.5)
LYMPHOCYTES # BLD: 2.9 K/UL (ref 0.9–3.6)
LYMPHOCYTES NFR BLD: 32 % (ref 21–52)
MCH RBC QN AUTO: 26.5 PG (ref 24–34)
MCHC RBC AUTO-ENTMCNC: 34.3 G/DL (ref 31–37)
MCV RBC AUTO: 77.2 FL (ref 78–100)
MONOCYTES # BLD: 0.7 K/UL (ref 0.05–1.2)
MONOCYTES NFR BLD: 7 % (ref 3–10)
NEUTS SEG # BLD: 5.4 K/UL (ref 1.8–8)
NEUTS SEG NFR BLD: 59 % (ref 40–73)
NRBC # BLD: 0 K/UL (ref 0–0.01)
NRBC BLD-RTO: 0 PER 100 WBC
PLATELET # BLD AUTO: 321 K/UL (ref 135–420)
PMV BLD AUTO: 10.3 FL (ref 9.2–11.8)
POTASSIUM SERPL-SCNC: 4.1 MMOL/L (ref 3.5–5.5)
PROT SERPL-MCNC: 8.1 G/DL (ref 6.4–8.2)
RBC # BLD AUTO: 4.83 M/UL (ref 4.35–5.65)
SODIUM SERPL-SCNC: 137 MMOL/L (ref 136–145)
WBC # BLD AUTO: 9.2 K/UL (ref 4.6–13.2)

## 2022-04-08 PROCEDURE — 80053 COMPREHEN METABOLIC PANEL: CPT

## 2022-04-08 PROCEDURE — 36415 COLL VENOUS BLD VENIPUNCTURE: CPT

## 2022-04-08 PROCEDURE — 85025 COMPLETE CBC W/AUTO DIFF WBC: CPT

## 2022-04-08 RX ORDER — HYDROCODONE BITARTRATE AND ACETAMINOPHEN 7.5; 325 MG/1; MG/1
1 TABLET ORAL EVERY 4 HOURS
Qty: 60 TABLET | Refills: 0 | Status: SHIPPED | OUTPATIENT
Start: 2022-04-08 | End: 2022-04-19

## 2022-04-08 NOTE — PROGRESS NOTES
History and Physical Performed today and documented in chart    Giovany Lorenzana  4/8/2022.  10:40 AM

## 2022-04-08 NOTE — H&P
HISTORY AND PHYSICAL          Patient: Levi Crane                MRN: [id number]       SSN: [id number]  YOB: 1965          AGE: 61 y.o. SEX: male      Patient scheduled for:  LEFT KNEE ARTHROSCOPIC PARTIAL MEDIAL MENISECTOMY    Surgeon: Ezra Alejandre MD    ANESTHESIA TYPE:  General    HISTORY:     The patient was seen in the office today for a preoperative history and physical for an upcoming above listed surgery. The patient is a pleasant 61 y.o. male who has a history of left knee. He continues to have symptomatic left knee pain and swelling. He denies any recent injury or trauma to the knee. Due to the current findings, affected activity of daily living and continued pain and discomfort, surgical intervention is indicated. The alternatives, risks, and complications, including but not limited to infection, blood loss, need for blood transfusion, neurovascular damage, ezio-incisional numbness, subcutaneous hematoma, bone fracture, anesthetic complications, DVT, PE, death, RSD, postoperative stiffness and pain, possible surgical scar, delayed healing and nonhealing, reflexive sympathetic dystrophy, damage to blood vessels and nerves, need for more surgery, MI, and stroke,  failure of hardware, gait disturbances,have been discussed. The patient understands and wishes to proceed with surgery. PAST MEDICAL HISTORY:     Past Medical History:   Diagnosis Date    Arthritis     Degenerative arthritis of right knee     Hypertension     Sleep apnea     no cpap       CURRENT MEDICATIONS:     Current Outpatient Medications   Medication Sig Dispense Refill    HYDROcodone-acetaminophen (Norco) 7.5-325 mg per tablet Take 1 Tablet by mouth every four (4) hours for 10 days. Max Daily Amount: 6 Tablets.  DO NOT TAKE until AFTER surgery, for post op pain only 60 Tablet 0    diclofenac EC (VOLTAREN) 75 mg EC tablet TAKE 1 TABLET BY MOUTH TWICE A DAY WITH MEALS (Patient taking differently: as needed.) 60 Tablet 2    oxyCODONE IR (ROXICODONE) 10 mg tab immediate release tablet Take 10 mg by mouth every six (6) hours as needed.  telmisartan (MICARDIS) 20 mg tablet Take 20 mg by mouth daily.  amLODIPine (NORVASC) 10 mg tablet Take  by mouth daily.  gabapentin (Neurontin) 300 mg capsule Take  by mouth as needed. ALLERGIES:     Allergies   Allergen Reactions    Latex Rash    Tramadol Nausea Only         SURGICAL HISTORY:     Past Surgical History:   Procedure Laterality Date    HX KNEE ARTHROSCOPY Right     HX LUMBAR DISKECTOMY      x 2    HX ORTHOPAEDIC      elbow ligament repair    HX ROTATOR CUFF REPAIR Right     [place] ANESTH,SURGERY OF SHOULDER Right        SOCIAL HISTORY:     Social History     Socioeconomic History    Marital status:    Tobacco Use    Smoking status: Never Smoker    Smokeless tobacco: Never Used   Vaping Use    Vaping Use: Never used   Substance and Sexual Activity    Alcohol use: Yes     Alcohol/week: 0.0 standard drinks     Comment: occaisional    Drug use: Never       FAMILY HISTORY:     No family history on file. REVIEW OF SYSTEMS:     Negative for fevers, chills, chest pain, shortness of breath, weight loss, recent illness     General: Negative for fever and chills. No unexpected change in weight. Denies fatigue. No change in appetite. Skin: Negative for rash or itching. HEENT: Negative for congestion, sore throat, neck pain and neck stiffness. No change in vision or hearing. Hasn't noted any enlarged lymph nodes in the neck. Cardiovascular:  Negative for chest pain and palpitations. Has not noted pedal edema. Respiratory: Negative for cough, colds, sinus, hemoptysis, shortness of breath and wheezing. Gastrointestinal: Negative for nausea and vomiting, rectal bleeding, coffee ground emesis, abdominal pain, diarrhea and constipation.    Genitourinary: Negative for dysuria, frequency urgency, or burning on micturition. No flank pain, no foul smelling urine, no difficulty with initiating urination. Hematological: Negative for bleeding or easy bruising. Musculoskeletal: Negative  for arthralgias, back pain or neck pain. Neurological: Negative for dizziness, seizures or syncopal episodes. Denies headaches. Endocrine: Denies excessive thirst.  No heat/cold intolerance. Psychiatric: Negative for depression or insomnia. PHYSICAL EXAMINATION:     VITALS:   Visit Vitals  BP (!) 146/85   Pulse 86   Temp 98.2 °F (36.8 °C)   Ht 5' 11\" (1.803 m)   Wt 230 lb (104.3 kg)   SpO2 97%   BMI 32.08 kg/m²     GEN:  Well developed, well nourished 61 y.o. male in no acute distress. HEENT: Normocephalic and atraumatic. Eyes: Conjunctivae and EOM are normal.Pupils are equal, round, and reactive to light. External ear normal appearance, external nose normal appearing. Mouth/Throat: Oropharynx is clear and moist, able to handle oral secretions w/out difficulty, airway patent  NECK: Supple. Normal ROM, No lymphadenopathy. Trachea is midline. No bruising, swelling or deformity  RESP: Clear to auscultation bilaterally. No wheezes, rales, rhonchi. Normal effort and breath sounds. No respiratory distress  CARDIO: Normal rate, regular rhythm and normal heart sounds. No MGR. ABDOMEN: Soft, non-tender, non-distended, normoactive bowel sounds in all four quadrants. There is no tenderness. There is no rebound and no guarding.    BACK: No CVA or spinal tenderness  BREAST:  Deferred  PELVIC:    Deferred   RECTAL:  Deferred   :           Deferred  EXTREMITIES: EXAMINATION OF: left knee  Knee Examination                                                  R                                  L  Effusion                                   -                                   -  Warmth                                   -                                   -  Erythema                                 -                                   -  ROM Extension                    Full                              Full              Flexion                         Full                              Full  Tenderness              Medial Joint                 -                                   +              Lateral Joint                -                                   +              Posterior knee             -                                   -  Strength              Quad                           5                                  5              Hamstring                    5                                  5  Crepitus              Tibiofemoral                -                                   -              Patellofemoral             -                                   +  Instability              Anterior                       -                                   -              Posterior                      -                                   -              Patellofemoral             -                                   -  Lachman's                               -                                   -  Anterior Drawer                       -                                   -  Posterior Drawer                     -                                   -  Glen's              Pain                             -                                   +              Locking                        -                                   -  Calf TTP                                  -                                   -  Straight Leg Raise                  -                                   -     NEUROVASCULAR: Sensation intact to light touch and strength grossly intact and symmetrical. No nystagmus. Positive distal pulses and capillary refill. DVT ASSESSMENT:  There is not  calf tenderness. No evidence of DVT seen on physical exam.  MOTOR: In tact  PSYCH: Alert an oriented to person, place and time.  Mood, memory, affect, behavior and judgment normal       RADIOGRAPHS & DIAGNOSTIC STUDIES:     MRI/xray reveals :     Previous x-rays of his left knee were reviewed from November. These show mild medial knee arthrosis. No fractures or other malalignment is noted.     MRI of the left knee was reviewed. My interpretation is MRI is mild medial knee arthrosis and a partial medial meniscus tear. LABS:       Labs and EKG pending      ASSESSMENT:       Encounter Diagnoses   Name Primary?  Primary osteoarthritis of left knee Yes    Complex tear of medial meniscus of left knee as current injury, initial encounter     Pre-op exam     Post-op pain        PLAN:     Again, the alternatives, risks, and complications, as well as expected outcome were discussed. The patient understands and agrees to proceed with LEFT KNEE ARTHROSCOPIC PARTIAL MEDIAL MENISECTOMY.  Patient given orders listed below:    Orders Placed This Encounter    HYDROcodone-acetaminophen (Norco) 7.5-325 mg per tablet         Sarkis Jacome PA-C  4/8/2022  10:41 AM

## 2022-04-08 NOTE — H&P (VIEW-ONLY)
HISTORY AND PHYSICAL          Patient: Francisco Mason                MRN: [id number]       SSN: [id number]  YOB: 1965          AGE: 61 y.o. SEX: male      Patient scheduled for:  LEFT KNEE ARTHROSCOPIC PARTIAL MEDIAL MENISECTOMY    Surgeon: Alberta Castillo MD    ANESTHESIA TYPE:  General    HISTORY:     The patient was seen in the office today for a preoperative history and physical for an upcoming above listed surgery. The patient is a pleasant 61 y.o. male who has a history of left knee. He continues to have symptomatic left knee pain and swelling. He denies any recent injury or trauma to the knee. Due to the current findings, affected activity of daily living and continued pain and discomfort, surgical intervention is indicated. The alternatives, risks, and complications, including but not limited to infection, blood loss, need for blood transfusion, neurovascular damage, ezio-incisional numbness, subcutaneous hematoma, bone fracture, anesthetic complications, DVT, PE, death, RSD, postoperative stiffness and pain, possible surgical scar, delayed healing and nonhealing, reflexive sympathetic dystrophy, damage to blood vessels and nerves, need for more surgery, MI, and stroke,  failure of hardware, gait disturbances,have been discussed. The patient understands and wishes to proceed with surgery. PAST MEDICAL HISTORY:     Past Medical History:   Diagnosis Date    Arthritis     Degenerative arthritis of right knee     Hypertension     Sleep apnea     no cpap       CURRENT MEDICATIONS:     Current Outpatient Medications   Medication Sig Dispense Refill    HYDROcodone-acetaminophen (Norco) 7.5-325 mg per tablet Take 1 Tablet by mouth every four (4) hours for 10 days. Max Daily Amount: 6 Tablets.  DO NOT TAKE until AFTER surgery, for post op pain only 60 Tablet 0    diclofenac EC (VOLTAREN) 75 mg EC tablet TAKE 1 TABLET BY MOUTH TWICE A DAY WITH MEALS (Patient taking differently: as needed.) 60 Tablet 2    oxyCODONE IR (ROXICODONE) 10 mg tab immediate release tablet Take 10 mg by mouth every six (6) hours as needed.  telmisartan (MICARDIS) 20 mg tablet Take 20 mg by mouth daily.  amLODIPine (NORVASC) 10 mg tablet Take  by mouth daily.  gabapentin (Neurontin) 300 mg capsule Take  by mouth as needed. ALLERGIES:     Allergies   Allergen Reactions    Latex Rash    Tramadol Nausea Only         SURGICAL HISTORY:     Past Surgical History:   Procedure Laterality Date    HX KNEE ARTHROSCOPY Right     HX LUMBAR DISKECTOMY      x 2    HX ORTHOPAEDIC      elbow ligament repair    HX ROTATOR CUFF REPAIR Right     [place] ANESTH,SURGERY OF SHOULDER Right        SOCIAL HISTORY:     Social History     Socioeconomic History    Marital status:    Tobacco Use    Smoking status: Never Smoker    Smokeless tobacco: Never Used   Vaping Use    Vaping Use: Never used   Substance and Sexual Activity    Alcohol use: Yes     Alcohol/week: 0.0 standard drinks     Comment: occaisional    Drug use: Never       FAMILY HISTORY:     No family history on file. REVIEW OF SYSTEMS:     Negative for fevers, chills, chest pain, shortness of breath, weight loss, recent illness     General: Negative for fever and chills. No unexpected change in weight. Denies fatigue. No change in appetite. Skin: Negative for rash or itching. HEENT: Negative for congestion, sore throat, neck pain and neck stiffness. No change in vision or hearing. Hasn't noted any enlarged lymph nodes in the neck. Cardiovascular:  Negative for chest pain and palpitations. Has not noted pedal edema. Respiratory: Negative for cough, colds, sinus, hemoptysis, shortness of breath and wheezing. Gastrointestinal: Negative for nausea and vomiting, rectal bleeding, coffee ground emesis, abdominal pain, diarrhea and constipation.    Genitourinary: Negative for dysuria, frequency urgency, or burning on micturition. No flank pain, no foul smelling urine, no difficulty with initiating urination. Hematological: Negative for bleeding or easy bruising. Musculoskeletal: Negative  for arthralgias, back pain or neck pain. Neurological: Negative for dizziness, seizures or syncopal episodes. Denies headaches. Endocrine: Denies excessive thirst.  No heat/cold intolerance. Psychiatric: Negative for depression or insomnia. PHYSICAL EXAMINATION:     VITALS:   Visit Vitals  BP (!) 146/85   Pulse 86   Temp 98.2 °F (36.8 °C)   Ht 5' 11\" (1.803 m)   Wt 230 lb (104.3 kg)   SpO2 97%   BMI 32.08 kg/m²     GEN:  Well developed, well nourished 61 y.o. male in no acute distress. HEENT: Normocephalic and atraumatic. Eyes: Conjunctivae and EOM are normal.Pupils are equal, round, and reactive to light. External ear normal appearance, external nose normal appearing. Mouth/Throat: Oropharynx is clear and moist, able to handle oral secretions w/out difficulty, airway patent  NECK: Supple. Normal ROM, No lymphadenopathy. Trachea is midline. No bruising, swelling or deformity  RESP: Clear to auscultation bilaterally. No wheezes, rales, rhonchi. Normal effort and breath sounds. No respiratory distress  CARDIO: Normal rate, regular rhythm and normal heart sounds. No MGR. ABDOMEN: Soft, non-tender, non-distended, normoactive bowel sounds in all four quadrants. There is no tenderness. There is no rebound and no guarding.    BACK: No CVA or spinal tenderness  BREAST:  Deferred  PELVIC:    Deferred   RECTAL:  Deferred   :           Deferred  EXTREMITIES: EXAMINATION OF: left knee  Knee Examination                                                  R                                  L  Effusion                                   -                                   -  Warmth                                   -                                   -  Erythema                                 -                                   -  ROM Extension                    Full                              Full              Flexion                         Full                              Full  Tenderness              Medial Joint                 -                                   +              Lateral Joint                -                                   +              Posterior knee             -                                   -  Strength              Quad                           5                                  5              Hamstring                    5                                  5  Crepitus              Tibiofemoral                -                                   -              Patellofemoral             -                                   +  Instability              Anterior                       -                                   -              Posterior                      -                                   -              Patellofemoral             -                                   -  Lachman's                               -                                   -  Anterior Drawer                       -                                   -  Posterior Drawer                     -                                   -  Glen's              Pain                             -                                   +              Locking                        -                                   -  Calf TTP                                  -                                   -  Straight Leg Raise                  -                                   -     NEUROVASCULAR: Sensation intact to light touch and strength grossly intact and symmetrical. No nystagmus. Positive distal pulses and capillary refill. DVT ASSESSMENT:  There is not  calf tenderness. No evidence of DVT seen on physical exam.  MOTOR: In tact  PSYCH: Alert an oriented to person, place and time.  Mood, memory, affect, behavior and judgment normal       RADIOGRAPHS & DIAGNOSTIC STUDIES:     MRI/xray reveals :     Previous x-rays of his left knee were reviewed from November. These show mild medial knee arthrosis. No fractures or other malalignment is noted.     MRI of the left knee was reviewed. My interpretation is MRI is mild medial knee arthrosis and a partial medial meniscus tear. LABS:       Labs and EKG pending      ASSESSMENT:       Encounter Diagnoses   Name Primary?  Primary osteoarthritis of left knee Yes    Complex tear of medial meniscus of left knee as current injury, initial encounter     Pre-op exam     Post-op pain        PLAN:     Again, the alternatives, risks, and complications, as well as expected outcome were discussed. The patient understands and agrees to proceed with LEFT KNEE ARTHROSCOPIC PARTIAL MEDIAL MENISECTOMY.  Patient given orders listed below:    Orders Placed This Encounter    HYDROcodone-acetaminophen (Norco) 7.5-325 mg per tablet         Mirna Mathew PA-C  4/8/2022  10:41 AM

## 2022-04-14 NOTE — PERIOP NOTES
PRE-SURGICAL INSTRUCTIONS        Patient's Name:  Srini FERRERA Date:  4/14/2022             Surgery Date:  4/19/2022                1. Do NOT eat or drink anything, including candy, gum, or ice chips after midnight on 4/18/2022, unless you have specific instructions from your surgeon or anesthesia provider to do so.  2. You may brush your teeth before coming to the hospital.  3. No smoking 24 hours prior to the day of surgery. 4. No alcohol 24 hours prior to the day of surgery. 5. No recreational drugs for one week prior to the day of surgery. 6. Leave all valuables, including money/purse, at home. 7. Remove all jewelry, nail polish, no lotions powders, deodorant, or perfume/cologne/after shave on the skin. 8. Follow instruction for Hibiclens washes and CHG wipes from surgeon's office. 9. Glasses/contact lenses and dentures may be worn to the hospital.  They will be removed prior to surgery. 10. Call your doctor if symptoms of a cold or illness develop within 24-48 hours prior to your surgery. 11.  If you are having an outpatient procedure, please make arrangements for a responsible ADULT TO [address] [place] [address] and stay with you for 24 hours after your surgery. 12. ONE VISITOR in the hospital at this time for outpatient procedures. Exceptions may be made for surgical admissions, per nursing unit guidelines      Special Instructions:      Bring list of CURRENT medications. Bring any pertinent legal medical records. Take these medications the morning of surgery with a sip of water: medications as directed by physician. Follow physician instructions about stopping anticoagulants      On the day of surgery, come in the main entrance of [facility]. Let the  at the desk know you are there for surgery. A staff member will come escort you to the surgical area on the second floor.     If you have any questions or concerns, please do not hesitate to call:     (Prior to the day of surgery) [facility] department:  [phone]   (Day of surgery) Pre-Op department:  [phone]    These surgical instructions were reviewed with patient during the PAT phone call.

## 2022-04-15 ENCOUNTER — HOSPITAL ENCOUNTER (OUTPATIENT)
Dept: LAB | Age: 57
Discharge: HOME | End: 2022-04-15
Payer: COMMERCIAL

## 2022-04-15 PROCEDURE — 93005 ELECTROCARDIOGRAM TRACING: CPT

## 2022-04-16 LAB
ATRIAL RATE: 82 BPM
CALCULATED P AXIS, ECG09: 41 DEGREES
CALCULATED R AXIS, ECG10: 19 DEGREES
CALCULATED T AXIS, ECG11: 15 DEGREES
DIAGNOSIS, 93000: NORMAL
P-R INTERVAL, ECG05: 164 MS
Q-T INTERVAL, ECG07: 346 MS
QRS DURATION, ECG06: 84 MS
QTC CALCULATION (BEZET), ECG08: 404 MS
VENTRICULAR RATE, ECG03: 82 BPM

## 2022-04-18 ENCOUNTER — ANESTHESIA EVENT (OUTPATIENT)
Dept: SURGERY | Age: 57
End: 2022-04-18
Payer: COMMERCIAL

## 2022-04-19 ENCOUNTER — ANESTHESIA (OUTPATIENT)
Dept: SURGERY | Age: 57
End: 2022-04-19
Payer: COMMERCIAL

## 2022-04-19 ENCOUNTER — HOSPITAL ENCOUNTER (OUTPATIENT)
Age: 57
Setting detail: OUTPATIENT SURGERY
Discharge: HOME | End: 2022-04-19
Attending: ORTHOPAEDIC SURGERY | Admitting: ORTHOPAEDIC SURGERY
Payer: COMMERCIAL

## 2022-04-19 VITALS
WEIGHT: 221.6 LBS | RESPIRATION RATE: 12 BRPM | OXYGEN SATURATION: 99 % | BODY MASS INDEX: 31.02 KG/M2 | DIASTOLIC BLOOD PRESSURE: 60 MMHG | SYSTOLIC BLOOD PRESSURE: 158 MMHG | HEART RATE: 77 BPM | TEMPERATURE: 97.7 F | HEIGHT: 71 IN

## 2022-04-19 LAB
AMPHET UR QL SCN: NEGATIVE
BARBITURATES UR QL SCN: NEGATIVE
BENZODIAZ UR QL: NEGATIVE
CANNABINOIDS UR QL SCN: NEGATIVE
COCAINE UR QL SCN: NEGATIVE
HDSCOM,HDSCOM: ABNORMAL
METHADONE UR QL: NEGATIVE
OPIATES UR QL: POSITIVE
PCP UR QL: NEGATIVE

## 2022-04-19 PROCEDURE — 29881 ARTHRS KNE SRG MNISECTMY M/L: CPT | Performed by: ORTHOPAEDIC SURGERY

## 2022-04-19 PROCEDURE — 76010000149 HC OR TIME 1 TO 1.5 HR: Performed by: ORTHOPAEDIC SURGERY

## 2022-04-19 PROCEDURE — 74011250637 HC RX REV CODE- 250/637: Performed by: ORTHOPAEDIC SURGERY

## 2022-04-19 PROCEDURE — 2709999900 HC NON-CHARGEABLE SUPPLY: Performed by: ORTHOPAEDIC SURGERY

## 2022-04-19 PROCEDURE — 74011000250 HC RX REV CODE- 250: Performed by: ORTHOPAEDIC SURGERY

## 2022-04-19 PROCEDURE — 77030002916 HC SUT ETHLN J&J -A: Performed by: ORTHOPAEDIC SURGERY

## 2022-04-19 PROCEDURE — 74011250636 HC RX REV CODE- 250/636: Performed by: NURSE ANESTHETIST, CERTIFIED REGISTERED

## 2022-04-19 PROCEDURE — 74011250637 HC RX REV CODE- 250/637: Performed by: NURSE ANESTHETIST, CERTIFIED REGISTERED

## 2022-04-19 PROCEDURE — 74011000250 HC RX REV CODE- 250: Performed by: NURSE ANESTHETIST, CERTIFIED REGISTERED

## 2022-04-19 PROCEDURE — 76210000026 HC REC RM PH II 1 TO 1.5 HR: Performed by: ORTHOPAEDIC SURGERY

## 2022-04-19 PROCEDURE — 01400 ANES OPN/ARTHRS KNEE JT NOS: CPT | Performed by: ANESTHESIOLOGY

## 2022-04-19 PROCEDURE — 01400 ANES OPN/ARTHRS KNEE JT NOS: CPT | Performed by: NURSE ANESTHETIST, CERTIFIED REGISTERED

## 2022-04-19 PROCEDURE — 77030022036 HC BLD SHV TOMCAT STRY -B: Performed by: ORTHOPAEDIC SURGERY

## 2022-04-19 PROCEDURE — 76210000063 HC OR PH I REC FIRST 0.5 HR: Performed by: ORTHOPAEDIC SURGERY

## 2022-04-19 PROCEDURE — 77030000032 HC CUF TRNQT ZIMM -B: Performed by: ORTHOPAEDIC SURGERY

## 2022-04-19 PROCEDURE — 77030033005 HC TBNG ARTHSC PMP STRY -B: Performed by: ORTHOPAEDIC SURGERY

## 2022-04-19 PROCEDURE — 77030006668 HC BLD SHV MENSCS STRY -B: Performed by: ORTHOPAEDIC SURGERY

## 2022-04-19 PROCEDURE — 80307 DRUG TEST PRSMV CHEM ANLYZR: CPT

## 2022-04-19 PROCEDURE — 74011000272 HC RX REV CODE- 272: Performed by: ORTHOPAEDIC SURGERY

## 2022-04-19 PROCEDURE — 76060000033 HC ANESTHESIA 1 TO 1.5 HR: Performed by: ORTHOPAEDIC SURGERY

## 2022-04-19 RX ORDER — SODIUM CHLORIDE 0.9 % (FLUSH) 0.9 %
5-40 SYRINGE (ML) INJECTION EVERY 8 HOURS
Status: DISCONTINUED | OUTPATIENT
Start: 2022-04-19 | End: 2022-04-19 | Stop reason: HOSPADM

## 2022-04-19 RX ORDER — SODIUM CHLORIDE 0.9 % (FLUSH) 0.9 %
5-40 SYRINGE (ML) INJECTION AS NEEDED
Status: DISCONTINUED | OUTPATIENT
Start: 2022-04-19 | End: 2022-04-19 | Stop reason: HOSPADM

## 2022-04-19 RX ORDER — LIDOCAINE HYDROCHLORIDE 20 MG/ML
INJECTION, SOLUTION EPIDURAL; INFILTRATION; INTRACAUDAL; PERINEURAL AS NEEDED
Status: DISCONTINUED | OUTPATIENT
Start: 2022-04-19 | End: 2022-04-19 | Stop reason: HOSPADM

## 2022-04-19 RX ORDER — BUPIVACAINE HYDROCHLORIDE 5 MG/ML
INJECTION, SOLUTION EPIDURAL; INTRACAUDAL AS NEEDED
Status: DISCONTINUED | OUTPATIENT
Start: 2022-04-19 | End: 2022-04-19 | Stop reason: HOSPADM

## 2022-04-19 RX ORDER — OXYCODONE HYDROCHLORIDE 5 MG/1
5 TABLET ORAL ONCE
Status: COMPLETED | OUTPATIENT
Start: 2022-04-19 | End: 2022-04-19

## 2022-04-19 RX ORDER — HYDROMORPHONE HYDROCHLORIDE 2 MG/ML
0.5 INJECTION, SOLUTION INTRAMUSCULAR; INTRAVENOUS; SUBCUTANEOUS
Status: DISCONTINUED | OUTPATIENT
Start: 2022-04-19 | End: 2022-04-19 | Stop reason: HOSPADM

## 2022-04-19 RX ORDER — SODIUM CHLORIDE, SODIUM LACTATE, POTASSIUM CHLORIDE, CALCIUM CHLORIDE 600; 310; 30; 20 MG/100ML; MG/100ML; MG/100ML; MG/100ML
75 INJECTION, SOLUTION INTRAVENOUS CONTINUOUS
Status: DISCONTINUED | OUTPATIENT
Start: 2022-04-19 | End: 2022-04-19 | Stop reason: HOSPADM

## 2022-04-19 RX ORDER — HYDROMORPHONE HYDROCHLORIDE 2 MG/ML
0.2 INJECTION, SOLUTION INTRAMUSCULAR; INTRAVENOUS; SUBCUTANEOUS AS NEEDED
Status: DISCONTINUED | OUTPATIENT
Start: 2022-04-19 | End: 2022-04-19 | Stop reason: HOSPADM

## 2022-04-19 RX ORDER — ONDANSETRON 2 MG/ML
4 INJECTION INTRAMUSCULAR; INTRAVENOUS ONCE
Status: COMPLETED | OUTPATIENT
Start: 2022-04-19 | End: 2022-04-19

## 2022-04-19 RX ORDER — LIDOCAINE HYDROCHLORIDE 10 MG/ML
0.1 INJECTION, SOLUTION EPIDURAL; INFILTRATION; INTRACAUDAL; PERINEURAL AS NEEDED
Status: DISCONTINUED | OUTPATIENT
Start: 2022-04-19 | End: 2022-04-19 | Stop reason: HOSPADM

## 2022-04-19 RX ORDER — FENTANYL CITRATE 50 UG/ML
INJECTION, SOLUTION INTRAMUSCULAR; INTRAVENOUS AS NEEDED
Status: DISCONTINUED | OUTPATIENT
Start: 2022-04-19 | End: 2022-04-19 | Stop reason: HOSPADM

## 2022-04-19 RX ORDER — ONDANSETRON 2 MG/ML
INJECTION INTRAMUSCULAR; INTRAVENOUS AS NEEDED
Status: DISCONTINUED | OUTPATIENT
Start: 2022-04-19 | End: 2022-04-19 | Stop reason: HOSPADM

## 2022-04-19 RX ORDER — SODIUM CHLORIDE, SODIUM LACTATE, POTASSIUM CHLORIDE, CALCIUM CHLORIDE 600; 310; 30; 20 MG/100ML; MG/100ML; MG/100ML; MG/100ML
50 INJECTION, SOLUTION INTRAVENOUS CONTINUOUS
Status: DISCONTINUED | OUTPATIENT
Start: 2022-04-19 | End: 2022-04-19 | Stop reason: HOSPADM

## 2022-04-19 RX ORDER — KETOROLAC TROMETHAMINE 15 MG/ML
INJECTION, SOLUTION INTRAMUSCULAR; INTRAVENOUS AS NEEDED
Status: DISCONTINUED | OUTPATIENT
Start: 2022-04-19 | End: 2022-04-19 | Stop reason: HOSPADM

## 2022-04-19 RX ORDER — SODIUM CHLORIDE, SODIUM LACTATE, POTASSIUM CHLORIDE, CALCIUM CHLORIDE 600; 310; 30; 20 MG/100ML; MG/100ML; MG/100ML; MG/100ML
INJECTION, SOLUTION INTRAVENOUS
Status: DISCONTINUED | OUTPATIENT
Start: 2022-04-19 | End: 2022-04-19 | Stop reason: HOSPADM

## 2022-04-19 RX ORDER — PROPOFOL 10 MG/ML
INJECTION, EMULSION INTRAVENOUS AS NEEDED
Status: DISCONTINUED | OUTPATIENT
Start: 2022-04-19 | End: 2022-04-19 | Stop reason: HOSPADM

## 2022-04-19 RX ORDER — MIDAZOLAM HYDROCHLORIDE 1 MG/ML
INJECTION, SOLUTION INTRAMUSCULAR; INTRAVENOUS AS NEEDED
Status: DISCONTINUED | OUTPATIENT
Start: 2022-04-19 | End: 2022-04-19 | Stop reason: HOSPADM

## 2022-04-19 RX ORDER — DEXAMETHASONE SODIUM PHOSPHATE 4 MG/ML
INJECTION, SOLUTION INTRA-ARTICULAR; INTRALESIONAL; INTRAMUSCULAR; INTRAVENOUS; SOFT TISSUE AS NEEDED
Status: DISCONTINUED | OUTPATIENT
Start: 2022-04-19 | End: 2022-04-19 | Stop reason: HOSPADM

## 2022-04-19 RX ORDER — DIPHENHYDRAMINE HYDROCHLORIDE 50 MG/ML
12.5 INJECTION, SOLUTION INTRAMUSCULAR; INTRAVENOUS
Status: DISCONTINUED | OUTPATIENT
Start: 2022-04-19 | End: 2022-04-19 | Stop reason: HOSPADM

## 2022-04-19 RX ORDER — NALOXONE HYDROCHLORIDE 0.4 MG/ML
0.1 INJECTION, SOLUTION INTRAMUSCULAR; INTRAVENOUS; SUBCUTANEOUS AS NEEDED
Status: DISCONTINUED | OUTPATIENT
Start: 2022-04-19 | End: 2022-04-19 | Stop reason: HOSPADM

## 2022-04-19 RX ORDER — FAMOTIDINE 20 MG/1
20 TABLET, FILM COATED ORAL ONCE
Status: COMPLETED | OUTPATIENT
Start: 2022-04-19 | End: 2022-04-19

## 2022-04-19 RX ADMIN — SODIUM CHLORIDE, SODIUM LACTATE, POTASSIUM CHLORIDE, AND CALCIUM CHLORIDE 75 ML/HR: 600; 310; 30; 20 INJECTION, SOLUTION INTRAVENOUS at 11:40

## 2022-04-19 RX ADMIN — MIDAZOLAM HYDROCHLORIDE 2 MG: 2 INJECTION, SOLUTION INTRAMUSCULAR; INTRAVENOUS at 14:14

## 2022-04-19 RX ADMIN — OXYCODONE HYDROCHLORIDE 5 MG: 5 TABLET ORAL at 15:48

## 2022-04-19 RX ADMIN — DEXAMETHASONE SODIUM PHOSPHATE 8 MG: 4 INJECTION, SOLUTION INTRAMUSCULAR; INTRAVENOUS at 14:28

## 2022-04-19 RX ADMIN — FENTANYL CITRATE 100 MCG: 50 INJECTION, SOLUTION INTRAMUSCULAR; INTRAVENOUS at 14:33

## 2022-04-19 RX ADMIN — LIDOCAINE HYDROCHLORIDE 60 MG: 20 INJECTION, SOLUTION EPIDURAL; INFILTRATION; INTRACAUDAL; PERINEURAL at 14:21

## 2022-04-19 RX ADMIN — KETOROLAC TROMETHAMINE 30 MG: 15 INJECTION, SOLUTION INTRAMUSCULAR; INTRAVENOUS at 14:28

## 2022-04-19 RX ADMIN — ONDANSETRON 4 MG: 2 INJECTION INTRAMUSCULAR; INTRAVENOUS at 15:49

## 2022-04-19 RX ADMIN — FENTANYL CITRATE 100 MCG: 50 INJECTION, SOLUTION INTRAMUSCULAR; INTRAVENOUS at 14:14

## 2022-04-19 RX ADMIN — PROPOFOL 300 MG: 10 INJECTION, EMULSION INTRAVENOUS at 14:21

## 2022-04-19 RX ADMIN — SODIUM CHLORIDE, SODIUM LACTATE, POTASSIUM CHLORIDE, AND CALCIUM CHLORIDE: 600; 310; 30; 20 INJECTION, SOLUTION INTRAVENOUS at 14:14

## 2022-04-19 RX ADMIN — ONDANSETRON 4 MG: 2 INJECTION INTRAMUSCULAR; INTRAVENOUS at 14:28

## 2022-04-19 RX ADMIN — FAMOTIDINE 20 MG: 20 TABLET ORAL at 11:42

## 2022-04-19 NOTE — DISCHARGE INSTRUCTIONS
Patient Education        Knee Arthroscopy: What to Expect at Home  Your Recovery     Arthroscopy is a way to find problems and do surgery inside a joint without making a large cut (incision). Your doctor put a lighted tube with a tiny camera--called an arthroscope, or scope--and surgical tools through small incisions in your knee. You will feel tired for several days. Your knee will be swollen. And you may notice that your skin is a different color near the cuts (incisions). The swelling is normal and will start to go away in a few days. Keeping your leg higher than your heart will help with swelling and pain. You will probably need about 6 weeks to recover. If your doctor repaired damaged tissue, recovery will take longer. You may have to limit your activity until your knee strength and movement are back to normal. You may also be in a physical rehabilitation (rehab) program.  You may be able to return to a desk job or your normal routine in a few days. But if you do physical labor, it may be a few weeks to a few months before you can go back to work. This care sheet gives you a general idea about how long it will take for you to recover. But each person recovers at a different pace. Follow the steps below to get better as quickly as possible. How can you care for yourself at home? Activity    · Rest when you feel tired. Getting enough sleep will help you recover. Use pillows to raise your ankle and leg above the level of your heart.     · Try to walk each day, after your doctor has said you can. Start by walking a little more than you did the day before. Bit by bit, increase the amount you walk. Walking boosts blood flow and helps prevent pneumonia and constipation.     · You may have a brace or crutches or both.     · Your doctor will tell you how often and how much you can move your leg and knee.     · If you have a desk job, you may be able to return to work a few days after the surgery.  If you lift things or stand or walk a lot at work, it may take a few weeks to a few months before you can return.     · You can take a shower 48 to 72 hours after surgery and clean the incisions with regular soap and water. Do not take a bath or soak your knee until your doctor says it is okay.     · Ask your doctor when you can drive again.     · If you had a repair of torn tissue, follow your doctor's instructions for lifting things or moving your knee. Diet    · You can eat your normal diet. If your stomach is upset, try bland, low-fat foods like plain rice, broiled chicken, toast, and yogurt.     · Drink plenty of fluids, unless your doctor tells you not to.     · You may notice that your bowel movements are not regular right after your surgery. This is common. Try to avoid constipation and straining with bowel movements. You may want to take a fiber supplement every day. If you have not had a bowel movement after a couple of days, ask your doctor about taking a mild laxative. Medicines    · Your doctor will tell you if and when you can restart your medicines. You will also get instructions about taking any new medicines.     · If you take aspirin or some other blood thinner, ask your doctor if and when to start taking it again. Make sure that you understand exactly what your doctor wants you to do.     · Be safe with medicines. Take pain medicines exactly as directed. ? If the doctor gave you a prescription medicine for pain, take it as prescribed. ? If you are not taking a prescription pain medicine, ask your doctor if you can take an over-the-counter medicine.     · If you think your pain medicine is making you sick to your stomach:  ? Take your medicine after meals (unless your doctor has told you not to). ? Ask your doctor for a different pain medicine.     · If your doctor prescribed antibiotics, take them as directed. Do not stop taking them just because you feel better.  You need to take the full course of antibiotics. Incision care    · If you have a dressing over your cuts (incisions), keep it clean and dry. You may remove it 48 to 72 hours after the surgery.     · If your incisions are open to the air, keep the area clean and dry.     · If you have strips of tape on the incisions, leave the tape on for a week or until it falls off. Exercise    · Move your toes and ankle as much as your bandages will allow.     · Bend and straighten your knee slowly several times during the day.     · Depending on why you had the surgery, you may have to do ankle and leg exercises. Your doctor or physical therapist will give you exercises as part of a rehabilitation program.     · Stop any activity that causes sharp pain. Talk to your doctor or physical therapist about what sports or other exercise you can do. Ice    · To reduce swelling and pain, put ice or a cold pack on your knee for 10 to 20 minutes at a time. Do this every 1 to 2 hours. Put a thin cloth between the ice and your skin. If your doctor recommended cold therapy using a portable machine, follow the directions that came with the machine. Follow-up care is a key part of your treatment and safety. Be sure to make and go to all appointments, and call your doctor if you are having problems. It's also a good idea to know your test results and keep a list of the medicines you take. When should you call for help? Call 911 anytime you think you may need emergency care. For example, call if:    · You passed out (lost consciousness).     · You have severe trouble breathing.     · You have sudden chest pain and shortness of breath, or you cough up blood. Call your doctor now or seek immediate medical care if:    · Your foot or toes are numb or tingling.     · Your foot is cool or pale, or it changes color.     · You have signs of a blood clot, such as:  ? Pain in your calf, back of the knee, thigh, or groin. ?  Redness and swelling in your leg or groin.     · You are sick to your stomach or cannot keep fluids down.     · You have pain that does not get better after you take pain medicine.     · You have loose stitches, or your incision comes open.     · Bright red blood has soaked through the bandage over your incision.     · You have signs of infection, such as:  ? Increased pain, swelling, warmth, or redness. ? Red streaks leading from the incisions. ? Pus draining from the incisions. ? A fever. Watch closely for any changes in your health, and be sure to contact your doctor if:    · You do not have a bowel movement after taking a laxative. Where can you learn more? Go to http://www.gray.com/  Enter N004 in the search box to learn more about \"Knee Arthroscopy: What to Expect at Home. \"  Current as of: July 1, 2021               Content Version: 13.2  © 9410-7244 [employer]. Care instructions adapted under license by [employer] (which disclaims liability or warranty for this information). If you have questions about a medical condition or this instruction, always ask your healthcare professional. [address] any warranty or liability for your use of this information. DISCHARGE SUMMARY from Nurse    PATIENT INSTRUCTIONS:    After general anesthesia or intravenous sedation, for 24 hours or while taking prescription Narcotics:  · Limit your activities  · Do not drive and operate hazardous machinery  · Do not make important personal or business decisions  · Do  not drink alcoholic beverages  · If you have not urinated within 8 hours after discharge, please contact your surgeon on call.     Report the following to your surgeon:  · Excessive pain, swelling, redness or odor of or around the surgical area  · Temperature over 100.5  · Nausea and vomiting lasting longer than 4 hours or if unable to take medications  · Any signs of decreased circulation or nerve impairment to extremity: change in color, persistent  numbness, tingling, coldness or increase pain  · Any questions    What to do at Home:      *  Please give a list of your current medications to your Primary Care Provider. *  Please update this list whenever your medications are discontinued, doses are      changed, or new medications (including over-the-counter products) are added. *  Please carry medication information at all times in case of emergency situations. These are general instructions for a healthy lifestyle:    No smoking/ No tobacco products/ Avoid exposure to second hand smoke  Surgeon General's Warning:  Quitting smoking now greatly reduces serious risk to your health. Obesity, smoking, and sedentary lifestyle greatly increases your risk for illness    A healthy diet, regular physical exercise & weight monitoring are important for maintaining a healthy lifestyle    You may be retaining fluid if you have a history of heart failure or if you experience any of the following symptoms:  Weight gain of 3 pounds or more overnight or 5 pounds in a week, increased swelling in our hands or feet or shortness of breath while lying flat in bed. Please call your doctor as soon as you notice any of these symptoms; do not wait until your next office visit. The discharge information has been reviewed with the patient. The patient verbalized understanding. Discharge medications reviewed with the patient and appropriate educational materials and side effects teaching were provided.   ___________________________________________________________________________________________________________________________________

## 2022-04-19 NOTE — OP NOTES
[address] [place] [address]   OPERATIVE REPORT     Patient Name:  Deanna Ramos  Medical Record Number:    [id number]  YOB: 1965  ACCOUNT #:   [de-identified]  DATE OF SERVICE:   4/19/2022     PREOPERATIVE DIAGNOSES:  1. Left knee partial medial meniscus tear     POSTOPERATIVE DIAGNOSES:  1. Left knee partial medial meniscus tear     PROCEDURES PERFORMED:  1. Left knee arthroscopic partial medial meniscectomy     SURGEON:  Oracio Gant. Layne Bonds MD     ASSISTANT:   Sterling Del Rio SA     ANESTHESIA:   General     COMPLICATIONS:   None     SPECIMENS REMOVED:   Partial medial meniscectomy     IMPLANTS:   None     ESTIMATED BLOOD LOSS:   Minimal     IV FLUIDS:   500 cc LR     INDICATIONS FOR PROCEDURE:   The patient is a 54-year-old male presents to the office with left knee pain. This failed conservative management. An MRI revealed a partial medial meniscus tear. After discussing the treatment options at length including operative and nonoperative management he elected undergo the procedure as described. PROCEDURE:   The patient was seen in the preoperative holding area. The left knee was marked as the operative extremity. After discussing the risk and benefits of the procedure informed consent was confirmed. The patient was then taken to the operating room. He was moved from the stretcher to the OR table. General anesthesia was induced and an LMA was placed. A well-padded nonsterile tourniquet was placed about the left upper thigh prior to prepping and draping. The left leg was then prepped and draped in normal sterile fashion. A timeout was performed. Antibiotics were given prior to insufflation of the tourniquet. The surgical portal sites were marked. The leg was elevated and exsanguinated with an Esmarch bandage and the tourniquet was insufflated to 300 mmHg. Total tourniquet time was 18 minutes. The portal sites were injected with local anesthetic.   Lateral portal was made through a stab incision. The trocar was placed into the patellofemoral joint. The knee was brought into flexion and the scope was brought into the medial compartment. Under spinal needle localization a medial portal was made. There was noted to be a degenerative type posterior horn medial meniscus tear. This was debrided to a stable edge using an arthroscopic shaver and biter. 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 The scope was then brought into the notch. The fat pad was excised. The scope was brought to the lateral compartment and the leg was placed in the figure-of-four position. There was no tearing of the lateral meniscus noted. No arthritic changes noted in the lateral compartment. The scope was then brought back into the patellofemoral joint with the knee brought into flexion. Minimal arthritic changes noted in the patellofemoral joint. The gutters were checked and found to be free of any loose debris. The knee was drained. The scope instruments were removed. The portal sites were closed. A sterile dressing was placed over the left knee. The tourniquet was let down. The patient was awakened from anesthesia and taken to PACU in stable condition with a plan for discharge home.      Electronically Signed Up   Kate Easton MD  04/19/22  5:11 PM

## 2022-04-19 NOTE — INTERVAL H&P NOTE
Update History & Physical    The Patient's History and Physical of April 8, 2022 was reviewed with the patient and I examined the patient. There was no change. The surgical site was confirmed by the patient and me. Plan:  The risk, benefits, expected outcome, and alternative to the recommended procedure have been discussed with the patient. Patient understands and wants to proceed with the procedure.     Electronically signed by Babar Lala MD on 4/19/2022 at 1:46 PM

## 2022-04-19 NOTE — ANESTHESIA PREPROCEDURE EVALUATION
Relevant Problems   No relevant active problems       Anesthetic History   No history of anesthetic complications            Review of Systems / Medical History  Patient summary reviewed, nursing notes reviewed and pertinent labs reviewed    Pulmonary        Sleep apnea: No treatment           Neuro/Psych   Within defined limits           Cardiovascular    Hypertension: well controlled                   GI/Hepatic/Renal  Within defined limits              Endo/Other        Arthritis     Other Findings              Physical Exam    Airway  Mallampati: II  TM Distance: 4 - 6 cm  Neck ROM: normal range of motion   Mouth opening: Normal     Cardiovascular    Rhythm: regular  Rate: normal         Dental  No notable dental hx       Pulmonary  Breath sounds clear to auscultation               Abdominal  GI exam deferred       Other Findings            Anesthetic Plan    ASA: 2  Anesthesia type: general          Induction: Intravenous  Anesthetic plan and risks discussed with: Patient

## 2022-04-19 NOTE — BRIEF OP NOTE
Brief Postoperative Note    Patient: Tata Alcantara  YOB: 1965  MRN: [id number]    Date of Procedure: 4/19/2022     Pre-Op Diagnosis: S83.232A LEFT KNEE PARTIAL MEDIAL MENISECTOMY    Post-Op Diagnosis: Same as preoperative diagnosis.       Procedure(s):  LEFT KNEE ARTHROSCOPIC PARTIAL MEDIAL MENISECTOMY    Surgeon(s):  Zackary Ford MD    Surgical Assistant: Surg Asst-1: Carleen Mendoza    Anesthesia: General     Estimated Blood Loss (mL): Minimal    Complications: None    Specimens: * No specimens in log *     Implants: * No implants in log *    Drains: * No LDAs found *    Findings: Left knee partial medial meniscectomy     Electronically Signed by Sandy Baez MD on 4/19/2022 at 2:53 PM

## 2022-04-19 NOTE — ANESTHESIA POSTPROCEDURE EVALUATION
Procedure(s):  LEFT KNEE ARTHROSCOPIC PARTIAL MEDIAL MENISECTOMY. general    Anesthesia Post Evaluation      Multimodal analgesia: multimodal analgesia used between 6 hours prior to anesthesia start to PACU discharge  Patient location during evaluation: bedside  Patient participation: complete - patient participated  Level of consciousness: awake  Pain management: adequate  Airway patency: patent  Anesthetic complications: no  Cardiovascular status: stable  Respiratory status: acceptable  Hydration status: acceptable  Post anesthesia nausea and vomiting:  controlled  Final Post Anesthesia Temperature Assessment:  Normothermia (36.0-37.5 degrees C)      INITIAL Post-op Vital signs: No vitals data found for the desired time range.

## 2022-04-25 NOTE — PROGRESS NOTES
Patient: Alfredo Luong  YOB: 1965       HISTORY:  The patient presents for reevaluation of his left knee status post arthroscopic partial medial menisectomy on 4/19/22. Patient is improved, states pain is a 0 out of 10.  he has not gone to physical therapy. He has been compliant with his restrictions. Patient denies any fever, chills, chest pain, shortness of breath or calf pain. There are no new illness or injuries to report since last seen in the office. PHYSICAL EXAMINATION:    Visit Vitals  Pulse 99   Temp 98 °F (36.7 °C) (Temporal)   SpO2 96%     The patient is a well-developed, well-nourished male in no acute distress. The patient is alert and oriented times three. The patient appears to be well groomed. Mood and affect are normal.   ORTHOPEDIC EXAM of Left knee: Inspection: Effusion present,  incisions clean, dry intact, sutures in place  TTP: incisional  Range of motion: -10-80 flexion  Stability: Stable  Strength: 5/5  2+ distal pulses    IMPRESSION: left knee status post arthroscopic partial medial menisectomy. PLAN:  Pt doing well post operatively  Incisions cleaned. Sutures removed and steri strips applied  Surgery was discussed at length today. Stressed to patient that nothing causes an increase in pain or swelling. Patient is weight bearing as tolerated. OK to d/c use of crutches/walker. Will set up with physical therapy. Order placed today. Patient not given a refill of pain medication. Patient will follow up in 3 weeks.     [facility]

## 2022-04-26 ENCOUNTER — OFFICE VISIT (OUTPATIENT)
Dept: ORTHOPEDIC SURGERY | Age: 57
End: 2022-04-26
Payer: COMMERCIAL

## 2022-04-26 VITALS — TEMPERATURE: 98 F | OXYGEN SATURATION: 96 % | HEART RATE: 99 BPM

## 2022-04-26 DIAGNOSIS — S83.232A COMPLEX TEAR OF MEDIAL MENISCUS OF LEFT KNEE AS CURRENT INJURY, INITIAL ENCOUNTER: Primary | ICD-10-CM

## 2022-04-26 DIAGNOSIS — Z98.890 STATUS POST ARTHROSCOPIC PARTIAL MEDIAL MENISCECTOMY: ICD-10-CM

## 2022-04-26 PROCEDURE — 99024 POSTOP FOLLOW-UP VISIT: CPT | Performed by: ORTHOPAEDIC SURGERY

## 2022-05-03 ENCOUNTER — HOSPITAL ENCOUNTER (OUTPATIENT)
Dept: PHYSICAL THERAPY | Age: 57
Discharge: HOME | End: 2022-05-03
Attending: ORTHOPAEDIC SURGERY
Payer: COMMERCIAL

## 2022-05-03 PROCEDURE — 97162 PT EVAL MOD COMPLEX 30 MIN: CPT | Performed by: PHYSICAL THERAPIST

## 2022-05-03 PROCEDURE — 97110 THERAPEUTIC EXERCISES: CPT | Performed by: PHYSICAL THERAPIST

## 2022-05-03 NOTE — PROGRESS NOTES
PT DAILY TREATMENT NOTE/KNEE EVAL     Patient Name: Srini Carranza  Date:5/3/2022  : 1965  [x]  Patient  Verified  Payor: BLUE CROSS / Plan: [address] [place] [address] / Product Type: PPO /    In time:7:57  Out time:8:55  Total Treatment Time (min): 58  Visit #: 1 of 12    Medicare/BCBS Only   Total Timed Codes (min):  25 1:1 Treatment Time:  48     Treatment Area: Left knee pain [M25.562]    SUBJECTIVE  Pain Level (0-10 scale): 0/10 now, 0/10 at best, 10/10 at worst  []constant [x]intermittent [x]improving []worsening []no change since onset    Any medication changes, allergies to medications, adverse drug reactions, diagnosis change, or new procedure performed?: [x] No    [] Yes (see summary sheet for update)  Subjective functional status/changes:     PLOF: Walking for exercise, Independent activity level. Limitations to PLOF: Limited ambulation, difficulty on stairs and moving. Mechanism of Injury: No specific injury. Notes that he started having problems in  after his right knee surgery. 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 Current symptoms/Complaints: Patient CC is stiffness in the left knee. He notes pain with some activities. Notes difficulty walking, and on stairs. Previous Treatment/Compliance: None  PMHx/Surgical Hx: Right knee surgery. Left knee scope 2022. Work Hx: Disabled  Living Situation: No stairs. Pt Goals: \"Get back to where I was 100% and get the strength back\". Barriers: []pain []financial []time []transportation []other   Cognition: A & O x 3    Other:    OBJECTIVE/EXAMINATION  Domestic Life: Lives with family  Activity/Recreational Limitations: Limited walking and stairs. Mobility: ambulates FWB, no AD  Self Care: At times may need help with shoes and socks. Modality rationale: decrease edema, decrease inflammation and decrease pain to improve the patients ability to increase tolerance to activity.    Min Type Additional Details    [] Estim: []Unatt       []IFC  []Premod                        []Other:  []w/ice   []w/heat  Position:  Location:    [] Estim: []Att    []TENS instruct  []NMES                    []Other:  []w/US   []w/ice   []w/heat  Position:  Location:    []  Traction: [] Cervical       []Lumbar                       [] Prone          []Supine                       []Intermittent   []Continuous Lbs:  [] before manual  [] after manual    []  Ultrasound: []Continuous   [] Pulsed                           []1MHz   []3MHz Location:  W/cm2:    []  Iontophoresis with dexamethasone         Location: [] Take home patch   [] In clinic   10 [x]  Ice     []  heat  []  Ice massage  []  Laser   []  Anodyne Position: supine  Location:left knee    []  Laser with stim  []  Other: Position:  Location:    []  Vasopneumatic Device Pressure:       [] lo [] med [] hi   Temperature: [] lo [] med [] hi   [] Skin assessment post-treatment:  []intact []redness- no adverse reaction    []redness - adverse reaction:     23 min [x]Eval                  []Re-Eval       25 min Therapeutic Exercise:  [] See flow sheet :   Rationale: increase ROM and increase strength to improve the patients ability to increase patients ADLs. With   [] TE   [] TA   [] neuro   [] other: Patient Education: [x] Review HEP    [] Progressed/Changed HEP based on:   [] positioning   [] body mechanics   [] transfers   [] heat/ice application    [] other:      Other Objective/Functional Measures:     Physical Therapy Evaluation - Knee    Posture: [] Varus    [x] Valgus    [] Recurvatum        [] Tibial Torsion    [] Foot Supination    [] Foot Pronation    Describe:    Gait:  [] Normal    [] Abnormal    [x] Antalgic    [] NWB    Device:None. Sometimes a cane. Describe: Decreased stance time on the left. Increased lateral lean left.     ROM / Strength  [] Unable to assess                  AROM                      PROM                   Strength (1-5)    Left Right Left Right Left Right   Hip Flexion WNL WNL    4    Extension WNL WNL        Abduction WNL WNL    4    Adduction WNL WNL    4   Knee Flexion 72 127 82   4+    Extension 10 0 8   4                           Flexibility: [] Unable to assess at this time  Hamstrings:    (L) Tightness= [x] WNL   [] Min   [] Mod   [] Severe    (R) Tightness= [] WNL   [] Min   [] Mod   [] Severe  Quadriceps:    (L) Tightness= [] WNL   [] Min   [] Mod   [x] Severe    (R) Tightness= [x] WNL   [] Min   [] Mod   [] Severe    Palpation:   Neg/Pos  Neg/Pos  Neg/Pos   Joint Line(Med/Lat) (-) Quad tendon (+) little Patellar tendon (-)   Patella (-) Gastrocnemius(Med/Lat) (-) Pes Anserinus (-)   Popliteal Fossa (-) Hamstring tendons(Med/Lat) (-) MCL/LCL (-)     Optional Tests:  Patellar Positioning (Static)   []L []R Normal [x]L []R Lateral   []L []R [place]ine [place]      []L []R Medial   []L []R [place]    Patellar Tracking   [x]L []R Glide (Lat)   [x]L []R Tilt (Lat)     []L []R Glide (Med)  []L []R Tilt (Med)      []L []R Tile (Inf)     Patellar Mobility   []L []R Hypermobile [x]L []R Hypomobile         Girth Measurements:     Cm at midpatella     Cm at   Cm below joint line  Cm at joint line   Left 42.4       Right           Valgus@ 0 Degrees [x] Neg    [] Pos   Valgus@ 30 Degrees [x] Neg    [] Pos   Varus@ 0 Degrees [x] Neg    [] Pos   Varus@ 30 Degrees [x] Neg    [] Pos               Pain Level (0-10 scale) post treatment: 0    ASSESSMENT/Changes in Function: Patient with signs and symptoms consistent with left knee pain s/p arthroscopic surgery on 4/19/2022 for torn meniscus. He notes stiffness versus pain. He presents with an antalgic gait, no AD used. He has limited AROM but remains strong within the available ROM. Functionally, notes he sometimes uses a cane, when shopping leans on a cart.   He is limited in how far he can walk and notes difficulty on stairs but does not have stairs at home.       Patient will continue to benefit from skilled PT services to modify and progress therapeutic interventions, address functional mobility deficits, address ROM deficits, address strength deficits, analyze and address soft tissue restrictions, analyze and cue movement patterns and analyze and modify body mechanics/ergonomics to attain remaining goals. [x]  See Plan of Care  []  See progress note/recertification  []  See Discharge Summary         Progress towards goals / Updated goals:  Short Term Goals: To be accomplished in 1 weeks:  1. Patient will become proficient in their HEP and will be compliant in performing that program.  Evaluation:   Patient given a written/illustrated HEP. 2.  Patient will achieve 0-100 degrees PROM, left knee, in order to increase his functional mobility. Evaluation:  PROM 8-82 degrees     Long Term Goals: To be accomplished in 4 weeks:  1. Patient's pain level will be 0-3/10 with activity in order to improve patient's ability to perform normal ADLs. Evaluation:  0/10-10/10  2. Patient will demonstrate 0-120 degrees AROM left knee to increase ease of ADLs. Evaluation:  AROM left knee 10-72 degrees. 3. Patient will increase FOTO score to 65 to indicate increased functional mobility. Evaluation:  43  4. Patient will report little difficulty with light activities around his home in order to increase his functional ADLs. .  Evaluation:  Notes moderate difficulty.     PLAN  [x]  Upgrade activities as tolerated     [x]  Continue plan of care  []  Update interventions per flow sheet       []  Discharge due to:_  []  Other:_      Yesika Talley, PT 5/3/2022  7:05 AM

## 2022-05-03 NOTE — PROGRESS NOTES
[facility]              [address]        [place], [address]  [phone] fax    Plan of Care/ Statement of Necessity for Physical Therapy Services  Patient name: Eileen Addison Start of Care: 5/3/2022   Referral source: Kay Long [place] : 1965    Medical Diagnosis: Left knee pain [M25.562]  Payor: LENIN CROSS / Plan: [address] [place] [address] / Product Type: PPO /  Onset Date:2022    Treatment Diagnosis: Left Knee Pain   Prior Hospitalization: see medical history Provider#: [id number]   Medications: Verified on Patient summary List    Comorbidities: Arthritis, Back Pain, HBP, Osteoporosis, prior surgery. Prior Level of Function: Walking for exercise, Independent activity level. States he is disabled. The Plan of Care and following information is based on the information from the initial evaluation. Assessment/ key information: Patient with signs and symptoms consistent with left knee pain s/p arthroscopic surgery on 2022 for torn meniscus. He notes stiffness versus pain. He presents with an antalgic gait, no AD used. He has limited AROM but remains strong within the available ROM. Functionally, notes he sometimes uses a cane, when shopping leans on a cart. He is limited in how far he can walk and notes difficulty on stairs but does not have stairs at home. Patient will benefit from a program of skilled physical therapy to include therapeutic exercises to address strength deficits, therapeutic activities to improve functional mobility, neuromuscular reeducation to address balance, coordination and proprioception, manual therapy to address ROM and tissue extensibility and modalities as indicated. All questions were answered.     Evaluation Complexity History MEDIUM  Complexity : 1-2 comorbidities / personal factors will impact the outcome/ POC ; Examination MEDIUM Complexity : 3 Standardized tests and measures addressing body structure, function, activity limitation and / or participation in recreation  ;Presentation MEDIUM Complexity : Evolving with changing characteristics  ; Clinical Decision Making MEDIUM Complexity : FOTO score of 26-74  Overall Complexity Rating: MEDIUM  Problem List: pain affecting function, decrease ROM, decrease strength, edema affecting function, impaired gait/ balance, decrease ADL/ functional abilitiies, decrease activity tolerance and decrease flexibility/ joint mobility   Treatment Plan may include any combination of the following: Therapeutic exercise, Therapeutic activities, Neuromuscular re-education, Physical agent/modality and Manual therapy  Patient / Family readiness to learn indicated by: asking questions, trying to perform skills and interest  Persons(s) to be included in education: patient (P)  Barriers to Learning/Limitations: None  Patient Goal (s): Get back to where I was 100% and get the strength back  Patient Self Reported Health Status: good  Rehabilitation Potential: good    Short Term Goals: To be accomplished in 1 weeks:  1. Patient will become proficient in their HEP and will be compliant in performing that program.  Evaluation:   Patient given a written/illustrated HEP. 2.  Patient will achieve 0-100 degrees PROM, left knee, in order to increase his functional mobility. Evaluation:  PROM 8-82 degrees    Long Term Goals: To be accomplished in 4 weeks:  1. Patient's pain level will be 0-3/10 with activity in order to improve patient's ability to perform normal ADLs. Evaluation:  0/10-10/10  2. Patient will demonstrate 0-120 degrees AROM left knee to increase ease of ADLs. Evaluation:  AROM left knee 10-72 degrees. 3. Patient will increase FOTO score to 65 to indicate increased functional mobility. Evaluation:  43  4. Patient will report little difficulty with light activities around his home in order to increase his functional ADLs. .  Evaluation:  Notes moderate difficulty. Frequency / Duration: Patient to be seen 2-3 times per week for 4 weeks. Patient/ Caregiver education and instruction: Diagnosis, prognosis, exercises   [x]  Plan of care has been reviewed with RAYSHAWN Clarke, PT 5/3/2022 7:03 AM  ________________________________________________________________________    I certify that the above Therapy Services are being furnished while the patient is under my care. I agree with the treatment plan and certify that this therapy is necessary.     [de-identified] Signature:____________Date:_________TIME:________     MONA Parker  ** Signature, Date and Time must be completed for valid certification **  Please sign and return to [facility] - [address], [address]   [phone] fax

## 2022-05-16 ENCOUNTER — HOSPITAL ENCOUNTER (OUTPATIENT)
Dept: PHYSICAL THERAPY | Age: 57
Discharge: HOME | End: 2022-05-16
Attending: ORTHOPAEDIC SURGERY
Payer: COMMERCIAL

## 2022-05-16 PROCEDURE — 97110 THERAPEUTIC EXERCISES: CPT | Performed by: PHYSICAL THERAPIST

## 2022-05-16 PROCEDURE — 97140 MANUAL THERAPY 1/> REGIONS: CPT | Performed by: PHYSICAL THERAPIST

## 2022-05-16 NOTE — PROGRESS NOTES
PT DAILY TREATMENT NOTE     Patient Name: Ryan Espinoza  Date:2022  : 1965  [x]  Patient  Verified  Payor: BLUE CROSS / Plan: [address] [place] [address] / Product Type: PPO /    In time:2:10  Out time:3:00  Total Treatment Time (min): 50  Visit #: 2 of 12    Medicare/BCBS Only   Total Timed Codes (min):  40 1:1 Treatment Time:  40       Treatment Area: Left knee pain [M25.562]    SUBJECTIVE  Pain Level (0-10 scale): 0  Any medication changes, allergies to medications, adverse drug reactions, diagnosis change, or new procedure performed?: [x] No    [] Yes (see summary sheet for update)  Subjective functional status/changes:   [] No changes reported  Patient reports he has been doing his HEP. States he has not had pain in over a week. OBJECTIVE    Modality rationale: decrease edema, decrease inflammation and decrease pain to improve the patients ability to increase his functional mobility.    Min Type Additional Details    [] Estim:  []Unatt       []IFC  []Premod                        []Other:  []w/ice   []w/heat  Position:  Location:    [] Estim: []Att    []TENS instruct  []NMES                    []Other:  []w/US   []w/ice   []w/heat  Position:  Location:    []  Traction: [] Cervical       []Lumbar                       [] Prone          []Supine                       []Intermittent   []Continuous Lbs:  [] before manual  [] after manual    []  Ultrasound: []Continuous   [] Pulsed                           []1MHz   []3MHz W/cm2:  Location:    []  Iontophoresis with dexamethasone         Location: [] Take home patch   [] In clinic   10 [x]  Ice     []  heat  []  Ice massage  []  Laser   []  Anodyne Position:Sitting  Location: left knee    []  Laser with stim  []  Other:  Position:  Location:    []  Vasopneumatic Device    []  Right     []  Left  Pre-treatment girth:  Post-treatment girth:  Measured at (location):  Pressure:       [] lo [] med [] hi   Temperature: [] lo [] med [] hi   [] Skin assessment post-treatment:  []intact []redness- no adverse reaction    []redness - adverse reaction:       30 min Therapeutic Exercise:  [] See flow sheet :   Rationale: increase ROM and increase strength to improve the patients ability to increase patients ADLs. 10 min Manual Therapy:  Tibia-femoral mobs in hook lying; grade II-III AP and PA mobs. Patellar mobs in supine. The manual therapy interventions were performed at a separate and distinct time from the therapeutic activities interventions. Rationale: increase ROM and increase tissue extensibility to increase ease of motion to improve function. With   [] TE   [] TA   [] neuro   [] other: Patient Education: [x] Review HEP    [] Progressed/Changed HEP based on:   [] positioning   [] body mechanics   [] transfers   [] heat/ice application    [] other:      Other Objective/Functional Measures: PROM 0-115 degrees. AROM left knee 6-108 degrees. Pain Level (0-10 scale) post treatment: 0    ASSESSMENT/Changes in Function: Patient with increased AROM/PROM and decreased subjective c/o pain. Patient will continue to benefit from skilled PT services to modify and progress therapeutic interventions, address functional mobility deficits, address ROM deficits, address strength deficits, analyze and address soft tissue restrictions, analyze and cue movement patterns and analyze and modify body mechanics/ergonomics to attain remaining goals. [x]  See Plan of Care  []  See progress note/recertification  []  See Discharge Summary         Progress towards goals / Updated goals:  Short Term Goals: To be accomplished in 1 weeks:  1.  Patient will become proficient in their HEP and will be compliant in performing that program.  Evaluation:   Patient given a written/illustrated HEP. Current:  Patient reports compliance with his HEP.  5/16/2022. Goal Met.   2.  Patient will achieve 0-100 degrees PROM, left knee, in order to increase his functional mobility. Evaluation:  PROM 8-82 degrees  Current:  PROM 0-115 degrees. 5/16/2022. Goal Met.     Long Term Goals: To be accomplished in 4 weeks:  1. Patient's pain level will be 0-3/10 with activity in order to improve patient's ability to perform normal ADLs. Evaluation:  0/10-10/10  Current: Patient notes he has not had pain in the past week. 5/16/2022. Goal Met.  2. Patient will demonstrate 0-120 degrees AROM left knee to increase ease of ADLs. Evaluation:  AROM left knee 10-72 degrees. Current:  AROM left knee 6-108 degrees. 5/16/2022. Progressing. 3. Patient will increase FOTO score to 65 to indicate increased functional mobility. Evaluation:  43  4. Patient will report little difficulty with light activities around his home in order to increase his functional ADLs. .  Evaluation:  Notes moderate difficulty.     PLAN  [x]  Upgrade activities as tolerated     [x]  Continue plan of care  []  Update interventions per flow sheet       []  Discharge due to:_  []  Other:_      Yesika Talley, PT 5/16/2022  2:11 PM    Future Appointments   Date Time Provider Manoj Caruso   5/18/2022  2:45 PM [place], [place] [facility]   5/23/2022  3:30 PM Francisco Langston [facility]   5/25/2022  3:30 PM [place], [place] [facility]   5/31/2022  3:30 PM Giovana Bryant, PT [facility]   6/2/2022  3:30 PM Giovana Bryant, PT [facility]   6/6/2022  2:45 PM Giovana Bryant, PT [facility]   6/8/2022  3:30 PM Lise Salcedo, PT [facility]

## 2022-05-18 ENCOUNTER — HOSPITAL ENCOUNTER (OUTPATIENT)
Dept: PHYSICAL THERAPY | Age: 57
Discharge: HOME | End: 2022-05-18
Attending: ORTHOPAEDIC SURGERY
Payer: COMMERCIAL

## 2022-05-18 PROCEDURE — 97110 THERAPEUTIC EXERCISES: CPT

## 2022-05-18 PROCEDURE — 97140 MANUAL THERAPY 1/> REGIONS: CPT

## 2022-05-18 NOTE — PROGRESS NOTES
PT DAILY TREATMENT NOTE     Patient Name: Mya Drummond  Date:2022  : 1965  [x]  Patient  Verified  Payor: [employer] / Plan: [address] [place] [address] / Product Type: PPO /    In time:257  Out time:351  Total Treatment Time (min): 47  Visit #: 3 of 12    Medicare/BCBS Only   Total Timed Codes (min):  44 1:1 Treatment Time:  34       Treatment Area: Left knee pain [M25.562]    SUBJECTIVE  Pain Level (0-10 scale): 0  Any medication changes, allergies to medications, adverse drug reactions, diagnosis change, or new procedure performed?: [x] No    [] Yes (see summary sheet for update)  Subjective functional status/changes:   [] No changes reported  Pt reports compliance with HEP and feels he is making good progress with recovery. OBJECTIVE    Modality rationale: decrease inflammation and decrease pain to improve the patients ability to improve ease with sleep   Min Type Additional Details   10 [x]  Ice     []  heat  []  Ice massage Position: Seated  Location: Left Knee, Post-tx     14 min Therapeutic Exercise:  [x] See flow sheet : Emphasis placed on improving available knee and LE AROM and strength   Rationale: increase ROM and increase strength to improve the patients ability to improve ease with household ADLs    10 min Neuromuscular Re-education:  [x]  See flow sheet : Emphasis placed on improving activation and recruitment of the quadriceps and gluteal musculature and improving LE proprioceptive and kinesthetic awareness   Rationale: increase ROM, increase strength, improve balance and increase proprioception  to improve the patients ability to improve safety with community ADLs    10 min Manual Therapy:    Supine, Left Femur AP Grade III Mobilization)  Supine, Left Knee Extension Passive Physiological Grade III Mobilization   The manual therapy interventions were performed at a separate and distinct time from the therapeutic activities interventions.   Rationale: decrease pain, increase ROM and increase tissue extensibility to improve ease with stair management      With   [] TE   [] TA   [] neuro   [] other: Patient Education: [x] Review HEP    [] Progressed/Changed HEP based on:   [] positioning   [] body mechanics   [] transfers   [] heat/ice application    [] other:      Other Objective/Functional Measures:   Pre-manual: Left knee extension AROM 0 degrees, Left knee flexion AROM 0-109 degrees  (-) Left Hamstring 90/90 Stretch    Pain Level (0-10 scale) post treatment: 0/10    ASSESSMENT/Changes in Function: Pt has made significant progress towards ROM goals and reports being pain-free. Pt reports satisfaction current progress and has strong desire to advance intensity of exercise. Pt continues to present with non-pitting edema localized at left knee, may be correlated with perceived stiffness during terminal knee extension. Pt progressed today with increased weights for knee flexion and extension exercises for strengthening and improved stability with weightbearing. Will continue to progress weight for knee strengthening next session to continue towards strength goals. Patient will continue to benefit from skilled PT services to modify and progress therapeutic interventions, address functional mobility deficits, address ROM deficits, address strength deficits, analyze and address soft tissue restrictions, analyze and cue movement patterns, analyze and modify body mechanics/ergonomics, assess and modify postural abnormalities, address imbalance/dizziness and instruct in home and community integration to attain remaining goals. []  See Plan of Care  []  See progress note/recertification  []  See Discharge Summary         Progress towards goals / Updated goals:    Short Term Goals: To be accomplished in 1 weeks:  1.  Patient will become proficient in their HEP and will be compliant in performing that program.  Evaluation:   Patient given a written/illustrated HEP.   Current: Patient reports compliance with his HEP.  5/16/2022. Goal Met. 2.  Patient will achieve 0-100 degrees PROM, left knee, in order to increase his functional mobility. Evaluation:  PROM 8-82 degrees  Current:  PROM 0-115 degrees. 5/16/2022. Goal Met.     Long Term Goals: To be accomplished in 4 weeks:  1. Patient's pain level will be 0-3/10 with activity in order to improve patient's ability to perform normal ADLs. Evaluation:  0/10-10/10  Current: Patient notes he has not had pain in the past week. 5/16/2022. Goal Met.  2. Patient will demonstrate 0-120 degrees AROM left knee to increase ease of ADLs. Evaluation:  AROM left knee 10-72 degrees. Current:  AROM left knee 6-108 degrees. 5/16/2022. Progressing. 3. Patient will increase FOTO score to 65 to indicate increased functional mobility. Evaluation:  43  4. Patient will report little difficulty with light activities around his home in order to increase his functional ADLs. .  Evaluation:  Notes moderate difficulty.       PLAN  [x]  Upgrade activities as tolerated     [x]  Continue plan of care  []  Update interventions per flow sheet       []  Discharge due to:_  []  Other:_       [facility], [facility]  Sadie Morris, PT 5/18/2022  8:28 AM    Future Appointments   Date Time Provider Manoj Caruso   5/18/2022  2:45 PM Maynor [place], [place] [facility]   5/23/2022  3:30 PM Birgit Page [facility]   5/25/2022  3:30 PM Maynor [facility] [place] [facility]   5/31/2022  3:30 PM Geoff Castaneda, PT [facility]   6/2/2022  3:30 PM Geoff Castaneda, PT [facility]   6/6/2022  2:45 PM Geoff Castaneda, PT [facility]   6/8/2022  3:30 PM Dinesh Salcedo, PT [facility]

## 2022-05-19 DIAGNOSIS — G89.18 POST-OP PAIN: Primary | ICD-10-CM

## 2022-05-19 NOTE — TELEPHONE ENCOUNTER
Last Visit: 4/26/22 with MONA Falk *post-op 4/19/22 with MD Crystal*  Next Appointment: Claudette Patron to follow-up in 3 weeks  Previous Refill Encounter(s): 4/8/22 #60    Requested Prescriptions     Pending Prescriptions Disp Refills    HYDROcodone-acetaminophen (NORCO) 7.5-325 mg per tablet 60 Tablet 0     Sig: Take 1 Tablet by mouth every four (4) hours as needed (post-op pain) for up to 10 days. Max Daily Amount: 6 Tablets.

## 2022-05-23 ENCOUNTER — HOSPITAL ENCOUNTER (OUTPATIENT)
Dept: PHYSICAL THERAPY | Age: 57
End: 2022-05-23
Attending: ORTHOPAEDIC SURGERY
Payer: COMMERCIAL

## 2022-05-23 ENCOUNTER — TELEPHONE (OUTPATIENT)
Dept: PHYSICAL THERAPY | Age: 57
End: 2022-05-23

## 2022-05-23 RX ORDER — HYDROCODONE BITARTRATE AND ACETAMINOPHEN 7.5; 325 MG/1; MG/1
1 TABLET ORAL
Qty: 60 TABLET | Refills: 0 | Status: SHIPPED | OUTPATIENT
Start: 2022-05-23 | End: 2022-06-02

## 2022-05-25 ENCOUNTER — HOSPITAL ENCOUNTER (OUTPATIENT)
Dept: PHYSICAL THERAPY | Age: 57
Discharge: HOME | End: 2022-05-25
Attending: ORTHOPAEDIC SURGERY
Payer: COMMERCIAL

## 2022-05-25 PROCEDURE — 97110 THERAPEUTIC EXERCISES: CPT

## 2022-05-25 PROCEDURE — 97112 NEUROMUSCULAR REEDUCATION: CPT

## 2022-05-25 PROCEDURE — 97140 MANUAL THERAPY 1/> REGIONS: CPT

## 2022-05-25 NOTE — PROGRESS NOTES
PT DAILY TREATMENT NOTE     Patient Name: Lenin Cottrell  Date:2022  : 1965  [x]  Patient  Verified  Payor: BLUE CROSS / Plan: [address] [place] [address] / Product Type: PPO /      In time:3:31  Out time:4:24  Total Treatment Time (min): 48  Visit #: 4 of 12    Medicare/BCBS Only   Total Timed Codes (min):  43 1:1 Treatment Time: 43       Treatment Area: Left knee pain [M25.562]    SUBJECTIVE  Pain Level (0-10 scale): 0  Any medication changes, allergies to medications, adverse drug reactions, diagnosis change, or new procedure performed?: [x] No    [] Yes (see summary sheet for update)  Subjective functional status/changes:   [] No changes reported  Pt reports having surgery performed for a Hemorid . Pt reports he has precautions against lifting following surgery. Pt reports decreased swelling in left knee and having no pain since previous visit. Pt reports he has increased performance of ADLs with moderate difficulty but is pain free when performing them. Pt reports continued difficulty with ADLs is due to stiffness of uninvolved Right knee. OBJECTIVE    Modality rationale: decrease edema and decrease pain to improve the patients ability to perform ADLs. Min Type Additional Details   10 [x]  Ice     []  heat  []  Ice massage  []  Laser   []  Anodyne Position: Seated  Location: Left knee     23 min Therapeutic Exercise:  [x] See flow sheet : Emphasis placed on increasing left knee ROM and extension strength. Rationale: increase ROM, increase strength and improve coordination to improve the patients ability to improve ease with household ADLs. 10 min Neuromuscular Re-education:  [x]  See flow sheet :Emphasis placed on improving activation and recruitment of the quadriceps, gluteal, and hamstring musculature during functional activities and improving LE proprioceptive and kinesthetic awareness.    Rationale: increase ROM, increase strength, improve coordination, improve balance and increase proprioception  to improve the patients ability to safely perform ADLs and ambulation through the community. 10 min Manual Therapy:    Supine, Left Femur AP Grade III Mobilization)  Supine, Left Knee Extension Passive Physiological Grade III Mobilization   The manual therapy interventions were performed at a separate and distinct time from the therapeutic activities interventions. Rationale: decrease pain, increase ROM and increase tissue extensibility to improve ease with stair management        With   [] TE   [] TA   [] neuro   [] other: Patient Education: [x] Review HEP    [] Progressed/Changed HEP based on:   [] positioning   [] body mechanics   [] transfers   [] heat/ice application    [] other:      Other Objective/Functional Measures:   AROM Left Knee =  0-122 deg    Pain Level (0-10 scale) post treatment: 0    ASSESSMENT/Changes in Function:     Patient has made significant progress with knee ROM and achieved functional goals. Today's session emphasized continued strengthening of knee flexion and extension with incorporation of functional activities. Patient tolerated progressed ther-ex well and reported strong desire to continue progressing plan of care. Patient with recent Hemorroid surgery with lifting precautions limiting further progression of lifting related activities. Next session will further progress knee strength and single-leg balance. Functional lifting will be deferred till precautions of surgery are removed. Patient will continue to benefit from skilled PT services to modify and progress therapeutic interventions, address functional mobility deficits, address ROM deficits, address strength deficits, analyze and cue movement patterns and analyze and modify body mechanics/ergonomics to attain remaining goals.      [x]  See Plan of Care  []  See progress note/recertification  []  See Discharge Summary         Progress towards goals / Updated goals:  Short Term Goals: To be accomplished in 1 weeks:  1.  Patient will become proficient in their HEP and will be compliant in performing that program.  Evaluation:   Patient given a written/illustrated HEP. Current:  Patient reports compliance with his HEP.  5/16/2022.  Goal Met. 2.  Patient will achieve 0-100 degrees PROM, left knee, in order to increase his functional mobility. Evaluation:  PROM 8-82 degrees  Current:  PROM 0-115 degrees.  5/16/2022.  Goal Met.     Long Term Goals: To be accomplished in 4 weeks:  1. Patient's pain level will be 0-3/10 with activity in order to improve patient's ability to perform normal ADLs. Evaluation:  0/10-10/10  Current: Patient notes he has not had pain in the past week.  5/16/2022.  Goal Met.  2. Patient will demonstrate 0-120 degrees AROM left knee to increase ease of ADLs. Evaluation:  AROM left knee 10-72 degrees. Current:  AROM left knee 0-122 degrees.  05/25/2022.  Goal Met.  3. Patient will increase FOTO score to 65 to indicate increased functional mobility. Evaluation:  43  4. Patient will report little difficulty with light activities around his home in order to increase his functional ADLs. .  Evaluation:  Notes moderate difficulty. Current: Progressing, Notes moderate difficulty but still \"much better\" than when first evaluated.  05/25/2022      PLAN  [x]  Upgrade activities as tolerated     [x]  Continue plan of care  []  Update interventions per flow sheet       []  Discharge due to:_  []  Other:_      Ahsan Valdes, LULY 5/25/2022  10:32 AM    Future Appointments   Date Time Provider Manoj Caruso   5/25/2022  3:30 PM Svitlana Salcedo [facility]   5/31/2022  3:30 PM Cong Goetz PT [facility]   6/2/2022  3:30 PM Cong Goetz PT [facility]   6/6/2022  2:45 PM Cong Goetz PT [facility]   6/8/2022  3:30 PM Margo Salcedo, PT [facility]

## 2022-05-31 ENCOUNTER — HOSPITAL ENCOUNTER (OUTPATIENT)
Dept: PHYSICAL THERAPY | Age: 57
Discharge: HOME | End: 2022-05-31
Attending: ORTHOPAEDIC SURGERY
Payer: COMMERCIAL

## 2022-05-31 PROCEDURE — 97110 THERAPEUTIC EXERCISES: CPT | Performed by: PHYSICAL THERAPIST

## 2022-05-31 PROCEDURE — 97112 NEUROMUSCULAR REEDUCATION: CPT | Performed by: PHYSICAL THERAPIST

## 2022-05-31 PROCEDURE — 97140 MANUAL THERAPY 1/> REGIONS: CPT | Performed by: PHYSICAL THERAPIST

## 2022-05-31 NOTE — PROGRESS NOTES
[facility]              [address] [facility]        [place], [address]  [phone] fax    Discharge Summary  Patient name: Ranulfo Potts Start of Care: 5/3/2022   Referral source: Adabindu Dover Arianfarhatma : 1965   Medical/Treatment Diagnosis: Left knee pain [M25.562]  Payor: LENIN BOB / Plan: [address] [place] [address] / Product Type: PPO /  Onset Date:2022     Prior Hospitalization: see medical history Provider#: [id number]   Medications: Verified on Patient Summary List    Comorbidities: Arthritis, Back Pain, HBP, Osteoporosis, prior surgery. Prior Level of Function: Walking for exercise, Independent activity level. States he is disabled. Visits from Start of Care: 5    Missed Visits: 2  Reporting Period : 5/3/2022 to 2022    Summary of Care:  Short Term Goals: To be accomplished in 1 weeks:  1.  Patient will become proficient in their HEP and will be compliant in performing that program.  Evaluation:   Patient given a written/illustrated HEP. Current:  Patient reports compliance with his HEP.  2022.  Goal Met. 2.  Patient will achieve 0-100 degrees PROM, left knee, in order to increase his functional mobility. Evaluation:  PROM 8-82 degrees  Current:  PROM 0-115 degrees.  2022.  Goal Met.     Long Term Goals: To be accomplished in 4 weeks:  1. Patient's pain level will be 0-3/10 with activity in order to improve patient's ability to perform normal ADLs. Evaluation:  0/10-10/10  Current: Patient notes he has not had pain in the past week.  2022.  Goal Met.  2. Patient will demonstrate 0-120 degrees AROM left knee to increase ease of ADLs. Evaluation:  AROM left knee 10-72 degrees. Current:  AROM left knee 0-126 degrees.  2022.  Goal Met.  3. Patient will increase FOTO score to 65 to indicate increased functional mobility. Evaluation:  43  Current:  FOTO = 91.  2022.   Goal Met.  4. Patient Oren Perkins little difficulty with light activities around his home in order to increase his functional ADLs. .  Evaluation:  Notes moderate difficulty. Current: Notes no difficulty with light activities at home. 5/31/2022. Goal Met.     ASSESSMENT/RECOMMENDATIONS:  [x]Discontinue therapy: [x]Patient has reached or is progressing toward set goals      []Patient is non-compliant or has abdicated      []Due to lack of appreciable progress towards set goals    Sol Epstein, JONNY 5/31/2022 6:03 PM

## 2022-05-31 NOTE — PROGRESS NOTES
PT DAILY TREATMENT NOTE     Patient Name: Rizwan Alvarez  Date:2022  : 1965  [x]  Patient  Verified  Payor: BLUE CROSS / Plan: [address] [place] [address] / Product Type: PPO /    In time:3:32  Out time:4:15  Total Treatment Time (min): 43  Visit #: 5 of 12    Medicare/BCBS Only   Total Timed Codes (min):  43 1:1 Treatment Time:  43       Treatment Area: Left knee pain [M25.562]    SUBJECTIVE  Pain Level (0-10 scale): 0  Any medication changes, allergies to medications, adverse drug reactions, diagnosis change, or new procedure performed?: [x] No    [] Yes (see summary sheet for update)  Subjective functional status/changes:   [] No changes reported  Patient reports he has been doing well. States he is doing some light work at home. Was also able to move some furniture. OBJECTIVE      23 min Therapeutic Exercise:  [x]? See flow sheet : Emphasis placed on increasing left knee ROM and extension strength. Rationale: increase ROM, increase strength and improve coordination to improve the patients ability to improve ease with household ADLs.    10 min Neuromuscular Re-education:  [x]? See flow sheet :Emphasis placed on improving activation and recruitment of the quadriceps, gluteal, and hamstring musculature during functional activities and improving LE proprioceptive and kinesthetic awareness. Rationale: increase ROM, increase strength, improve coordination, improve balance and increase proprioception  to improve the patients ability to safely perform ADLs and ambulation through the community.     10 min Manual Therapy:    Supine, Left Femur AP Grade III Mobilization)  Supine, Left Knee Extension Passive Physiological Grade III Mobilization   The manual therapy interventions were performed at a separate and distinct time from the therapeutic activities interventions.   Rationale: decrease pain, increase ROM and increase tissue extensibility to improve ease with stair management With   [] TE   [] TA   [] neuro   [] other: Patient Education: [x] Review HEP    [] Progressed/Changed HEP based on:   [] positioning   [] body mechanics   [] transfers   [] heat/ice application    [] other:      Other Objective/Functional Measures: Gait without deviation. AROM 0-126 degrees. Pain Level (0-10 scale) post treatment: 0    ASSESSMENT/Changes in Function: Patient has met all long term and short term goals. Notes he has no functional limitations at this time but has not tried doing some activities. Patient will continue to benefit from skilled PT services to modify and progress therapeutic interventions, address functional mobility deficits, address ROM deficits, address strength deficits, analyze and cue movement patterns and analyze and modify body mechanics/ergonomics to attain remaining goals. [x]  See Plan of Care  []  See progress note/recertification  []  See Discharge Summary         Progress towards goals / Updated goals:  Short Term Goals: To be accomplished in 1 weeks:  1.  Patient will become proficient in their HEP and will be compliant in performing that program.  Evaluation:   Patient given a written/illustrated HEP. Current:  Patient reports compliance with his HEP.  5/16/2022.  Goal Met. 2.  Patient will achieve 0-100 degrees PROM, left knee, in order to increase his functional mobility. Evaluation:  PROM 8-82 degrees  Current:  PROM 0-115 degrees.  5/16/2022.  Goal Met.     Long Term Goals: To be accomplished in 4 weeks:  1. Patient's pain level will be 0-3/10 with activity in order to improve patient's ability to perform normal ADLs. Evaluation:  0/10-10/10  Current: Patient notes he has not had pain in the past week.  5/16/2022.  Goal Met.  2. Patient will demonstrate 0-120 degrees AROM left knee to increase ease of ADLs. Evaluation:  AROM left knee 10-72 degrees.   Current:  AROM left knee 0-126 degrees.  05/31/2022.  Goal Met.  3. Patient will increase FOTO score to 65 to indicate increased functional mobility. Evaluation:  43  Current:  FOTO = 91.  5/31/2022. Goal Met.  4. Patient will report little difficulty with light activities around his home in order to increase his functional ADLs. .  Evaluation:  Notes moderate difficulty. Current: Notes no difficulty with light activities at home. 5/31/2022. Goal Met.     PLAN  [x]  Upgrade activities as tolerated     [x]  Continue plan of care  []  Update interventions per flow sheet       []  Discharge due to:_  []  Other:_      Nita Castano, PT 5/31/2022  11:26 AM    Future Appointments   Date Time Provider Manoj Caruso   5/31/2022  3:30 PM Richard Paz, PT [facility]   6/2/2022  3:30 PM Richard Paz, PT [facility]   6/6/2022  2:45 PM Richard Paz, PT [facility]   6/8/2022  3:30 PM Reyes Salcedo, PT [facility]

## 2022-06-02 ENCOUNTER — APPOINTMENT (OUTPATIENT)
Dept: PHYSICAL THERAPY | Age: 57
End: 2022-06-02
Attending: ORTHOPAEDIC SURGERY

## 2022-06-06 ENCOUNTER — APPOINTMENT (OUTPATIENT)
Dept: PHYSICAL THERAPY | Age: 57
End: 2022-06-06
Attending: ORTHOPAEDIC SURGERY

## 2022-06-08 ENCOUNTER — APPOINTMENT (OUTPATIENT)
Dept: PHYSICAL THERAPY | Age: 57
End: 2022-06-08
Attending: ORTHOPAEDIC SURGERY

## 2022-12-28 ENCOUNTER — OFFICE VISIT (OUTPATIENT)
Dept: ORTHOPEDIC SURGERY | Age: 57
End: 2022-12-28
Payer: COMMERCIAL

## 2022-12-28 VITALS — HEART RATE: 90 BPM | RESPIRATION RATE: 18 BRPM | BODY MASS INDEX: 30.91 KG/M2 | OXYGEN SATURATION: 97 % | HEIGHT: 71 IN

## 2022-12-28 DIAGNOSIS — M25.512 CHRONIC LEFT SHOULDER PAIN: ICD-10-CM

## 2022-12-28 DIAGNOSIS — M75.102 ROTATOR CUFF SYNDROME OF LEFT SHOULDER: Primary | ICD-10-CM

## 2022-12-28 DIAGNOSIS — G89.29 CHRONIC LEFT SHOULDER PAIN: ICD-10-CM

## 2022-12-28 PROCEDURE — 99213 OFFICE O/P EST LOW 20 MIN: CPT | Performed by: ORTHOPAEDIC SURGERY

## 2022-12-28 PROCEDURE — 20610 DRAIN/INJ JOINT/BURSA W/O US: CPT | Performed by: ORTHOPAEDIC SURGERY

## 2022-12-28 PROCEDURE — 73030 X-RAY EXAM OF SHOULDER: CPT | Performed by: ORTHOPAEDIC SURGERY

## 2022-12-28 RX ORDER — TRIAMCINOLONE ACETONIDE 40 MG/ML
40 INJECTION, SUSPENSION INTRA-ARTICULAR; INTRAMUSCULAR ONCE
Status: COMPLETED | OUTPATIENT
Start: 2022-12-28 | End: 2022-12-28

## 2022-12-28 RX ADMIN — TRIAMCINOLONE ACETONIDE 40 MG: 40 INJECTION, SUSPENSION INTRA-ARTICULAR; INTRAMUSCULAR at 11:31

## 2022-12-28 NOTE — PROGRESS NOTES
Patient: Joby Prater                MRN: [id number]       SSN: [id number]  YOB: 1965        AGE: 61 y.o. SEX: male  Body mass index is 30.91 kg/m². PCP: MONA Rob  12/28/22    Chief Complaint: Left shoulder pain     HPI: Joby Prater is a 61 y.o. male patient who presents to the office today with several weeks of left shoulder pain. He has had intermittent left shoulder pain for several months but recently it has gotten to the point where it is not going away for the past 2 weeks. He denies any injury or trauma. This is similar to the right shoulder pain he was having that previously has undergone surgery and is done well. He has not been to physical therapy. No injections. Past Medical History:   Diagnosis Date    Arthritis     Chronic pain     knees, back, hands    Degenerative arthritis of right knee     Hypertension     Sleep apnea     no cpap       History reviewed. No pertinent family history. Current Outpatient Medications   Medication Sig Dispense Refill    diclofenac EC (VOLTAREN) 75 mg EC tablet TAKE 1 TABLET BY MOUTH TWICE A DAY WITH MEALS (Patient taking differently: as needed.) 60 Tablet 2    oxyCODONE IR (ROXICODONE) 10 mg tab immediate release tablet Take 10 mg by mouth every six (6) hours as needed. telmisartan (MICARDIS) 20 mg tablet Take 20 mg by mouth daily. amLODIPine (NORVASC) 10 mg tablet Take  by mouth daily. gabapentin (Neurontin) 300 mg capsule Take  by mouth as needed.  (Patient not taking: Reported on 4/14/2022)       Current Facility-Administered Medications   Medication Dose Route Frequency Provider Last Rate Last Admin    triamcinolone acetonide (KENALOG-40) 40 mg/mL injection 40 mg  40 mg Intra artICUlar ONCE Alirio Crystal MD           Allergies   Allergen Reactions    Latex Rash    Latex, Natural Rubber Hives and Unknown (comments)    Tramadol Nausea Only       Past Surgical History:   Procedure Laterality Date    HX KNEE ARTHROSCOPY Right     HX LUMBAR DISKECTOMY      x 2    HX ORTHOPAEDIC      elbow ligament repair    HX ROTATOR CUFF REPAIR Right     NE ANESTH,SURGERY OF SHOULDER Right        Social History     Socioeconomic History    Marital status:      Spouse name: Not on file    Number of children: Not on file    Years of education: Not on file    Highest education level: Not on file   Occupational History    Not on file   Tobacco Use    Smoking status: Never    Smokeless tobacco: Never   Vaping Use    Vaping Use: Never used   Substance and Sexual Activity    Alcohol use: Yes     Alcohol/week: 0.0 standard drinks     Comment: occaisional    Drug use: Not Currently     Types: Marijuana, Cocaine    Sexual activity: Not on file   Other Topics Concern    Not on file   Social History Narrative    Not on file     Social Determinants of Health     Financial Resource Strain: Not on file   Food Insecurity: Not on file   Transportation Needs: Not on file   Physical Activity: Not on file   Stress: Not on file   Social Connections: Not on file   Intimate Partner Violence: Not on file   Housing Stability: Not on file       REVIEW OF SYSTEMS:      No changes from previous review of systems unless noted. PHYSICAL EXAMINATION:  Visit Vitals  Pulse 90   Resp 18   Ht 5' 11\" (1.803 m)   SpO2 97%   BMI 30.91 kg/m²     Body mass index is 30.91 kg/m². GENERAL: Alert and oriented x3, in no acute distress. HEENT: Normocephalic, atraumatic.     Shoulder Examination     R   L  ROM   FF  Full   Full  ER  Full   Full   IR  Full   Full  Rotator Cuff Pain   Supra  -   +   Infra  -   -   Subscap -   -  Crepitus  -   -  Effusion  -   -  Warmth  -   -   Erythema  -   -  Instability  -   -  AC Joint TTP  -   -  Clavicle   Deformity -   -   TTP  -   -  Proximal Humerus   Deformity -   -   TTP  -   -  Deltoid Strength 5   5  Biceps Strength 5   5  Biceps Deformity -   -  Biceps Groove Pain -   -  Impingement Sign -   -       IMAGING:  Imaging read by myself and interpreted as follows:  X-rays 4 views left shoulder taken the office today which do not show any acute or chronic bony abnormalities. ASSESSMENT & PLAN  Diagnosis: Left shoulder rotator cuff pain    Elke Peña has left shoulder pain likely coming from his rotator cuff. This has been persistent for several weeks. We discussed the treatment options for this today at length including the possibility of an MRI but we will hold off on this for now. He would like to try a corticosteroid injection and home exercise program along with continuing anti-inflammatories. I will see him back in 6 to 8 weeks if his pain persist.    Prescription medication management discussed with patient. [facility]  OFFICE PROCEDURE PROGRESS NOTE        Chart reviewed for the following:   Boaz Buckley MD, have reviewed the History, Physical and updated the Allergic reactions for [address] performed immediately prior to start of procedure:   Boaz Buckley MD, have performed the following reviews on [place] prior to the start of the procedure:            * Patient was identified by name and date of birth   * Agreement on procedure being performed was verified  * Risks and Benefits explained to the patient  * Procedure site verified and marked as necessary  * Patient was positioned for comfort  * Consent was signed and verified    Time: 11:31 AM    Location: Left shoulder joint subacromial space injection    Kenalog 40mg & 3cc Lidocaine    Date of procedure: 12/28/2022    Procedure performed by:  Gia Valdez MD    Provider assisted by:  In Office MA    Patient assisted by: self    How tolerated by patient: tolerated the procedure well with no complications    Post Procedural Pain Scale: 0 - No Hurt    Comments: none      Electronically signed by: Gia Valdez MD    Note: This note was completed using voice recognition software.   Any typographical/name errors or mistakes are unintentional.

## 2023-04-05 ENCOUNTER — OFFICE VISIT (OUTPATIENT)
Age: 58
End: 2023-04-05

## 2023-04-05 VITALS — WEIGHT: 229 LBS | HEIGHT: 71 IN | BODY MASS INDEX: 32.06 KG/M2

## 2023-04-05 DIAGNOSIS — M54.12 CERVICAL RADICULOPATHY: Primary | ICD-10-CM

## 2023-04-05 NOTE — PROGRESS NOTES
multilevel spondylolisthesis but no acute bony injuries. ASSESSMENT & PLAN  Diagnosis: Cervical radiculopathy, left arm    I believe that Kevin's complaints today are mostly related to his numbness and tingling in his arm which is likely coming from his neck. I have recommended follow-up with our spine service for further evaluation and management. His rotator cuff is strong today. I do not see any pain emanating from his shoulder at this time. I discussed this plan with him and I referred him to our neck and spine service. Prescription medication management discussed with patient. Plan was discussed in detail with patient, all questions were answered, and patient voiced understanding of plan. Electronically signed by: Jose Daniel Laguna MD     Note: This note was completed using voice recognition software.   Any typographical/name errors or mistakes are unintentional.

## 2023-04-24 ENCOUNTER — OFFICE VISIT (OUTPATIENT)
Age: 58
End: 2023-04-24
Payer: COMMERCIAL

## 2023-04-24 VITALS
TEMPERATURE: 97.6 F | HEIGHT: 71 IN | RESPIRATION RATE: 16 BRPM | HEART RATE: 101 BPM | BODY MASS INDEX: 32.06 KG/M2 | OXYGEN SATURATION: 95 % | WEIGHT: 229 LBS

## 2023-04-24 DIAGNOSIS — M50.30 DDD (DEGENERATIVE DISC DISEASE), CERVICAL: ICD-10-CM

## 2023-04-24 DIAGNOSIS — M25.561 ACUTE PAIN OF RIGHT KNEE: ICD-10-CM
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
## 2023-04-24 DIAGNOSIS — R26.9 GAIT ABNORMALITY: ICD-10-CM

## 2023-04-24 DIAGNOSIS — M54.12 CERVICAL RADICULITIS: Primary | ICD-10-CM

## 2023-04-24 DIAGNOSIS — S16.1XXA STRAIN OF NECK MUSCLE, INITIAL ENCOUNTER: ICD-10-CM

## 2023-04-24 DIAGNOSIS — M62.838 MUSCLE SPASM: ICD-10-CM

## 2023-04-24 PROCEDURE — 99203 OFFICE O/P NEW LOW 30 MIN: CPT | Performed by: PHYSICAL MEDICINE & REHABILITATION

## 2023-04-24 RX ORDER — GABAPENTIN 300 MG/1
CAPSULE ORAL
Qty: 90 CAPSULE | Refills: 1 | Status: SHIPPED | OUTPATIENT
Start: 2023-04-24 | End: 2023-07-20

## 2023-04-24 RX ORDER — GABAPENTIN 300 MG/1
CAPSULE ORAL
Qty: 90 CAPSULE | Refills: 1 | Status: SHIPPED | OUTPATIENT
Start: 2023-04-24 | End: 2023-04-24 | Stop reason: SDUPTHER

## 2023-04-24 RX ORDER — DICLOFENAC SODIUM 75 MG/1
75 TABLET, DELAYED RELEASE ORAL 2 TIMES DAILY WITH MEALS
Qty: 60 TABLET | Refills: 1 | Status: SHIPPED | OUTPATIENT
Start: 2023-04-24

## 2023-04-24 NOTE — PROGRESS NOTES
Chief Complaint   Patient presents with    New Patient       Pt preferred language for health care discussion is english. Is someone accompanying this pt? no    Is the patient using any DME equipment during OV? no    Depression Screening:  No flowsheet data found. Learning Assessment:  No flowsheet data found. Abuse Screening:  No flowsheet data found. Fall Risk  No flowsheet data found. Advance Directive:  1. Do you have an advance directive in place? Patient Reply:no    2. If not, would you like material regarding how to put one in place? Patient Reply: no    2. Per patient no changes to their ACP contact no. Coordination of Care:  1. Have you been to the ER, urgent care clinic since your last visit? Hospitalized since your last visit? Yes [facility] for [facility]    2. Have you seen or consulted any other health care providers outside of the [address] [place] [address] since your last visit? Include any pap smears or colon screening.  no
distress. HEENT: Normocephalic. Atraumatic. Oropharynx is moist and clear. PERRL. EOMI. Sclerae are nonicteric  Cardiovascular: Regular rate and rhythm  Lungs: Clear to auscultation bilaterally  Abdomen: Soft and nontender. Bowel sounds are present  Neurological: 1+ symmetrical DTRs in the upper extremities. 1+ symmetrical DTRs in the lower extremities. Sensation to light touch is intact. Negative Gallo's sign bilaterally. Skin: warm, dry, and intact. Musculoskeletal:  No pain with extension, axial loading, or forward flexion. No pain with internal or external rotation of his hips. Negative straight leg raise on the left patient is unable to fully extend his right knee secondary to pain. Unable to test heel and toe walking and single-leg stance secondary to severe right knee pain      Biceps  Triceps Deltoids Wrist Ext Wrist Flex Hand Intrin   Right +4/5 +4/5 +4/5 +4/5 +4/5 +4/5   Left +4/5 +4/5 +4/5 +4/5 +4/5 +4/5      Hip Flex  Quads Hamstrings Ankle DF EHL Ankle PF   Right +4/5  4/5  4/5 +4/5 +4/5 +4/5   Left +4/5 +4/5 +4/5 +4/5 +4/5 +4/5     IMAGING:    Cervical CT w/o contrast on 03/17/2023 was personally reviewed with the patient and demonstrated:  Impression      Mild degenerative changes without evidence of acute fracture or traumatic subluxation of the cervical spine. Note that this cervical spine CT was performed supine. Although it is highly sensitive for fractures, it does not evaluate for ligamentous injury or instability. If the patient has persistent symptoms or if otherwise clinically indicated, erect plain film evaluation or MRI should be performed. Knee x-rays 04/06/2023 were personally reviewed and demonstrated:  KNEE COMPLETE RT 4 VIEWS    Anatomical Region Laterality Modality   Lower Extremity -- Computed Radiography   Knee -- --     Impression      1. No acute osseous abnormality.

## 2023-05-09 ENCOUNTER — TELEPHONE (OUTPATIENT)
Age: 58
End: 2023-05-09
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
## 2023-05-09 DIAGNOSIS — M50.30 DDD (DEGENERATIVE DISC DISEASE), CERVICAL: ICD-10-CM

## 2023-05-09 DIAGNOSIS — M62.838 MUSCLE SPASM: ICD-10-CM

## 2023-05-09 DIAGNOSIS — M54.12 CERVICAL RADICULITIS: ICD-10-CM

## 2023-05-09 DIAGNOSIS — S16.1XXA STRAIN OF NECK MUSCLE, INITIAL ENCOUNTER: ICD-10-CM

## 2023-05-09 NOTE — TELEPHONE ENCOUNTER
Patient called stating he would like his MRI order to go to [facility].     Please advise patient

## 2023-06-08 ENCOUNTER — OFFICE VISIT (OUTPATIENT)
Age: 58
End: 2023-06-08

## 2023-06-08 VITALS
WEIGHT: 228 LBS | TEMPERATURE: 97.2 F | HEIGHT: 71 IN | HEART RATE: 86 BPM | OXYGEN SATURATION: 96 % | BODY MASS INDEX: 31.92 KG/M2

## 2023-06-08 DIAGNOSIS — M48.02 CERVICAL SPINAL STENOSIS: ICD-10-CM

## 2023-06-08 DIAGNOSIS — R26.9 GAIT ABNORMALITY: ICD-10-CM

## 2023-06-08 DIAGNOSIS — M54.12 CERVICAL RADICULITIS: Primary | ICD-10-CM

## 2023-06-08 DIAGNOSIS — R29.2 HYPER REFLEXIA: ICD-10-CM

## 2023-06-08 RX ORDER — TRAMADOL HYDROCHLORIDE 50 MG/1
50 TABLET ORAL EVERY 6 HOURS PRN
COMMUNITY
Start: 2023-05-25

## 2023-06-08 RX ORDER — OXYCODONE HYDROCHLORIDE AND ACETAMINOPHEN 5; 325 MG/1; MG/1
1 TABLET ORAL EVERY 6 HOURS PRN
COMMUNITY
Start: 2023-04-06

## 2023-06-08 RX ORDER — KETOROLAC TROMETHAMINE 30 MG/ML
60 INJECTION, SOLUTION INTRAMUSCULAR; INTRAVENOUS EVERY 6 HOURS PRN
Status: SHIPPED | OUTPATIENT
Start: 2023-06-08 | End: 2023-06-13

## 2023-06-08 RX ORDER — TOPIRAMATE 25 MG/1
TABLET ORAL
Qty: 90 TABLET | Refills: 1 | Status: SHIPPED | OUTPATIENT
Start: 2023-06-08

## 2023-06-08 RX ORDER — METHYLPREDNISOLONE 4 MG/1
TABLET ORAL
Qty: 1 KIT | Refills: 0 | Status: SHIPPED | OUTPATIENT
Start: 2023-06-08 | End: 2023-06-14

## 2023-06-08 RX ORDER — ERGOCALCIFEROL 1.25 MG/1
CAPSULE ORAL
COMMUNITY
Start: 2023-06-05

## 2023-06-08 RX ADMIN — KETOROLAC TROMETHAMINE 60 MG: 30 INJECTION, SOLUTION INTRAMUSCULAR; INTRAVENOUS at 13:21

## 2023-06-08 NOTE — PROGRESS NOTES
Alec Sweeney presents today for   Chief Complaint   Patient presents with    Neck Pain       Is someone accompanying this pt? no    Is the patient using any DME equipment during OV? Yes, cane    Coordination of Care:  1. Have you been to the ER, urgent care clinic since your last visit? no  Hospitalized since your last visit? no    2. Have you seen or consulted any other health care providers outside of the [address] [place] [address] since your last visit? Yes, Ortho Include any pap smears or colon screening.  no
Consent was explained to the pt and signed. No questions or concerns voiced at this time. Pt given 60mg/2ml of toradol IM in right gluteus. No sign or symptoms of infection noted at injection site. There was no bleeding, swelling or leaking noted after injection. Pt handed injection information sheet to take home.
significant additional areas of spondylolisthesis. There is no evidence of ligamentous injury. The spinal canal and foramina are well maintained from the skull base to C3-4. At C4-5, there is central disc osteophyte complex with mild central canal stenosis. The foramina are patent. At C5-6, there is diffuse disc osteophyte complex which effaces the thecal sac and mildly impinges upon the anterior aspect of the cervical cord. Central canal stenosis is of mild-to-moderate severity. Uncovertebral osteophytic spurring and hypertrophic facet changes contribute to mild biforaminal narrowing. At C5-6, there is diffuse disc osteophyte complex which effaces the thecal sac and mildly impinges upon the anterior aspect of the cervical cord. Central canal stenosis is of mild-to-moderate severity. Uncovertebral osteophytic spurring and hypertrophic facet changes contribute to mild biforaminal narrowing. At C6-7, there is diffuse disc osteophyte complex which is asymmetric to the left. This mildly impinges upon the left side of the cervical cord. Central canal stenosis is of mild-to-moderate severity. Mild right and severe left foraminal narrowing are present. At C7-T1, the spinal canal and foramina are well maintained. There is no abnormal signal in the cervical cord. The cerebellar tonsils are appropriately positioned above the level of the foramen magnum. Cervical CT w/o contrast on 03/17/2023 was personally reviewed with the patient and demonstrated:  Impression       Mild degenerative changes without evidence of acute fracture or traumatic subluxation of the cervical spine. Note that this cervical spine CT was performed supine. Although it is highly sensitive for fractures, it does not evaluate for ligamentous injury or instability. If the patient has persistent symptoms or if otherwise clinically indicated, erect plain film evaluation or MRI should be performed.        Knee x-rays 04/06/2023

## 2023-06-30 DIAGNOSIS — M54.12 CERVICAL RADICULITIS: ICD-10-CM

## 2023-06-30 RX ORDER — NALOXONE HYDROCHLORIDE 4 MG/.1ML
SPRAY NASAL
COMMUNITY
Start: 2023-06-22

## 2023-06-30 RX ORDER — TOPIRAMATE 25 MG/1
TABLET ORAL
Qty: 90 TABLET | Refills: 1 | OUTPATIENT
Start: 2023-06-30

## 2023-06-30 NOTE — TELEPHONE ENCOUNTER
Requested Prescriptions     Pending Prescriptions Disp Refills    topiramate (TOPAMAX) 25 MG tablet [Pharmacy Med Name: TOPIRAMATE 25 MG TABLET] 90 tablet 1     Sig: TAKE 1 TABLET BY MOUTH IN THE EVENING FOR 1 WEEK, THEN INCREASE TO 2 TABLETS THE SECOND WEEK, AND CONTINUE WITH 3 TABLETS IN THE EVENING      Pharmacy Request    Patient was last seen on 6/8/23. Patient's next appointment 7/27/23. Patient's choice of pharmacy [facility], [place]. The refill request's last refill info on 6/8/23 Qty 90 w/1 refill. Patient was referred to Dr. Indira Hernandez based off last office note.

## 2023-07-27 NOTE — H&P
Pre-Admission History and Physical    Patient: Emanuel Desouza   MRN: [id number]   SSN: [id number]   YOB: 1965   Age: 60 y.o. Sex: male     Patient scheduled for: anterior cervical decompression and fusion cervical five/six, cervical six/seven. Date of surgery: 7/31/2023. Surgeon: Feliberto Giron MD    HPI:  Emanuel Desouza is a 60 y.o. male with severe neck and bilateral arm pain, numbness and weakness. He finds his balance is off and his dexterity is affected. Hermenia Mannheim He reports a pain level of 7/10. MRI demonstrates cervical spondylosis with stenosis with cord compression secondary to disc herniation at C5/6 and C6/7. This patient has failed the presurgical conservative treatments  including physical therapy, spinal block injections and medications. Pain has impacted the patient's functional ability. He is being admitted for surgical intervention. Past Medical History:   Diagnosis Date    Arthritis     Chronic pain     knees, back, hands    Degenerative arthritis of right knee     Hypertension     Sleep apnea     no cpap     Social History     Socioeconomic History    Marital status:      Spouse name: None    Number of children: None    Years of education: None    Highest education level: None   Tobacco Use    Smoking status: Never    Smokeless tobacco: Never   Vaping Use    Vaping Use: Never used   Substance and Sexual Activity    Alcohol use: Not Currently    Drug use: Not Currently     Types: Cocaine, Marijuana Leida Mclean)     Past Surgical History:   Procedure Laterality Date    ANESTH,SURGERY OF SHOULDER Right     KNEE ARTHROSCOPY Right     LUMBAR DISCECTOMY      x 2    ORTHOPEDIC SURGERY      elbow ligament repair    ROTATOR CUFF REPAIR Right      History reviewed. No pertinent family history.   Allergies   Allergen Reactions    Latex Hives and Rash     Other reaction(s): Unknown (comments)    Gabapentin (Once-Daily) Other (See Comments)     SLEEP    Tramadol Nausea Only     No

## 2023-07-28 ENCOUNTER — ANESTHESIA EVENT (OUTPATIENT)
Facility: HOSPITAL | Age: 58
End: 2023-07-28
Payer: COMMERCIAL

## 2023-07-28 ENCOUNTER — TELEPHONE (OUTPATIENT)
Facility: HOSPITAL | Age: 58
End: 2023-07-28

## 2023-07-28 NOTE — TELEPHONE ENCOUNTER
Call placed to patient, ID verified x 2. Patient  has decided with their surgeon to have*cervical surgery to decrease  pain and improve mobility . Topics discussed included surgery preparation, what to expect the day of surgery, medications, physical and occupational therapy, and discharge planning. It was discussed that this is considered an elective surgery and that prior to the surgery  decisions such as arranging for help at home once they are discharged needs to be made. Patient agreed to get home ready for surgery and to have a ride arranged to go home. He will pack an overnight back in the event that he is spending th evening as it is a later surgery. Instructions were given for CHG bathing. Patient states that he will be showering with dial soap. Patient will complete the procedure the morning of surgery. Patient was reminded not to apply any deoderant, or lotions to skin the morning of surgery. He will remain NPO after midnight the night before surgery and  will take only the medications as instructed to take by his  surgeon the morning of surgery with a sip of water. Patient was instructed to take his amlodipine the morning of surgery with a sip of water. A spine surgery education book will be provided to patient post op prior to discharge. Education regarding the importance of early and frequent ambulation to avoid surgical complications and to assist with pain was provided. Recommended the use of ice to assist with pain and swelling post op for 20 minutes an hour, not to be placed directly on his skin. Encouraged patient to sleep with head elevated for the first few days. Reminded patient that soft collar is for comfort only. Patient verbalized understanding of all information provided. Opportunity was given to ask questions and phone number of the Orthopaedic   was given for any questions or concerns that may arise later.

## 2023-07-31 ENCOUNTER — APPOINTMENT (OUTPATIENT)
Facility: HOSPITAL | Age: 58
End: 2023-07-31
Attending: ORTHOPAEDIC SURGERY
Payer: COMMERCIAL

## 2023-07-31 ENCOUNTER — HOSPITAL ENCOUNTER (OUTPATIENT)
Facility: HOSPITAL | Age: 58
Setting detail: OBSERVATION
Discharge: HOME | End: 2023-08-01
Attending: ORTHOPAEDIC SURGERY | Admitting: ORTHOPAEDIC SURGERY
Payer: COMMERCIAL

## 2023-07-31 ENCOUNTER — ANESTHESIA (OUTPATIENT)
Facility: HOSPITAL | Age: 58
End: 2023-07-31
Payer: COMMERCIAL

## 2023-07-31 DIAGNOSIS — Z98.1 S/P CERVICAL SPINAL FUSION: Primary | ICD-10-CM

## 2023-07-31 PROBLEM — G95.9 CERVICAL MYELOPATHY (HCC): Status: ACTIVE | Noted: 2023-07-31

## 2023-07-31 LAB
AMPHET UR QL SCN: NEGATIVE
BARBITURATES UR QL SCN: NEGATIVE
BENZODIAZ UR QL: NEGATIVE
CANNABINOIDS UR QL SCN: NEGATIVE
COCAINE UR QL SCN: NEGATIVE
Lab: ABNORMAL
METHADONE UR QL: POSITIVE
OPIATES UR QL: NEGATIVE
PCP UR QL: NEGATIVE

## 2023-07-31 PROCEDURE — 2580000003 HC RX 258: Performed by: NURSE ANESTHETIST, CERTIFIED REGISTERED

## 2023-07-31 PROCEDURE — 6360000002 HC RX W HCPCS: Performed by: ANESTHESIOLOGY

## 2023-07-31 PROCEDURE — 3700000000 HC ANESTHESIA ATTENDED CARE: Performed by: ORTHOPAEDIC SURGERY

## 2023-07-31 PROCEDURE — L0120 CERV FLEX N/ADJ FOAM PRE OTS: HCPCS | Performed by: ORTHOPAEDIC SURGERY

## 2023-07-31 PROCEDURE — 2500000003 HC RX 250 WO HCPCS: Performed by: ORTHOPAEDIC SURGERY

## 2023-07-31 PROCEDURE — 6360000002 HC RX W HCPCS: Performed by: NURSE ANESTHETIST, CERTIFIED REGISTERED

## 2023-07-31 PROCEDURE — G0378 HOSPITAL OBSERVATION PER HR: HCPCS

## 2023-07-31 PROCEDURE — 6360000002 HC RX W HCPCS: Performed by: PHYSICIAN ASSISTANT

## 2023-07-31 PROCEDURE — 3600000012 HC SURGERY LEVEL 2 ADDTL 15MIN: Performed by: ORTHOPAEDIC SURGERY

## 2023-07-31 PROCEDURE — A4216 STERILE WATER/SALINE, 10 ML: HCPCS | Performed by: ORTHOPAEDIC SURGERY

## 2023-07-31 PROCEDURE — C1729 CATH, DRAINAGE: HCPCS | Performed by: ORTHOPAEDIC SURGERY

## 2023-07-31 PROCEDURE — 3700000001 HC ADD 15 MINUTES (ANESTHESIA): Performed by: ORTHOPAEDIC SURGERY

## 2023-07-31 PROCEDURE — 6370000000 HC RX 637 (ALT 250 FOR IP): Performed by: ORTHOPAEDIC SURGERY

## 2023-07-31 PROCEDURE — 2500000003 HC RX 250 WO HCPCS: Performed by: NURSE ANESTHETIST, CERTIFIED REGISTERED

## 2023-07-31 PROCEDURE — 2700000000 HC OXYGEN THERAPY PER DAY

## 2023-07-31 PROCEDURE — 6360000002 HC RX W HCPCS: Performed by: ORTHOPAEDIC SURGERY

## 2023-07-31 PROCEDURE — 3600000002 HC SURGERY LEVEL 2 BASE: Performed by: ORTHOPAEDIC SURGERY

## 2023-07-31 PROCEDURE — 80307 DRUG TEST PRSMV CHEM ANLYZR: CPT

## 2023-07-31 PROCEDURE — C1713 ANCHOR/SCREW BN/BN,TIS/BN: HCPCS | Performed by: ORTHOPAEDIC SURGERY

## 2023-07-31 PROCEDURE — 2720000010 HC SURG SUPPLY STERILE: Performed by: ORTHOPAEDIC SURGERY

## 2023-07-31 PROCEDURE — 2709999900 HC NON-CHARGEABLE SUPPLY: Performed by: ORTHOPAEDIC SURGERY

## 2023-07-31 PROCEDURE — 2580000003 HC RX 258: Performed by: ORTHOPAEDIC SURGERY

## 2023-07-31 PROCEDURE — C1889 IMPLANT/INSERT DEVICE, NOC: HCPCS | Performed by: ORTHOPAEDIC SURGERY

## 2023-07-31 PROCEDURE — 6370000000 HC RX 637 (ALT 250 FOR IP): Performed by: NURSE ANESTHETIST, CERTIFIED REGISTERED

## 2023-07-31 PROCEDURE — 7100000000 HC PACU RECOVERY - FIRST 15 MIN: Performed by: ORTHOPAEDIC SURGERY

## 2023-07-31 PROCEDURE — 2580000003 HC RX 258: Performed by: PHYSICIAN ASSISTANT

## 2023-07-31 PROCEDURE — 7100000001 HC PACU RECOVERY - ADDTL 15 MIN: Performed by: ORTHOPAEDIC SURGERY

## 2023-07-31 DEVICE — IMPLANTABLE DEVICE: Type: IMPLANTABLE DEVICE | Site: SPINE CERVICAL | Status: FUNCTIONAL

## 2023-07-31 DEVICE — SCREW SPNL L14MM DIA4MM SELF STARTING VAR ANT CERV TI OZARK: Type: IMPLANTABLE DEVICE | Site: SPINE CERVICAL | Status: FUNCTIONAL

## 2023-07-31 RX ORDER — HYDROMORPHONE HYDROCHLORIDE 2 MG/ML
0.5 INJECTION, SOLUTION INTRAMUSCULAR; INTRAVENOUS; SUBCUTANEOUS EVERY 10 MIN PRN
Status: DISCONTINUED | OUTPATIENT
Start: 2023-07-31 | End: 2023-08-01 | Stop reason: HOSPADM

## 2023-07-31 RX ORDER — SODIUM CHLORIDE 0.9 % (FLUSH) 0.9 %
5-40 SYRINGE (ML) INJECTION EVERY 12 HOURS SCHEDULED
Status: DISCONTINUED | OUTPATIENT
Start: 2023-07-31 | End: 2023-08-01 | Stop reason: HOSPADM

## 2023-07-31 RX ORDER — ONDANSETRON 2 MG/ML
4 INJECTION INTRAMUSCULAR; INTRAVENOUS EVERY 6 HOURS PRN
Status: DISCONTINUED | OUTPATIENT
Start: 2023-07-31 | End: 2023-08-01 | Stop reason: HOSPADM

## 2023-07-31 RX ORDER — PREGABALIN 75 MG/1
75 CAPSULE ORAL ONCE
Status: COMPLETED | OUTPATIENT
Start: 2023-07-31 | End: 2023-07-31

## 2023-07-31 RX ORDER — SODIUM CHLORIDE 450 MG/100ML
INJECTION, SOLUTION INTRAVENOUS CONTINUOUS
Status: DISCONTINUED | OUTPATIENT
Start: 2023-07-31 | End: 2023-08-01 | Stop reason: HOSPADM

## 2023-07-31 RX ORDER — FENTANYL CITRATE 50 UG/ML
50 INJECTION, SOLUTION INTRAMUSCULAR; INTRAVENOUS EVERY 5 MIN PRN
Status: COMPLETED | OUTPATIENT
Start: 2023-07-31 | End: 2023-07-31

## 2023-07-31 RX ORDER — CELECOXIB 100 MG/1
100 CAPSULE ORAL ONCE
Status: COMPLETED | OUTPATIENT
Start: 2023-07-31 | End: 2023-07-31

## 2023-07-31 RX ORDER — FAMOTIDINE 20 MG/1
20 TABLET, FILM COATED ORAL ONCE
Status: COMPLETED | OUTPATIENT
Start: 2023-07-31 | End: 2023-07-31

## 2023-07-31 RX ORDER — DIPHENHYDRAMINE HCL 25 MG
25 CAPSULE ORAL EVERY 6 HOURS PRN
Status: DISCONTINUED | OUTPATIENT
Start: 2023-07-31 | End: 2023-08-01 | Stop reason: HOSPADM

## 2023-07-31 RX ORDER — GABAPENTIN 300 MG/1
300 CAPSULE ORAL 3 TIMES DAILY
Status: DISCONTINUED | OUTPATIENT
Start: 2023-07-31 | End: 2023-08-01 | Stop reason: HOSPADM

## 2023-07-31 RX ORDER — ONDANSETRON 2 MG/ML
INJECTION INTRAMUSCULAR; INTRAVENOUS PRN
Status: DISCONTINUED | OUTPATIENT
Start: 2023-07-31 | End: 2023-07-31 | Stop reason: SDUPTHER

## 2023-07-31 RX ORDER — NEOSTIGMINE METHYLSULFATE 1 MG/ML
INJECTION, SOLUTION INTRAVENOUS PRN
Status: DISCONTINUED | OUTPATIENT
Start: 2023-07-31 | End: 2023-07-31 | Stop reason: SDUPTHER

## 2023-07-31 RX ORDER — OXYCODONE HYDROCHLORIDE 5 MG/1
5 TABLET ORAL EVERY 4 HOURS PRN
Status: DISCONTINUED | OUTPATIENT
Start: 2023-07-31 | End: 2023-08-01 | Stop reason: HOSPADM

## 2023-07-31 RX ORDER — BISACODYL 5 MG/1
5 TABLET, DELAYED RELEASE ORAL DAILY
Status: DISCONTINUED | OUTPATIENT
Start: 2023-07-31 | End: 2023-08-01 | Stop reason: HOSPADM

## 2023-07-31 RX ORDER — HYDROMORPHONE HYDROCHLORIDE 2 MG/ML
0.5 INJECTION, SOLUTION INTRAMUSCULAR; INTRAVENOUS; SUBCUTANEOUS EVERY 10 MIN PRN
Status: DISCONTINUED | OUTPATIENT
Start: 2023-07-31 | End: 2023-07-31

## 2023-07-31 RX ORDER — ONDANSETRON 4 MG/1
4 TABLET, ORALLY DISINTEGRATING ORAL EVERY 8 HOURS PRN
Status: DISCONTINUED | OUTPATIENT
Start: 2023-07-31 | End: 2023-08-01 | Stop reason: HOSPADM

## 2023-07-31 RX ORDER — DEXAMETHASONE SODIUM PHOSPHATE 4 MG/ML
INJECTION, SOLUTION INTRA-ARTICULAR; INTRALESIONAL; INTRAMUSCULAR; INTRAVENOUS; SOFT TISSUE PRN
Status: DISCONTINUED | OUTPATIENT
Start: 2023-07-31 | End: 2023-07-31 | Stop reason: SDUPTHER

## 2023-07-31 RX ORDER — POLYETHYLENE GLYCOL 3350 17 G/17G
17 POWDER, FOR SOLUTION ORAL DAILY
Status: DISCONTINUED | OUTPATIENT
Start: 2023-07-31 | End: 2023-08-01 | Stop reason: HOSPADM

## 2023-07-31 RX ORDER — ACETAMINOPHEN 325 MG/1
650 TABLET ORAL EVERY 6 HOURS
Status: DISCONTINUED | OUTPATIENT
Start: 2023-07-31 | End: 2023-08-01 | Stop reason: HOSPADM

## 2023-07-31 RX ORDER — FENTANYL CITRATE 50 UG/ML
INJECTION, SOLUTION INTRAMUSCULAR; INTRAVENOUS PRN
Status: DISCONTINUED | OUTPATIENT
Start: 2023-07-31 | End: 2023-07-31 | Stop reason: SDUPTHER

## 2023-07-31 RX ORDER — SODIUM CHLORIDE 0.9 % (FLUSH) 0.9 %
5-40 SYRINGE (ML) INJECTION PRN
Status: DISCONTINUED | OUTPATIENT
Start: 2023-07-31 | End: 2023-07-31 | Stop reason: HOSPADM

## 2023-07-31 RX ORDER — AMLODIPINE BESYLATE 10 MG/1
10 TABLET ORAL DAILY
Status: DISCONTINUED | OUTPATIENT
Start: 2023-07-31 | End: 2023-08-01 | Stop reason: HOSPADM

## 2023-07-31 RX ORDER — OXYCODONE HYDROCHLORIDE 10 MG/1
10 TABLET ORAL EVERY 4 HOURS PRN
Status: DISCONTINUED | OUTPATIENT
Start: 2023-07-31 | End: 2023-08-01 | Stop reason: HOSPADM

## 2023-07-31 RX ORDER — METAXALONE 800 MG/1
800 TABLET ORAL EVERY 8 HOURS PRN
Status: DISCONTINUED | OUTPATIENT
Start: 2023-07-31 | End: 2023-08-01 | Stop reason: HOSPADM

## 2023-07-31 RX ORDER — DIPHENHYDRAMINE HYDROCHLORIDE 50 MG/ML
12.5 INJECTION INTRAMUSCULAR; INTRAVENOUS
Status: DISCONTINUED | OUTPATIENT
Start: 2023-07-31 | End: 2023-07-31 | Stop reason: HOSPADM

## 2023-07-31 RX ORDER — MIDAZOLAM HYDROCHLORIDE 1 MG/ML
INJECTION INTRAMUSCULAR; INTRAVENOUS PRN
Status: DISCONTINUED | OUTPATIENT
Start: 2023-07-31 | End: 2023-07-31 | Stop reason: SDUPTHER

## 2023-07-31 RX ORDER — SUCCINYLCHOLINE/SOD CL,ISO/PF 100 MG/5ML
SYRINGE (ML) INTRAVENOUS PRN
Status: DISCONTINUED | OUTPATIENT
Start: 2023-07-31 | End: 2023-07-31 | Stop reason: SDUPTHER

## 2023-07-31 RX ORDER — PROPOFOL 10 MG/ML
INJECTION, EMULSION INTRAVENOUS PRN
Status: DISCONTINUED | OUTPATIENT
Start: 2023-07-31 | End: 2023-07-31 | Stop reason: SDUPTHER

## 2023-07-31 RX ORDER — TAMSULOSIN HYDROCHLORIDE 0.4 MG/1
0.4 CAPSULE ORAL DAILY
Status: DISCONTINUED | OUTPATIENT
Start: 2023-07-31 | End: 2023-08-01 | Stop reason: HOSPADM

## 2023-07-31 RX ORDER — ONDANSETRON 2 MG/ML
4 INJECTION INTRAMUSCULAR; INTRAVENOUS
Status: COMPLETED | OUTPATIENT
Start: 2023-07-31 | End: 2023-07-31

## 2023-07-31 RX ORDER — GLYCOPYRROLATE 0.2 MG/ML
INJECTION INTRAMUSCULAR; INTRAVENOUS PRN
Status: DISCONTINUED | OUTPATIENT
Start: 2023-07-31 | End: 2023-07-31 | Stop reason: SDUPTHER

## 2023-07-31 RX ORDER — SODIUM CHLORIDE, SODIUM LACTATE, POTASSIUM CHLORIDE, CALCIUM CHLORIDE 600; 310; 30; 20 MG/100ML; MG/100ML; MG/100ML; MG/100ML
INJECTION, SOLUTION INTRAVENOUS CONTINUOUS
Status: DISCONTINUED | OUTPATIENT
Start: 2023-07-31 | End: 2023-07-31

## 2023-07-31 RX ORDER — ROCURONIUM BROMIDE 10 MG/ML
INJECTION, SOLUTION INTRAVENOUS PRN
Status: DISCONTINUED | OUTPATIENT
Start: 2023-07-31 | End: 2023-07-31 | Stop reason: SDUPTHER

## 2023-07-31 RX ORDER — LIDOCAINE HYDROCHLORIDE 10 MG/ML
1 INJECTION, SOLUTION EPIDURAL; INFILTRATION; INTRACAUDAL; PERINEURAL
Status: DISCONTINUED | OUTPATIENT
Start: 2023-07-31 | End: 2023-07-31

## 2023-07-31 RX ORDER — VANCOMYCIN HYDROCHLORIDE 1 G/20ML
INJECTION, POWDER, LYOPHILIZED, FOR SOLUTION INTRAVENOUS PRN
Status: DISCONTINUED | OUTPATIENT
Start: 2023-07-31 | End: 2023-07-31 | Stop reason: ALTCHOICE

## 2023-07-31 RX ORDER — FAMOTIDINE 20 MG/1
20 TABLET, FILM COATED ORAL 2 TIMES DAILY
Status: DISCONTINUED | OUTPATIENT
Start: 2023-07-31 | End: 2023-08-01 | Stop reason: HOSPADM

## 2023-07-31 RX ORDER — DIPHENHYDRAMINE HYDROCHLORIDE 50 MG/ML
25 INJECTION INTRAMUSCULAR; INTRAVENOUS EVERY 6 HOURS PRN
Status: DISCONTINUED | OUTPATIENT
Start: 2023-07-31 | End: 2023-08-01 | Stop reason: HOSPADM

## 2023-07-31 RX ADMIN — ACETAMINOPHEN 325MG 650 MG: 325 TABLET ORAL at 19:41

## 2023-07-31 RX ADMIN — MIDAZOLAM 2 MG: 1 INJECTION, SOLUTION INTRAMUSCULAR; INTRAVENOUS at 14:42

## 2023-07-31 RX ADMIN — DEXAMETHASONE SODIUM PHOSPHATE 8 MG: 4 INJECTION INTRA-ARTICULAR; INTRALESIONAL; INTRAMUSCULAR; INTRAVENOUS; SOFT TISSUE at 15:21

## 2023-07-31 RX ADMIN — SODIUM CHLORIDE, PRESERVATIVE FREE 10 ML: 5 INJECTION INTRAVENOUS at 21:31

## 2023-07-31 RX ADMIN — FENTANYL CITRATE 50 MCG: 50 INJECTION, SOLUTION INTRAMUSCULAR; INTRAVENOUS at 16:55

## 2023-07-31 RX ADMIN — SODIUM CHLORIDE, POTASSIUM CHLORIDE, SODIUM LACTATE AND CALCIUM CHLORIDE: 600; 310; 30; 20 INJECTION, SOLUTION INTRAVENOUS at 12:27

## 2023-07-31 RX ADMIN — FAMOTIDINE 20 MG: 20 TABLET, FILM COATED ORAL at 12:24

## 2023-07-31 RX ADMIN — FAMOTIDINE 20 MG: 10 INJECTION, SOLUTION INTRAVENOUS at 21:16

## 2023-07-31 RX ADMIN — FENTANYL CITRATE 50 MCG: 50 INJECTION, SOLUTION INTRAMUSCULAR; INTRAVENOUS at 16:45

## 2023-07-31 RX ADMIN — OXYCODONE HYDROCHLORIDE 10 MG: 10 TABLET ORAL at 19:53

## 2023-07-31 RX ADMIN — FENTANYL CITRATE 50 MCG: 50 INJECTION INTRAMUSCULAR; INTRAVENOUS at 15:05

## 2023-07-31 RX ADMIN — WATER 2000 MG: 1 INJECTION, SOLUTION INTRAMUSCULAR; INTRAVENOUS; SUBCUTANEOUS at 15:02

## 2023-07-31 RX ADMIN — HYDROMORPHONE HYDROCHLORIDE 0.25 MG: 1 INJECTION, SOLUTION INTRAMUSCULAR; INTRAVENOUS; SUBCUTANEOUS at 17:14

## 2023-07-31 RX ADMIN — PREGABALIN 75 MG: 75 CAPSULE ORAL at 12:24

## 2023-07-31 RX ADMIN — ROCURONIUM BROMIDE 10 MG: 10 INJECTION, SOLUTION INTRAVENOUS at 14:51

## 2023-07-31 RX ADMIN — AMLODIPINE BESYLATE 10 MG: 10 TABLET ORAL at 19:52

## 2023-07-31 RX ADMIN — TAMSULOSIN HYDROCHLORIDE 0.4 MG: 0.4 CAPSULE ORAL at 12:24

## 2023-07-31 RX ADMIN — HYDROMORPHONE HYDROCHLORIDE 0.5 MG: 2 INJECTION, SOLUTION INTRAMUSCULAR; INTRAVENOUS; SUBCUTANEOUS at 17:45

## 2023-07-31 RX ADMIN — HYDROMORPHONE HYDROCHLORIDE 0.25 MG: 1 INJECTION, SOLUTION INTRAMUSCULAR; INTRAVENOUS; SUBCUTANEOUS at 17:01

## 2023-07-31 RX ADMIN — PROPOFOL 200 MG: 10 INJECTION, EMULSION INTRAVENOUS at 14:51

## 2023-07-31 RX ADMIN — CELECOXIB 100 MG: 100 CAPSULE ORAL at 12:24

## 2023-07-31 RX ADMIN — ROCURONIUM BROMIDE 20 MG: 10 INJECTION, SOLUTION INTRAVENOUS at 15:03

## 2023-07-31 RX ADMIN — ONDANSETRON 4 MG: 2 INJECTION INTRAMUSCULAR; INTRAVENOUS at 17:01

## 2023-07-31 RX ADMIN — FENTANYL CITRATE 50 MCG: 50 INJECTION INTRAMUSCULAR; INTRAVENOUS at 15:13

## 2023-07-31 RX ADMIN — GLYCOPYRROLATE 0.4 MG: 0.2 INJECTION INTRAMUSCULAR; INTRAVENOUS at 16:02

## 2023-07-31 RX ADMIN — CEFAZOLIN 2000 MG: 1 INJECTION, POWDER, FOR SOLUTION INTRAMUSCULAR; INTRAVENOUS at 21:35

## 2023-07-31 RX ADMIN — FENTANYL CITRATE 50 MCG: 50 INJECTION INTRAMUSCULAR; INTRAVENOUS at 15:25

## 2023-07-31 RX ADMIN — Medication 3 MG: at 16:02

## 2023-07-31 RX ADMIN — Medication 100 MG: at 14:51

## 2023-07-31 RX ADMIN — ONDANSETRON 4 MG: 2 INJECTION INTRAMUSCULAR; INTRAVENOUS at 21:11

## 2023-07-31 RX ADMIN — ONDANSETRON 4 MG: 2 INJECTION INTRAMUSCULAR; INTRAVENOUS at 15:21

## 2023-07-31 RX ADMIN — SODIUM CHLORIDE: 4.5 INJECTION, SOLUTION INTRAVENOUS at 21:31

## 2023-07-31 RX ADMIN — BISACODYL 5 MG: 5 TABLET, COATED ORAL at 19:53

## 2023-07-31 RX ADMIN — FENTANYL CITRATE 50 MCG: 50 INJECTION INTRAMUSCULAR; INTRAVENOUS at 14:51

## 2023-07-31 ASSESSMENT — PAIN DESCRIPTION - ONSET: ONSET: GRADUAL

## 2023-07-31 ASSESSMENT — PAIN DESCRIPTION - ORIENTATION
ORIENTATION: ANTERIOR

## 2023-07-31 ASSESSMENT — PAIN DESCRIPTION - FREQUENCY: FREQUENCY: INTERMITTENT

## 2023-07-31 ASSESSMENT — PAIN - FUNCTIONAL ASSESSMENT
PAIN_FUNCTIONAL_ASSESSMENT: PREVENTS OR INTERFERES SOME ACTIVE ACTIVITIES AND ADLS
PAIN_FUNCTIONAL_ASSESSMENT: 0-10
PAIN_FUNCTIONAL_ASSESSMENT: PREVENTS OR INTERFERES SOME ACTIVE ACTIVITIES AND ADLS

## 2023-07-31 ASSESSMENT — PAIN DESCRIPTION - DESCRIPTORS
DESCRIPTORS: ACHING
DESCRIPTORS: ACHING

## 2023-07-31 ASSESSMENT — PAIN SCALES - GENERAL
PAINLEVEL_OUTOF10: 9
PAINLEVEL_OUTOF10: 3
PAINLEVEL_OUTOF10: 7
PAINLEVEL_OUTOF10: 9
PAINLEVEL_OUTOF10: 8
PAINLEVEL_OUTOF10: 3
PAINLEVEL_OUTOF10: 6
PAINLEVEL_OUTOF10: 7
PAINLEVEL_OUTOF10: 4

## 2023-07-31 ASSESSMENT — PAIN DESCRIPTION - LOCATION
LOCATION: NECK

## 2023-07-31 ASSESSMENT — PAIN DESCRIPTION - DIRECTION: RADIATING_TOWARDS: NECK

## 2023-07-31 ASSESSMENT — PAIN DESCRIPTION - PAIN TYPE
TYPE: SURGICAL PAIN
TYPE: SURGICAL PAIN

## 2023-07-31 NOTE — PERIOP NOTE
Patient Robert Underwood has been informed that [facility] is not responsible for patient belongings per policy and the signed [facility] Patient Agreement document. Personal items should be sent home or checked in with security. Patient Robert Underwood selected the following action:                            [x]  Send personal items home with a family member or friend                                                 []  Check in personal items with security, excluding clothing                            []  Maintain personal items at the bedside, against recommendation                                 by [address] [place] [address]                                   ** If patient Elaine Plaza chooses to maintain personal items at the bedside,                                      Complete the patient belongings inventory in the EMR.
TRANSFER - OUT REPORT:    Verbal report given to Barbara Leslie on Monica Mccann  being transferred to [facility] for routine post-op       Report consisted of patient's Situation, Background, Assessment and   Recommendations(SBAR). Information from the following report(s) Nurse Handoff Report, Surgery Report, and MAR was reviewed with the receiving nurse. Lines:   Peripheral IV 07/31/23 Left Antecubital (Active)   Site Assessment Clean, dry & intact 07/31/23 1759   Line Status Infusing 07/31/23 1759   Line Care Connections checked and tightened 07/31/23 1759   Phlebitis Assessment No symptoms 07/31/23 1759   Infiltration Assessment 0 07/31/23 1759   Alcohol Cap Used No 07/31/23 1759   Dressing Status Clean, dry & intact 07/31/23 1759   Dressing Type Transparent 07/31/23 1759        Opportunity for questions and clarification was provided.       Patient transported with:  [facility]
you for 24 hours after your surgery. 16. ONE VISITOR will be allowed in the waiting area during your surgery. Exceptions may be made for surgical admissions, per nursing unit guidelines      Special Instructions:      Bring a list of CURRENT medications. Follow instructions from the office regarding medications to take the morning of surgery. For any questions or concerns on the day of procedure, please call the Pre-op department at [phone]    These surgical instructions were reviewed with Lafaye Aschoff during the PAT phone call.

## 2023-07-31 NOTE — DISCHARGE INSTRUCTIONS
Dr. Randalyn Gitelman Instructions: Cervical Surgery    DO NOT take any NSAIDS if you had Fusion Surgery. ACTIVITIES:  Change positions every hour while you are awake. Walking is the best way to rebuild strength. Wear your soft collar just for comfort if it is provided to you. You may remove if desired. Sleep with your head elevated for 2-3 nights after surgery to prevent swelling. Avoid strenuous activity, such as yard work, vacuuming, or lifting anything heavier than a gallon of milk. Avoid strenuous activities, such as vacuuming, and do not lift anything heavier than 1 gallon of milk (or about 5-8 pounds). Walk at a pace that avoids fatigue or severe pain. Do not try to walk several blocks the first day! Follow-up with Dr. Ephraim Thornton will be 7-10 days from surgery. BATHING and INCISION CARE:  The incision may be tender or feel numb: this is normal.   Keep the incision clean and dry. You may shower 3 days after surgery. Cover the dressing with saran wrap before getting in the shower. The incision is closed with sutures under the skin and glue on top. Do not apply any lotions, ointments or oils on the incision. Do not remove the dressing. Your dressing will be changed at your first post op appointment. If it comes loose or is damaged, dirty or wet before this appointment, call your home health nurse (if you are being seen by a nurse at home) or the office to have the dressing changed. If you notice any excessive swelling, redness, or persistent drainage around the incision, notify the office immediately. CONSTIPATION:  Take a stool softener twice a day while you are taking a narcotic. If you have not had a bowel movement within 3 days of surgery, you will need to use a laxative or suppository that can be obtained over the counter at your local pharmacy     ICE  Use ice on your neck and shoulders to decrease pain and swelling. Do NOT use heat.     MEDICATIONS:  If you had

## 2023-07-31 NOTE — ANESTHESIA POSTPROCEDURE EVALUATION
Department of Anesthesiology  Postprocedure Note    Patient: Alex Munoz  MRN: [id number]  YOB: 1965  Date of evaluation: 7/31/2023      Procedure Summary     Date: 07/31/23 Room / Location: [facility] MAIN 06 / [facility] MAIN OR    Anesthesia Start: 1442 Anesthesia Stop: 1624    Procedure: ANTERIOR CERVICAL DECOMPRESSION FUSION CERVICAL FIVE/SIX, CERVICAL SIX/SEVEN (Spine Cervical) Diagnosis:       HNP (herniated nucleus pulposus), cervical      (HNP (herniated nucleus pulposus), cervical [M50.20])    Surgeons: Parminder Baez MD Responsible Provider: Danielle Posadas DO    Anesthesia Type: General ASA Status: 3          Anesthesia Type: General    Kim Phase I: Kim Score: 10    Kim Phase II:        Anesthesia Post Evaluation    Patient location during evaluation: PACU  Patient participation: complete - patient participated  Level of consciousness: awake and alert  Airway patency: patent  Nausea & Vomiting: no nausea and no vomiting  Complications: no  Cardiovascular status: hemodynamically stable  Respiratory status: acceptable  Hydration status: stable  Multimodal analgesia pain management approach

## 2023-07-31 NOTE — OP NOTE
[address]  OPERATIVE REPORT    Name:  Fernando Fu  MR#:   [id number]  :  1965  ACCOUNT #:  [de-identified]  DATE OF SERVICE:  2023    PREOPERATIVE DIAGNOSIS:  Cervical spondylotic myelopathy. POSTOPERATIVE DIAGNOSIS:  Cervical spondylotic myelopathy. PROCEDURES:  Anterior cervical decompression and fusion C5-6, anterior cervical decompression and fusion C6-7, structural allograft x2 and cervical plate fixation C5, C6, C7 with [place] and cervical plate and screws. SURGEON:  Sho Jacobson MD    ASSISTANT:  jami bruno    ANESTHESIA:  General endotracheal.    COMPLICATIONS:  None. IMPLANTS:  Nae. ESTIMATED BLOOD LOSS:  Minimal.    SPECIMENS REMOVED:  None. FINDINGS:  The patient had cervical disk herniation in left paracentral position as well as uncinate spurring and thickened posterior longitudinal ligament, all causing central and lateral recess stenosis. DESCRIPTION OF OPERATION:  Following induction of endotracheal anesthesia, the patient turned in and placed in a supine position on a cervical head peterson. The patient prepped, draped in usual fashion. Right-sided approach was utilized. The sternocleidomastoid and great vessels mobilized laterally. Esophagus and trachea mobilized medially with blunt finger dissection. The prevertebral fascia was entered. C-arm image verified the surgical level. Beginning at C5-6 Hamilton pins placed in the segments above and below. Cloward self-retaining retractor blades placed under the cover of longus colli musculature bilaterally. Annular tissue was incised. A radical diskectomy done back to the posterior margin. Posterior marginal osteophytes thinned with a high-speed bur, resected with 4-0 Dixie curette and sella punch. Thickened posterior ligament and uncinate spurs resected. A thorough decompression done in the epidural space. Endplates prepared.   Structural allograft was sized and then tamped firmly

## 2023-07-31 NOTE — ANESTHESIA PRE PROCEDURE
Department of Anesthesiology  Preprocedure Note       Name:  Kyara Labrum   Age:  60 y.o.  :  1965                                          MRN:  [id number]         Date:  2023      Surgeon: Mary Kate Morton):  Rhonda Sampson MD    Procedure: Procedure(s):  ANTERIOR CERVICAL DECOMPRESSION FUSION CERVICAL FIVE/SIX, CERVICAL SIX/SEVEN; C-ARM; [FREDDIE]; 23 HR    Medications prior to admission:   Prior to Admission medications    Medication Sig Start Date End Date Taking? Authorizing Provider   naloxone 4 MG/0.1ML LIQD nasal spray  23   Historical Provider, MD   vitamin D (ERGOCALCIFEROL) 1.25 MG (14714 UT) CAPS capsule TAKE 1 CAPSULE BY MOUTH WEEKLY 23   Historical Provider, MD   oxyCODONE-acetaminophen (PERCOCET) 5-325 MG per tablet Take 1 tablet by mouth every 6 hours as needed. Max Daily Amount: 4 tablets  Patient not taking: Reported on 2023   Historical Provider, MD   traMADol (ULTRAM) 50 MG tablet Take 1 tablet by mouth every 6 hours as needed. Max Daily Amount: 200 mg  Patient not taking: Reported on 2023   Historical Provider, MD   topiramate (TOPAMAX) 25 MG tablet Take 1 in the evening for 1 week, then increase to 2 the second week and continue with 3 in the evening 23   Bryn Lamas MD   gabapentin (NEURONTIN) 300 MG capsule 1 in the morning and 2 in the evening as directed  Patient not taking: Reported on 2023  Bryn Lamas MD   diclofenac (VOLTAREN) 75 MG EC tablet Take 1 tablet by mouth 2 times daily (with meals) Take as needed for pain  Patient taking differently: Take 1 tablet by mouth as needed Take as needed for pain 23   Bryn Lamas MD   amLODIPine (NORVASC) 10 MG tablet Take by mouth daily    Ar Automatic Reconciliation   gabapentin (NEURONTIN) 300 MG capsule Take by mouth as needed.   Patient not taking: Reported on 2023    Ar Automatic Reconciliation   oxyCODONE HCl (OXY-IR) 10 MG immediate release tablet

## 2023-07-31 NOTE — BRIEF OP NOTE
Brief Postoperative Note      Patient: Maryan Jolly  YOB: 1965  MRN: [id number]    Date of Procedure: 7/31/2023    Pre-Op Diagnosis Codes:     * HNP (herniated nucleus pulposus), cervical [M50.20]    Post-Op Diagnosis: Same       Procedure(s):  ANTERIOR CERVICAL DECOMPRESSION FUSION CERVICAL FIVE/SIX, CERVICAL SIX/SEVEN; C-ARM; [FREDDIE]; 23 HR    Surgeon(s):  Jennifre Rausch MD    Assistant:  Physician Assistant: Mila Gomes PA-C    Anesthesia: General    Estimated Blood Loss (mL): Minimal    Complications: None    Specimens:   * No specimens in log *    Implants:  Implant Name Type Inv. Item Serial No.  Lot No. LRB No. Used Action   ALLOGRAFT BNE SPACER 7 DEG 14.5X11. 5X8 MM CERV CORTICAL CANC - [id number]  ALLOGRAFT BNE SPACER 7 DEG 14.5X11. 5X8 MM CERV CORTICAL CANC [id number] [employer]  N/A 1 Implanted   ALLOGRAFT BNE SPACER 7 DEG 14.5X11.5X9 MM CERV CORTICAL CANC - [id number]  ALLOGRAFT BNE SPACER 7 DEG 14.5X11.5X9 MM CERV CORTICAL CANC [id number] [employer] [employer]  N/A 1 Implanted   PLATE SPNL 2 LEVEL 38 MM CERV OZARK [id number] - [id number]  PLATE SPNL 2 LEVEL 38 MM CERV OZARK [id number]  [employer]EDDIE [id number] N/A 1 Implanted   SCREW SPNL L14MM DIA4MM SELF STARTING KASANDRA ANT CERV TI OZARK - [id number]  SCREW SPNL L14MM DIA4MM SELF STARTING KASANDRA ANT CERV TI OZARK  [employer]IE [employer]- 0000 N/A 6 Implanted         Drains: * No LDAs found *    Findings: stensosis      Electronically signed by Jennifer Rausch MD on 7/31/2023 at 3:55 PM

## 2023-07-31 NOTE — INTERVAL H&P NOTE
Update History & Physical    The patient's History and Physical of July 27, 2023 was reviewed with the patient and I examined the patient. There was no change. The surgical site was confirmed by the patient and me. Plan: The risks, benefits, expected outcome, and alternative to the recommended procedure have been discussed with the patient. Patient understands and wants to proceed with the procedure.      Electronically signed by Tania Fischer MD on 7/31/2023 at 1:38 PM

## 2023-08-01 ENCOUNTER — HOME HEALTH ADMISSION (OUTPATIENT)
Age: 58
End: 2023-08-01
Payer: COMMERCIAL

## 2023-08-01 VITALS
TEMPERATURE: 97.5 F | BODY MASS INDEX: 31.22 KG/M2 | OXYGEN SATURATION: 95 % | HEART RATE: 82 BPM | DIASTOLIC BLOOD PRESSURE: 83 MMHG | HEIGHT: 71 IN | RESPIRATION RATE: 16 BRPM | SYSTOLIC BLOOD PRESSURE: 148 MMHG | WEIGHT: 223 LBS

## 2023-08-01 LAB
ANION GAP SERPL CALC-SCNC: 8 MMOL/L (ref 3–18)
BUN SERPL-MCNC: 19 MG/DL (ref 7–18)
BUN/CREAT SERPL: 10 (ref 12–20)
CALCIUM SERPL-MCNC: 8.9 MG/DL (ref 8.5–10.1)
CHLORIDE SERPL-SCNC: 105 MMOL/L (ref 100–111)
CO2 SERPL-SCNC: 23 MMOL/L (ref 21–32)
CREAT SERPL-MCNC: 1.84 MG/DL (ref 0.6–1.3)
ERYTHROCYTE [DISTWIDTH] IN BLOOD BY AUTOMATED COUNT: 15.4 % (ref 11.6–14.5)
GLUCOSE SERPL-MCNC: 130 MG/DL (ref 74–99)
HCT VFR BLD AUTO: 35.8 % (ref 36–48)
HGB BLD-MCNC: 12.5 G/DL (ref 13–16)
MCH RBC QN AUTO: 26.8 PG (ref 24–34)
MCHC RBC AUTO-ENTMCNC: 34.9 G/DL (ref 31–37)
MCV RBC AUTO: 76.7 FL (ref 78–100)
NRBC # BLD: 0 K/UL (ref 0–0.01)
NRBC BLD-RTO: 0 PER 100 WBC
PLATELET # BLD AUTO: 303 K/UL (ref 135–420)
PMV BLD AUTO: 9.8 FL (ref 9.2–11.8)
POTASSIUM SERPL-SCNC: 4.4 MMOL/L (ref 3.5–5.5)
RBC # BLD AUTO: 4.67 M/UL (ref 4.35–5.65)
SODIUM SERPL-SCNC: 136 MMOL/L (ref 136–145)
WBC # BLD AUTO: 13.4 K/UL (ref 4.6–13.2)

## 2023-08-01 PROCEDURE — G0378 HOSPITAL OBSERVATION PER HR: HCPCS

## 2023-08-01 PROCEDURE — 92610 EVALUATE SWALLOWING FUNCTION: CPT

## 2023-08-01 PROCEDURE — 97535 SELF CARE MNGMENT TRAINING: CPT

## 2023-08-01 PROCEDURE — 85027 COMPLETE CBC AUTOMATED: CPT

## 2023-08-01 PROCEDURE — 80048 BASIC METABOLIC PNL TOTAL CA: CPT

## 2023-08-01 PROCEDURE — 97162 PT EVAL MOD COMPLEX 30 MIN: CPT

## 2023-08-01 PROCEDURE — 6360000002 HC RX W HCPCS: Performed by: ORTHOPAEDIC SURGERY

## 2023-08-01 PROCEDURE — 36415 COLL VENOUS BLD VENIPUNCTURE: CPT

## 2023-08-01 PROCEDURE — 2580000003 HC RX 258: Performed by: ORTHOPAEDIC SURGERY

## 2023-08-01 PROCEDURE — 94761 N-INVAS EAR/PLS OXIMETRY MLT: CPT

## 2023-08-01 PROCEDURE — 6370000000 HC RX 637 (ALT 250 FOR IP): Performed by: ORTHOPAEDIC SURGERY

## 2023-08-01 PROCEDURE — 97165 OT EVAL LOW COMPLEX 30 MIN: CPT

## 2023-08-01 RX ORDER — OXYCODONE HYDROCHLORIDE 5 MG/1
5 TABLET ORAL EVERY 6 HOURS PRN
Qty: 28 TABLET | Refills: 0 | Status: SHIPPED | OUTPATIENT
Start: 2023-08-01 | End: 2023-08-08

## 2023-08-01 RX ADMIN — TAMSULOSIN HYDROCHLORIDE 0.4 MG: 0.4 CAPSULE ORAL at 09:23

## 2023-08-01 RX ADMIN — ACETAMINOPHEN 325MG 650 MG: 325 TABLET ORAL at 00:17

## 2023-08-01 RX ADMIN — AMLODIPINE BESYLATE 10 MG: 10 TABLET ORAL at 09:23

## 2023-08-01 RX ADMIN — ACETAMINOPHEN 325MG 650 MG: 325 TABLET ORAL at 06:25

## 2023-08-01 RX ADMIN — CEFAZOLIN 2000 MG: 1 INJECTION, POWDER, FOR SOLUTION INTRAMUSCULAR; INTRAVENOUS at 06:23

## 2023-08-01 RX ADMIN — POLYETHYLENE GLYCOL 3350 17 G: 17 POWDER, FOR SOLUTION ORAL at 09:23

## 2023-08-01 RX ADMIN — OXYCODONE HYDROCHLORIDE 5 MG: 5 TABLET ORAL at 00:25

## 2023-08-01 RX ADMIN — BISACODYL 5 MG: 5 TABLET, COATED ORAL at 09:23

## 2023-08-01 RX ADMIN — FAMOTIDINE 20 MG: 20 TABLET, FILM COATED ORAL at 09:23

## 2023-08-01 ASSESSMENT — PAIN SCALES - GENERAL
PAINLEVEL_OUTOF10: 4
PAINLEVEL_OUTOF10: 0
PAINLEVEL_OUTOF10: 7

## 2023-08-01 ASSESSMENT — PAIN DESCRIPTION - DESCRIPTORS: DESCRIPTORS: ACHING

## 2023-08-01 ASSESSMENT — PAIN DESCRIPTION - LOCATION: LOCATION: BACK

## 2023-08-01 NOTE — CARE COORDINATION
SW reviewed patient's chart, discharge orders, and home health orders. Patient referred to [facility] for home care. Patient family/friends to transport home at time of discharge. No DME needs identified at this time. No additional CM need identified at this time. SW to remain available as needed.     Dulce Ortiz LMSW   Case Management

## 2023-08-01 NOTE — PROGRESS NOTES
OCCUPATIONAL THERAPY EVALUATION/DISCHARGE    Patient: Skinny Pond (8 y.o. male)  Date: 8/1/2023  Primary Diagnosis: HNP (herniated nucleus pulposus), cervical [M50.20]  Cervical myelopathy (HCC) [G95.9]  Procedure(s) (LRB):  ANTERIOR CERVICAL DECOMPRESSION FUSION CERVICAL FIVE/SIX, CERVICAL SIX/SEVEN (N/A) 1 Day Post-Op   Precautions: Fall Risk, Spinal Precautions: No Bending, No Lifting, No Twisting  PLOF: Patient was independent with self-care and functional mobility PTA. ASSESSMENT AND RECOMMENDATIONS:  Patient cleared to participate in OT evaluation by RN. Upon entering the room, patient was supine in bed, alert, and agreeable to participate in OT evaluation. Cervical spine precautions reviewed and patient verbalized understanding. Based on the objective data below, patient is able to perform basic self care tasks without assistance while seated and in standing. During evaluation, patient completed functional transfers with modified independence, functional mobility with independence and upper and lower body dressing independently. Patient has a supportive family at home to assist prn and all needed DME. Patient left all needs met and call bell in reach. Maximum therapeutic gains met at current level of care and patient will be discharged from occupational therapy at this time. Further Equipment Recommendations for Discharge: N/A    AMPAC: At this time and based on an AM-PAC score 24/24, no further OT is recommended upon discharge. Recommend patient returns to prior setting with prior services. This AMPAC score should be considered in conjunction with interdisciplinary team recommendations to determine the most appropriate discharge setting. Patient's social support, diagnosis, medical stability, and prior level of function should also be taken into consideration.      SUBJECTIVE:   Patient stated Duncan Harvey had surgery on my right knee in march and used a cane after but I was told now after

## 2023-08-01 NOTE — NURSE NAVIGATOR
Rounded on patient s/p  Anterior cervical decompression and fusion C5-6, anterior cervical decompression and fusion C6-7, structural allograft x2 and cervical plate fixation C5, C6, C7 with Dr. Mila Turner 07/29/2023. Patient observed to be alert and oriented x 3, sitting up in bed. He denies chest pain, shortness of breath, nausea or vomiting. He denies any numbness or tingling to his upper extremities. He denies any difficulty swallowing. He does have throat soreness. Soft collar observed to neck, dressing underneath on anterior neck observed to be clean, dry and intact, nicole drain has been removed. Per patient pain has been well controlled throughout he night. He reports ambulating to the restroom, voiding without difficulty. He states that pain and numbness in his left hand has completely resolved since surgery so he is very pleased. Spine surgery education book was provided to patient at the clinic level and all education was reviewed on the preoperative call. Reminded patient that he may shower on Thursday, no tubs or submersion in water. He is to contact clinic If dressing becomes more than 50 percent saturated or if dressing begins to lift. Reminded patient that collar is for comfort only and may be removed when lying in bed or when eating. Encouraged patient to sleep with head elevated for the next few days to assist with swelling. Encouraged soft foods. Reminded patient to ice hourly to assist with swelling. Encouraged hourly ambulation and repositioning to assist with pain and stiffness. Patient verbalized understanding of all information provided. He has no questions or concerns at this time. He has cleared safe for discharge by PT/ Ot and has all required DME at home with a support system in place. He will discharge home with home physical therapy. Will follow patient postoperatively.

## 2023-08-01 NOTE — PROGRESS NOTES
0025 Pacient in bed AAOx4, no respiratory distress noted. Complained 7/10 incisional pain ,gave oxycodone 5 mg po. Anterior cervical dressing dry and clean,ROSALINE in place draining serosanguinous liquid. Neuro Vascular intact, denies numbness, denies tingling,move all the limbs. 0040 Patient voided adequate amounts, ambulated with walker in the hallway.

## 2023-08-01 NOTE — PROGRESS NOTES
conducted an initial consultation and Spiritual Assessment for Monica Mccann, who is a 60 y.o.,male. Patient's Primary Language is: [place]. According to the patient's EMR Nondenominational Affiliation is: None. The reason the Patient came to the hospital is:   Patient Active Problem List    Diagnosis Date Noted    Cervical myelopathy ([address]) 07/31/2023        The  provided the following Interventions:  Initiated a relationship of care and support. Explored issues of rush, belief, spirituality and Jewish/ritual needs while hospitalized. Listened empathically. Provided information about Spiritual Care Services. Offered prayer and assurance of continued prayers on patient's behalf. Chart reviewed. The following outcomes where achieved:   confirmed Patient's Nondenominational Affiliation. Patient expressed gratitude for 's visit. Assessment:  Patient does not have any Jewish/cultural needs that will affect patient's preferences in health care. There are no spiritual or Jewish issues which require intervention at this time. Plan:  Chaplains will continue to follow and will provide pastoral care on an as needed/requested basis.  recommends bedside caregivers page  on duty if patient shows signs of acute spiritual or emotional distress.     [address] [facility]   [phone]

## 2023-08-01 NOTE — PROGRESS NOTES
Report received from [address] to bathroom  Avelino removed  Patient tolerated liquids and regular breakfast without difficulty  Passed physio  Discharge instructions given  Discharged to home

## 2023-08-01 NOTE — PROGRESS NOTES
OT order received and chart reviewed. Patient was seen for skilled OT and is safe for d/c home when medically stable/cleared by PT. Recommending no AD as patient has all DME at home. Full note to follow.          Thank you for this referral,  Roxana Martínez MS, OTR/L

## 2023-08-01 NOTE — HOME CARE
Received home health referral for [facility] for PT,Felicia protocol . Discharge order noted for today; spoke to patient in room, Verified demographics,explained [facility] services ,answered all questions and provided patient with [facility] contact card ; Patient states he has DME: JAVIER and cane ; Patient states his son Tammie Sanford) and his spouse Adriana Astudillo) will assist him at home ; [facility] referral processed by [facility] liaison ,Zoila Lion to [facility] central Intake and scheduling . UMA YAP LPN.

## 2023-08-02 ENCOUNTER — HOME CARE VISIT (OUTPATIENT)
Age: 58
End: 2023-08-02
Payer: COMMERCIAL

## 2023-08-02 ENCOUNTER — TELEPHONE (OUTPATIENT)
Facility: HOSPITAL | Age: 58
End: 2023-08-02

## 2023-08-02 VITALS
DIASTOLIC BLOOD PRESSURE: 80 MMHG | BODY MASS INDEX: 31.22 KG/M2 | RESPIRATION RATE: 16 BRPM | HEART RATE: 83 BPM | WEIGHT: 223 LBS | TEMPERATURE: 98.7 F | HEIGHT: 71 IN | SYSTOLIC BLOOD PRESSURE: 124 MMHG | OXYGEN SATURATION: 98 %

## 2023-08-02 PROCEDURE — G0151 HHCP-SERV OF PT,EA 15 MIN: HCPCS

## 2023-08-02 PROCEDURE — 0221000100 HH NO PAY CLAIM PROCEDURE

## 2023-08-02 ASSESSMENT — ENCOUNTER SYMPTOMS: PAIN LOCATION - PAIN QUALITY: ACHY

## 2023-08-02 NOTE — TELEPHONE ENCOUNTER
Call placed to patient, ID verified x 2. Patient is s/p  Anterior cervical decompression and fusion C5-6, anterior cervical decompression and fusion C6-7, structural allograft x2 and cervical plate fixation C5, C6, C7 with Dr. Joan Demarco 07/29/2023. Patient denies chest pain, shortness of breath, nausea, vomiting, fever or chills. He denies any numbness or pain in his upper extremities, he denies difficulty with bladder , he is passing gas. He states that his pain is well controlled at present and that his dressing is observed as clean, dry and intact. He is up ambulating ad dulce maria. Per patient Home physical therapy is currently with him now. He has no questions or concerns at this time. Terri Flower He will follow up with Dr. Trisha Quinones in two weeks or sooner if needed.
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
## 2023-08-04 ENCOUNTER — HOME CARE VISIT (OUTPATIENT)
Age: 58
End: 2023-08-04
Payer: COMMERCIAL

## 2023-08-04 VITALS
OXYGEN SATURATION: 98 % | TEMPERATURE: 98 F | DIASTOLIC BLOOD PRESSURE: 82 MMHG | HEART RATE: 78 BPM | RESPIRATION RATE: 16 BRPM | SYSTOLIC BLOOD PRESSURE: 140 MMHG

## 2023-08-04 PROCEDURE — G0157 HHC PT ASSISTANT EA 15: HCPCS

## 2023-08-04 NOTE — HOME HEALTH
SUBJECTIVE:  Patient reports feeling good & reports consistency & compliance with cervical collar at this time. DATE OF SURGERY: 7/31/23    PAIN: see pain assessment    OBJECTIVE: see interventions    NEXT MD APPT: 8/11/23    CAREGIVER ASSISTANCE NEEDED FOR: Caregiver needed for ADLs, IADLs, gait, transfers, stair navigation and transportation to/from medical appointments. ASSESSMENT AND PROGRESS TOWARD GOALS:  Patient demonstrated a good result to therapy this date as evidenced by therapist establishment of HEP exercises in standing with education provided on importance of frequency 3x daily for optimal strengthening results. Patient progressing gait training x 300' indoors no AD symmetrical gait pattern S/Mod I for safety at this time. Patient meeting bed mobility goals with return demo appropriate body mechanics Mod I. Patient progressing transfer goals with return demo appropriate body mechanics for SPT, shower & commode transfers S/Mod I for safety at this time. Patient reports continued compliance of cervical restrictions & donning of cervical collar at this time. Patient reports no cervical pain throughout session. PATIENT RESPONSE TO TREATMENT: 0/10 cervical pain throughout session. PATIENT LEVEL OF UNDERSTANDING OF EDUCATION: Return demonstration appropriate body mechanics for bed mobility, SPT, shower & commode transfers. CONTINUED NEED FOR THE FOLLOWING SKILLS: HH PT is medically necessary to address cervical pain, decreased ROM, decreased strength, increased swelling, impaired bed mobility, decreased independence with functional transfers, impaired gait, impaired stair negotiation, and impaired balance in order to improve functional independence, quality of life, return to PLOF, reduce the risk for falls, and reduce pain. PLAN: Outdoor gait, stair navigation, car transfer, dressing change. DISCHARGE PLANNING DISCUSSED: Patient to continue HHPT 3w1 at this time.

## 2023-08-07 ENCOUNTER — HOME CARE VISIT (OUTPATIENT)
Age: 58
End: 2023-08-07
Payer: COMMERCIAL

## 2023-08-07 VITALS
OXYGEN SATURATION: 98 % | HEART RATE: 88 BPM | RESPIRATION RATE: 16 BRPM | SYSTOLIC BLOOD PRESSURE: 140 MMHG | TEMPERATURE: 98.1 F | DIASTOLIC BLOOD PRESSURE: 84 MMHG

## 2023-08-07 PROCEDURE — G0157 HHC PT ASSISTANT EA 15: HCPCS

## 2023-08-07 NOTE — HOME HEALTH
SUBJECTIVE:  Patient reports no pain at this time. DATE OF SURGERY: 7/31/23    PAIN: see pain assessment    OBJECTIVE: see interventions    NEXT MD APPT: 8/11/23    CAREGIVER ASSISTANCE NEEDED FOR: Caregiver needed for ADLs, IADLs, gait, transfers, stair navigation and transportation to/from medical appointments. ASSESSMENT AND PROGRESS TOWARD GOALS:  Patient demonstrated a good result to therapy this date as evidenced by meeting of stair navigation goals with return demo appropriate body mechanics up/down 6 steps BHR 1UE support step to pattern Mod I. Patient meeting transfer goals this date with return demo appropriate body mechanics for car transfers Mod I. Patient progressing gait x 900' no AD symmetrical gait pattern Mod I in 6 min with O2 98% post, HR 116bpm.  Patient continues to report no pain & showing compliance with cervical neck collar. Dressing changed this date with waterproof adhesive bandage. No drainage noted with incision intact at this time. PATIENT RESPONSE TO TREATMENT: O2 98% & HR 116bpm post gait. PATIENT LEVEL OF UNDERSTANDING OF EDUCATION: Return demonstration appropriate body mechanics for car transfers, stair navigation. CONTINUED NEED FOR THE FOLLOWING SKILLS: HH PT is medically necessary to address cervical pain, decreased ROM, decreased strength, increased swelling, impaired bed mobility, decreased independence with functional transfers, impaired gait, impaired stair negotiation, and impaired balance in order to improve functional independence, quality of life, return to PLOF, reduce the risk for falls, and reduce pain. PLAN: Tinetti Balance Assessment, TUG, 5x STS, MMT, review of safety. DISCHARGE PLANNING DISCUSSED: Patient to continue HHPT 3w1 at this time.

## 2023-08-09 ENCOUNTER — HOME CARE VISIT (OUTPATIENT)
Age: 58
End: 2023-08-09
Payer: COMMERCIAL

## 2023-08-09 VITALS
OXYGEN SATURATION: 98 % | TEMPERATURE: 97.8 F | HEART RATE: 83 BPM | RESPIRATION RATE: 16 BRPM | DIASTOLIC BLOOD PRESSURE: 78 MMHG | SYSTOLIC BLOOD PRESSURE: 130 MMHG

## 2023-08-09 PROCEDURE — G0157 HHC PT ASSISTANT EA 15: HCPCS

## 2023-08-09 NOTE — HOME HEALTH
SUBJECTIVE:  Patient reports no pain at this time. DATE OF SURGERY: 7/31/23    PAIN: see pain assessment    OBJECTIVE: see interventions    NEXT MD APPT: 8/11/23    CAREGIVER ASSISTANCE NEEDED FOR: Caregiver needed for ADLs, IADLs, gait, transfers, stair navigation and transportation to/from medical appointments. Assessment and Summary of Care:  Patient's current functional status before discharge is as follows  Strength:   R hip flex 5/5  R hip abd 5/5  R hip add 5/5  R hip ext 5/5  R knee flex 5/5  R knee ext 4/5  R ankle DF 5/5  L hip flex 5/5  L hip abd 5/5  L hip ext 5/5  L hip add 5/5  L knee flex 5/5  L knee ext 5/5  L ankle DF 5/5  ROM:  N/A  Bed Mobility: Mod I  Transfers: SPT, Shower, Commode & Car Mod I.    Gait/WC mobility: 700' no AD symmetrical gait pattern Mod I  Stairs: Up/down 6 steps BHR 1UE support step to pattern Mod I  Special Tests: Tinetti Balance Assessment 28/28, TUG x 10 seconds, 5x STS x 10 seconds. Recommendations: Continuation of HEP for improved strengthening, endurance & improvments to functional mobility. PATIENT RESPONSE TO TREATMENT: Tinetti Balance Assessment 28/28, TUG x 10 seconds, 5x STS x 10 seconds. PATIENT LEVEL OF UNDERSTANDING OF EDUCATION: Verbal acknowledgement of safety and fall prevention strategies. CONTINUED NEED FOR THE FOLLOWING SKILLS: HH PT is medically necessary to address cervical pain, decreased ROM, decreased strength, increased swelling, impaired bed mobility, decreased independence with functional transfers, impaired gait, impaired stair negotiation, and impaired balance in order to improve functional independence, quality of life, return to PLOF, reduce the risk for falls, and reduce pain. PLAN: Review of safety with probable DC. DISCHARGE PLANNING DISCUSSED: Patient to continue HHPT 3w1 at this time.

## 2023-08-11 ENCOUNTER — HOME CARE VISIT (OUTPATIENT)
Age: 58
End: 2023-08-11
Payer: COMMERCIAL

## 2023-08-11 VITALS
OXYGEN SATURATION: 98 % | DIASTOLIC BLOOD PRESSURE: 82 MMHG | HEART RATE: 81 BPM | TEMPERATURE: 97.8 F | RESPIRATION RATE: 16 BRPM | SYSTOLIC BLOOD PRESSURE: 128 MMHG

## 2023-08-11 PROCEDURE — G0151 HHCP-SERV OF PT,EA 15 MIN: HCPCS

## 2023-08-11 ASSESSMENT — ENCOUNTER SYMPTOMS: PAIN LOCATION - PAIN QUALITY: STING

## 2023-08-11 NOTE — HOME HEALTH
precautions. Patient response to treatment:  tolerated well. Caregiver involvement/assistance needed: wife and sons (name of caregiver) assist with ADLs,  iADLs, transportation. A:  Patient made significant functional gains as result to therapy this date as evidenced by improved TUG scores and performance of 6MWT and FTSTS. Pt has met PT goals in home care setting. P: DC PT and pt agreed. Pt was instructed to continue with HEP and ambulation. F/u visit with neurosurgeon Dr. Trisha Quinones is on at 11 am this morning.   Informed Dr. Trisha Quinones  via EPIC regarding DC from home care PT.

## 2023-08-17 ASSESSMENT — ENCOUNTER SYMPTOMS: DYSPNEA ACTIVITY LEVEL: AFTER AMBULATING MORE THAN 20 FT

## 2023-11-14 ENCOUNTER — OFFICE VISIT (OUTPATIENT)
Age: 58
End: 2023-11-14

## 2023-11-14 VITALS — BODY MASS INDEX: 31.1 KG/M2 | HEIGHT: 71 IN | RESPIRATION RATE: 20 BRPM

## 2023-11-14 DIAGNOSIS — G89.29 CHRONIC LEFT SHOULDER PAIN: ICD-10-CM

## 2023-11-14 DIAGNOSIS — M25.512 CHRONIC LEFT SHOULDER PAIN: ICD-10-CM

## 2023-11-14 DIAGNOSIS — M75.102 NONTRAUMATIC TEAR OF LEFT ROTATOR CUFF, UNSPECIFIED TEAR EXTENT: Primary | ICD-10-CM

## 2023-11-28 ENCOUNTER — HOSPITAL ENCOUNTER (OUTPATIENT)
Facility: HOSPITAL | Age: 58
Discharge: HOME | End: 2023-12-01
Attending: ORTHOPAEDIC SURGERY
Payer: COMMERCIAL

## 2023-11-28 DIAGNOSIS — M75.102 NONTRAUMATIC TEAR OF LEFT ROTATOR CUFF, UNSPECIFIED TEAR EXTENT: ICD-10-CM

## 2023-11-28 PROCEDURE — 73221 MRI JOINT UPR EXTREM W/O DYE: CPT

## 2023-12-05 ENCOUNTER — OFFICE VISIT (OUTPATIENT)
Age: 58
End: 2023-12-05
Payer: COMMERCIAL

## 2023-12-05 VITALS — BODY MASS INDEX: 31.1 KG/M2 | RESPIRATION RATE: 18 BRPM | HEIGHT: 71 IN

## 2023-12-05 DIAGNOSIS — M75.102 ROTATOR CUFF SYNDROME, LEFT: Primary | ICD-10-CM

## 2023-12-05 PROCEDURE — 99214 OFFICE O/P EST MOD 30 MIN: CPT | Performed by: ORTHOPAEDIC SURGERY

## 2023-12-05 PROCEDURE — 20610 DRAIN/INJ JOINT/BURSA W/O US: CPT | Performed by: ORTHOPAEDIC SURGERY

## 2023-12-05 RX ORDER — TRIAMCINOLONE ACETONIDE 40 MG/ML
40 INJECTION, SUSPENSION INTRA-ARTICULAR; INTRAMUSCULAR ONCE
Status: COMPLETED | OUTPATIENT
Start: 2023-12-05 | End: 2023-12-05

## 2023-12-05 RX ADMIN — TRIAMCINOLONE ACETONIDE 40 MG: 40 INJECTION, SUSPENSION INTRA-ARTICULAR; INTRAMUSCULAR at 15:21

## 2024-02-28 ENCOUNTER — OFFICE VISIT (OUTPATIENT)
Age: 59
End: 2024-02-28
Payer: COMMERCIAL

## 2024-02-28 VITALS — HEIGHT: 71 IN | BODY MASS INDEX: 31.1 KG/M2 | RESPIRATION RATE: 16 BRPM

## 2024-02-28 DIAGNOSIS — M75.112 NONTRAUMATIC INCOMPLETE TEAR OF LEFT ROTATOR CUFF: Primary | ICD-10-CM

## 2024-02-28 DIAGNOSIS — M75.22 TENDINITIS OF LONG HEAD OF BICEPS BRACHII OF LEFT SHOULDER: ICD-10-CM

## 2024-02-28 PROCEDURE — 99214 OFFICE O/P EST MOD 30 MIN: CPT | Performed by: ORTHOPAEDIC SURGERY

## 2024-02-28 NOTE — PROGRESS NOTES
[facility] AMBULATORY OFFICE NOTE    Patient: Kevin Upton                MRN: [id number]       SSN: [id number]  YOB: 1965        AGE: 58 y.o.        SEX: male  Body mass index is 31.1 kg/m².    PCP: Regan Bonilla PA  02/28/24    Chief Complaint: Left shoulder pain    HPI: Kevin Upton is a 58 y.o. male patient who returns today for his left shoulder.  He continues to have left shoulder pain.  Pain is worse with use.  This has not gotten better with an injection or exercises.    Past Medical History:   Diagnosis Date    Arthritis     Chronic pain     knees, back, hands    Degenerative arthritis of right knee     Hypertension     Sleep apnea     no cpap       No family history on file.    Current Outpatient Medications   Medication Sig Dispense Refill    naloxone 4 MG/0.1ML LIQD nasal spray  (Patient not taking: Reported on 8/1/2023)      vitamin D (ERGOCALCIFEROL) 1.25 MG (51036 UT) CAPS capsule TAKE 1 CAPSULE BY MOUTH WEEKLY (Patient not taking: Reported on 12/5/2023)      oxyCODONE-acetaminophen (PERCOCET) 5-325 MG per tablet Take 1 tablet by mouth every 6 hours as needed. Max Daily Amount: 4 tablets (Patient not taking: Reported on 6/8/2023)      traMADol (ULTRAM) 50 MG tablet Take 1 tablet by mouth every 6 hours as needed. Max Daily Amount: 200 mg (Patient not taking: Reported on 6/8/2023)      topiramate (TOPAMAX) 25 MG tablet Take 1 in the evening for 1 week, then increase to 2 the second week and continue with 3 in the evening (Patient not taking: Reported on 12/5/2023) 90 tablet 1    gabapentin (NEURONTIN) 300 MG capsule 1 in the morning and 2 in the evening as directed (Patient not taking: Reported on 6/8/2023) 90 capsule 1    diclofenac (VOLTAREN) 75 MG EC tablet Take 1 tablet by mouth 2 times daily (with meals) Take as needed for pain (Patient not taking: Reported on 12/5/2023) 60 tablet 1    amLODIPine (NORVASC) 10 MG tablet Take 10 mg by mouth

## 2024-03-12 ENCOUNTER — TELEPHONE (OUTPATIENT)
Age: 59
End: 2024-03-12

## 2024-03-18 DIAGNOSIS — Z01.818 PREOPERATIVE TESTING: ICD-10-CM

## 2024-03-18 DIAGNOSIS — M75.22 TENDINITIS OF LONG HEAD OF BICEPS BRACHII OF LEFT SHOULDER: ICD-10-CM

## 2024-03-18 DIAGNOSIS — Z01.810 PREOP CARDIOVASCULAR EXAM: ICD-10-CM

## 2024-03-18 DIAGNOSIS — M75.112 NONTRAUMATIC INCOMPLETE TEAR OF LEFT ROTATOR CUFF: Primary | ICD-10-CM

## 2024-03-26 ENCOUNTER — HOSPITAL ENCOUNTER (OUTPATIENT)
Facility: HOSPITAL | Age: 59
Discharge: HOME | End: 2024-03-29
Payer: COMMERCIAL

## 2024-03-26 LAB
ALBUMIN SERPL-MCNC: 3.7 G/DL (ref 3.4–5)
ALBUMIN/GLOB SERPL: 1 (ref 0.8–1.7)
ALP SERPL-CCNC: 77 U/L (ref 45–117)
ALT SERPL-CCNC: 25 U/L (ref 16–61)
ANION GAP SERPL CALC-SCNC: 6 MMOL/L (ref 3–18)
AST SERPL-CCNC: 12 U/L (ref 10–38)
BASOPHILS # BLD: 0.1 K/UL (ref 0–0.1)
BASOPHILS NFR BLD: 1 % (ref 0–2)
BILIRUB SERPL-MCNC: 0.8 MG/DL (ref 0.2–1)
BUN SERPL-MCNC: 18 MG/DL (ref 7–18)
BUN/CREAT SERPL: 13 (ref 12–20)
CALCIUM SERPL-MCNC: 9.7 MG/DL (ref 8.5–10.1)
CHLORIDE SERPL-SCNC: 103 MMOL/L (ref 100–111)
CO2 SERPL-SCNC: 28 MMOL/L (ref 21–32)
CREAT SERPL-MCNC: 1.37 MG/DL (ref 0.6–1.3)
DIFFERENTIAL METHOD BLD: ABNORMAL
EKG ATRIAL RATE: 81 BPM
EKG DIAGNOSIS: NORMAL
EKG P AXIS: 43 DEGREES
EKG P-R INTERVAL: 172 MS
EKG Q-T INTERVAL: 330 MS
EKG QRS DURATION: 86 MS
EKG QTC CALCULATION (BAZETT): 383 MS
EKG R AXIS: 25 DEGREES
EKG T AXIS: -50 DEGREES
EKG VENTRICULAR RATE: 81 BPM
EOSINOPHIL # BLD: 0.1 K/UL (ref 0–0.4)
EOSINOPHIL NFR BLD: 2 % (ref 0–5)
ERYTHROCYTE [DISTWIDTH] IN BLOOD BY AUTOMATED COUNT: 14.7 % (ref 11.6–14.5)
GLOBULIN SER CALC-MCNC: 3.7 G/DL (ref 2–4)
GLUCOSE SERPL-MCNC: 97 MG/DL (ref 74–99)
HCT VFR BLD AUTO: 38.8 % (ref 36–48)
HGB BLD-MCNC: 13.6 G/DL (ref 13–16)
IMM GRANULOCYTES # BLD AUTO: 0.1 K/UL (ref 0–0.04)
IMM GRANULOCYTES NFR BLD AUTO: 1 % (ref 0–0.5)
LYMPHOCYTES # BLD: 2.5 K/UL (ref 0.9–3.6)
LYMPHOCYTES NFR BLD: 31 % (ref 21–52)
MCH RBC QN AUTO: 26.9 PG (ref 24–34)
MCHC RBC AUTO-ENTMCNC: 35.1 G/DL (ref 31–37)
MCV RBC AUTO: 76.8 FL (ref 78–100)
MONOCYTES # BLD: 0.6 K/UL (ref 0.05–1.2)
MONOCYTES NFR BLD: 7 % (ref 3–10)
NEUTS SEG # BLD: 4.6 K/UL (ref 1.8–8)
NEUTS SEG NFR BLD: 59 % (ref 40–73)
NRBC # BLD: 0 K/UL (ref 0–0.01)
NRBC BLD-RTO: 0 PER 100 WBC
PLATELET # BLD AUTO: 312 K/UL (ref 135–420)
PMV BLD AUTO: 10.5 FL (ref 9.2–11.8)
POTASSIUM SERPL-SCNC: 4.2 MMOL/L (ref 3.5–5.5)
PROT SERPL-MCNC: 7.4 G/DL (ref 6.4–8.2)
RBC # BLD AUTO: 5.05 M/UL (ref 4.35–5.65)
SODIUM SERPL-SCNC: 137 MMOL/L (ref 136–145)
WBC # BLD AUTO: 7.8 K/UL (ref 4.6–13.2)

## 2024-03-26 PROCEDURE — 93005 ELECTROCARDIOGRAM TRACING: CPT | Performed by: ORTHOPAEDIC SURGERY

## 2024-03-26 PROCEDURE — 85025 COMPLETE CBC W/AUTO DIFF WBC: CPT

## 2024-03-26 PROCEDURE — 36415 COLL VENOUS BLD VENIPUNCTURE: CPT

## 2024-03-26 PROCEDURE — 80053 COMPREHEN METABOLIC PANEL: CPT

## 2024-03-28 DIAGNOSIS — M75.112 NONTRAUMATIC INCOMPLETE TEAR OF LEFT ROTATOR CUFF: Primary | ICD-10-CM

## 2024-03-28 RX ORDER — OXYCODONE HYDROCHLORIDE AND ACETAMINOPHEN 5; 325 MG/1; MG/1
1-2 TABLET ORAL EVERY 4 HOURS PRN
Qty: 32 TABLET | Refills: 0 | Status: SHIPPED | OUTPATIENT
Start: 2024-03-28 | End: 2024-04-04

## 2024-04-02 ENCOUNTER — TELEPHONE (OUTPATIENT)
Age: 59
End: 2024-04-02

## 2024-04-02 DIAGNOSIS — M75.112 NONTRAUMATIC INCOMPLETE TEAR OF LEFT ROTATOR CUFF: ICD-10-CM

## 2024-04-02 RX ORDER — OXYCODONE HYDROCHLORIDE AND ACETAMINOPHEN 5; 325 MG/1; MG/1
1 TABLET ORAL EVERY 6 HOURS PRN
Qty: 28 TABLET | Refills: 0 | Status: SHIPPED | OUTPATIENT
Start: 2024-04-04 | End: 2024-04-11

## 2024-04-02 NOTE — TELEPHONE ENCOUNTER
Refilled. Patient should not take this with his tramadol (I see this on his list) or other narcotic medicaiton. He should continue to use motrin & ice as well. Please advise. Thanks.

## 2024-04-02 NOTE — TELEPHONE ENCOUNTER
Patient requesting refill of oxyCODONE-acetaminophen (PERCOCET) 5-325 MG per tablet called in to [facility] on [address].

## 2024-04-05 DIAGNOSIS — M75.102 ROTATOR CUFF SYNDROME, LEFT: Primary | ICD-10-CM

## 2024-04-05 DIAGNOSIS — M25.512 ACUTE PAIN OF LEFT SHOULDER: ICD-10-CM

## 2024-04-05 RX ORDER — OXYCODONE HYDROCHLORIDE 5 MG/1
5 TABLET ORAL EVERY 6 HOURS PRN
Qty: 28 TABLET | Refills: 0 | Status: SHIPPED | OUTPATIENT
Start: 2024-04-05 | End: 2024-04-12

## 2024-04-05 NOTE — TELEPHONE ENCOUNTER
Patient states he was told by his pharmacy the they're not able fill his rx because the supervising physicians name isn't listed on the rx. Patient is asking if his pharmacy can be called to verify who the supervising physician is, or if a new rx can be sent to his pharmacy with the supervising physicians name listed. Patient uses [facility] in [place] on [address].    Patient can be reached [phone].

## 2024-04-09 ENCOUNTER — OFFICE VISIT (OUTPATIENT)
Age: 59
End: 2024-04-09

## 2024-04-09 DIAGNOSIS — M75.22 TENDINITIS OF LONG HEAD OF BICEPS BRACHII OF LEFT SHOULDER: ICD-10-CM

## 2024-04-09 DIAGNOSIS — M75.112 NONTRAUMATIC INCOMPLETE TEAR OF LEFT ROTATOR CUFF: Primary | ICD-10-CM

## 2024-04-09 PROCEDURE — 99024 POSTOP FOLLOW-UP VISIT: CPT | Performed by: ORTHOPAEDIC SURGERY

## 2024-04-09 NOTE — PROGRESS NOTES
[facility] AMBULATORY OFFICE NOTE    Patient: Kevin Upton                MRN: [id number]       SSN: [id number]  YOB: 1965        AGE: 58 y.o.        SEX: male  There is no height or weight on file to calculate BMI.    PCP: Regan Bonilla PA  04/09/24    Chief Complaint: Left shoulder follow-up    HPI: Kevin pUton is a 58 y.o. male patient who returns today 2 weeks postop from his left shoulder surgery.  He is doing well.  Pain is 2.    Past Medical History:   Diagnosis Date    Arthritis     Chronic pain     knees, back, hands    Degenerative arthritis of right knee     Hypertension     Sleep apnea     no cpap       No family history on file.    Current Outpatient Medications   Medication Sig Dispense Refill    oxyCODONE (ROXICODONE) 5 MG immediate release tablet Take 1 tablet by mouth every 6 hours as needed for Pain for up to 7 days. Intended supply: 7 days. Take lowest dose possible to manage pain Max Daily Amount: 20 mg 28 tablet 0    oxyCODONE-acetaminophen (PERCOCET) 5-325 MG per tablet Take 1 tablet by mouth every 6 hours as needed for Pain for up to 7 days. Intended supply: 7 days. Take lowest dose possible to manage pain Max Daily Amount: 4 tablets 28 tablet 0    naloxone 4 MG/0.1ML LIQD nasal spray  (Patient not taking: Reported on 8/1/2023)      vitamin D (ERGOCALCIFEROL) 1.25 MG (38022 UT) CAPS capsule TAKE 1 CAPSULE BY MOUTH WEEKLY (Patient not taking: Reported on 12/5/2023)      topiramate (TOPAMAX) 25 MG tablet Take 1 in the evening for 1 week, then increase to 2 the second week and continue with 3 in the evening (Patient not taking: Reported on 12/5/2023) 90 tablet 1    gabapentin (NEURONTIN) 300 MG capsule 1 in the morning and 2 in the evening as directed (Patient not taking: Reported on 6/8/2023) 90 capsule 1    diclofenac (VOLTAREN) 75 MG EC tablet Take 1 tablet by mouth 2 times daily (with meals) Take as needed for pain (Patient not

## 2024-04-10 ENCOUNTER — HOSPITAL ENCOUNTER (OUTPATIENT)
Facility: HOSPITAL | Age: 59
Setting detail: RECURRING SERIES
Discharge: HOME | End: 2024-04-13
Payer: COMMERCIAL

## 2024-04-10 PROCEDURE — 97162 PT EVAL MOD COMPLEX 30 MIN: CPT

## 2024-04-10 PROCEDURE — 97535 SELF CARE MNGMENT TRAINING: CPT

## 2024-04-10 NOTE — THERAPY EVALUATION
[facility]  [address] [place] [address]  Phone: [phone]   [phone]  Plan of Care / Statement of Necessity for Physical Therapy Services     Patient Name: Kevin Upton : 1965   Medical   Diagnosis: Left shoulder pain [M25.512] Treatment Diagnosis:  M25.512  LEFT SHOULDER PAIN    Onset Date: DOS: 3/28/2024     Referral Source: Leobardo Olsen MD Start of Care (SOC): 4/10/2024   Prior Hospitalization: See medical history Provider #: [id number]   Prior Level of Function:  Ambidextrous, prior to surgery pt able to perform housework, yardwork, ADLs without difficulty   Comorbidities: PSHx: cervical fusion , R knee menisectomy , R RCR with bicep tenodesis , L knee menisectomy , R knee menisectomy , back surgery , R elbow surgery ; PMHx: HTN     Assessment / key information: Pt is a 59 y/o M who presents to PT w/ c/o L shoulder pain s/p L RCR with bicep tenodesis (DOS 3/28/2024). Since surgery, pt noting pain ranges 0 to 10/10, made worse with movement; better with oxycodone, ice, rest. Describes pain as stabbing, located anterior shoulder. Reports poor compliance to sling use- removed pillow himself and is not wearing at home or to sleep. Heavy education provided to patient regarding importance of compliance to sling wear w/ pillow at all times. Prior treatment as including injections pre-operatively without any improvement.  FOTO .     Clinical Exam Findings:  POSTURE/OBSERVATION: 6 surgical portals, steri-strips covering, no signs of infection noted.    STRENGTH AROM PROM   Shoulder Left Right Left  (Seated) Right  (Seated) Left Right   Flexion NT 4/5   140    Extension NT 4/5 NT 50      Abduction NT 4/5   122    ER (scap plane)  NT  4+/5 NT 42  20 p!    IR (scap plane)  NT  5/5 NT FIR T10  to abdomen    Elbow Left Right Left Right Left Right   Extension NT 5/5 NT 0 0    Flexion

## 2024-04-10 NOTE — THERAPY EVALUATION
PHYSICAL / OCCUPATIONAL THERAPY - DAILY TREATMENT NOTE (updated )  For Eval visit    Patient Name: Kevin Upton    Date: 4/10/2024    : 1965  Insurance: Payor: BCBS / Plan: BCBS OUT OF STATE / Product Type: *No Product type* /      Patient  verified yes     Visit #   Current / Total 1 12   Time   In / Out 220 2:56   Pain   In / Out 0/10 0/10   Subjective Functional Status/Changes: See POC     TREATMENT AREA =  see POC    OBJECTIVE           26 min   Eval - untimed                      Therapeutic Procedures:    Tx Min Billable or 1:1 Min (if diff from Tx Min) Procedure, Rationale, Specifics   10  97911 Self Care/Home Management (timed):  improve patient knowledge and understanding of positioning, posture/ergonomics, home safety, activity modification, diagnosis/prognosis, and physical therapy expectations, procedures and progression  to improve patient's ability to progress to PLOF and address remaining functional goals.  (see flow sheet as applicable)     Details if applicable:  reviewed importance of adherence to all post-op restrictions including sling-wear at all times, PROM only, and avoiding carrying/holding anything in L UE. Educated in HEP, pt return demos understanding            Details if applicable:            Details if applicable:            Details if applicable:            Details if applicable:     36  [facility] BC Totals Reminder: bill using total billable min of TIMED therapeutic procedures (example: do not include dry needle or estim unattended, both untimed codes, in totals to left)  8-22 min = 1 unit; 23-37 min = 2 units; 38-52 min = 3 units; 53-67 min = 4 units; 68-82 min = 5 units   Total Total     [x]  Patient Education billed concurrently with other procedures   [x] Review HEP    [] Progressed/Changed HEP, detail:    [] Other detail:       Objective Information/Functional Measures/Assessment    See POC    Patient will continue to benefit from skilled PT / OT services to modify

## 2024-04-11 ENCOUNTER — TELEPHONE (OUTPATIENT)
Age: 59
End: 2024-04-11

## 2024-04-11 DIAGNOSIS — M75.112 NONTRAUMATIC INCOMPLETE TEAR OF LEFT ROTATOR CUFF: Primary | ICD-10-CM

## 2024-04-12 ENCOUNTER — HOSPITAL ENCOUNTER (OUTPATIENT)
Facility: HOSPITAL | Age: 59
Setting detail: RECURRING SERIES
Discharge: HOME | End: 2024-04-15
Payer: COMMERCIAL

## 2024-04-12 PROCEDURE — 97110 THERAPEUTIC EXERCISES: CPT

## 2024-04-12 PROCEDURE — 97140 MANUAL THERAPY 1/> REGIONS: CPT

## 2024-04-12 PROCEDURE — 97112 NEUROMUSCULAR REEDUCATION: CPT

## 2024-04-12 RX ORDER — OXYCODONE HYDROCHLORIDE 5 MG/1
5 TABLET ORAL EVERY 6 HOURS PRN
Qty: 28 TABLET | Refills: 0 | Status: SHIPPED | OUTPATIENT
Start: 2024-04-12 | End: 2024-04-19

## 2024-04-12 NOTE — PROGRESS NOTES
5/16/2024  9:40 AM Benny Guardado, [facility]   5/21/2024 10:30 AM Leobardo Olsen MD [facility] BS AMB

## 2024-04-15 ENCOUNTER — TELEPHONE (OUTPATIENT)
Facility: HOSPITAL | Age: 59
End: 2024-04-15

## 2024-04-15 NOTE — TELEPHONE ENCOUNTER
Provider Cancellation. 4/16, 4/18, 4/23 appts cancelled. Per Nicole, Dr. Olsen wants patient to wait to start PT until 4 weeks post op, which is 4/25. Unable to reach patient, left detailed message regarding the appt cancellations.

## 2024-04-16 ENCOUNTER — APPOINTMENT (OUTPATIENT)
Facility: HOSPITAL | Age: 59
End: 2024-04-16
Payer: COMMERCIAL

## 2024-04-18 ENCOUNTER — APPOINTMENT (OUTPATIENT)
Facility: HOSPITAL | Age: 59
End: 2024-04-18
Payer: COMMERCIAL

## 2024-04-22 ENCOUNTER — TELEPHONE (OUTPATIENT)
Age: 59
End: 2024-04-22

## 2024-04-22 DIAGNOSIS — M75.112 NONTRAUMATIC INCOMPLETE TEAR OF LEFT ROTATOR CUFF: Primary | ICD-10-CM

## 2024-04-22 RX ORDER — OXYCODONE HYDROCHLORIDE 5 MG/1
5 TABLET ORAL EVERY 6 HOURS PRN
Qty: 28 TABLET | Refills: 0 | Status: SHIPPED | OUTPATIENT
Start: 2024-04-22 | End: 2024-04-29

## 2024-04-22 NOTE — TELEPHONE ENCOUNTER
4/22/24 Patient called today. Left message on machine that he can  his rx at his pharmacy on file.

## 2024-04-22 NOTE — TELEPHONE ENCOUNTER
Post op patient called and is asking for a refill on the Oxycodone medication from .    [facility] on [address]  Tel. [phone]    Patient tel. [phone].    Note :patient last seen on 4/9/24. Next appt on 5/21/24 with  for the Left Shoulder.

## 2024-04-23 ENCOUNTER — APPOINTMENT (OUTPATIENT)
Facility: HOSPITAL | Age: 59
End: 2024-04-23
Payer: COMMERCIAL

## 2024-04-26 ENCOUNTER — HOSPITAL ENCOUNTER (OUTPATIENT)
Facility: HOSPITAL | Age: 59
Setting detail: RECURRING SERIES
Discharge: HOME | End: 2024-04-29
Payer: COMMERCIAL

## 2024-04-26 PROCEDURE — 97110 THERAPEUTIC EXERCISES: CPT

## 2024-04-26 PROCEDURE — 97140 MANUAL THERAPY 1/> REGIONS: CPT

## 2024-04-26 PROCEDURE — 97112 NEUROMUSCULAR REEDUCATION: CPT

## 2024-04-26 NOTE — PROGRESS NOTES
AM Mercy Stephen, PTA [facility]   4/30/2024  9:40 AM Audra Garcias, PTA [facility]   5/2/2024  9:40 AM Meliton Constantino, PT [facility]   5/7/2024  9:40 AM Audra Garcias, PTA [facility]   5/9/2024  9:40 AM Meliton Constantino, PT [facility]   5/14/2024  9:40 AM Audra Garcias, PTA [facility]   5/16/2024  9:40 AM Benny Guardado, PTA [facility]   5/21/2024 10:30 AM Leobardo Olsen MD [facility] BS AMB

## 2024-04-30 ENCOUNTER — HOSPITAL ENCOUNTER (OUTPATIENT)
Facility: HOSPITAL | Age: 59
Setting detail: RECURRING SERIES
Discharge: HOME | End: 2024-05-03
Payer: COMMERCIAL

## 2024-04-30 PROCEDURE — 97140 MANUAL THERAPY 1/> REGIONS: CPT

## 2024-04-30 PROCEDURE — 97110 THERAPEUTIC EXERCISES: CPT

## 2024-04-30 PROCEDURE — 97112 NEUROMUSCULAR REEDUCATION: CPT

## 2024-04-30 NOTE — PROGRESS NOTES
PHYSICAL / OCCUPATIONAL THERAPY - DAILY TREATMENT NOTE    Patient Name: Kevin Upton    Date: 2024    : 1965  Insurance: Payor: BCBS / Plan: BCBS OUT OF STATE / Product Type: *No Product type* /      Patient  verified Yes     Visit #   Current / Total 4 12   Time   In / Out 9:44 10:32   Pain   In / Out 4 0   Subjective Functional Status/Changes: Pt states shoulder is sore today     TREATMENT AREA =  Left shoulder pain [M25.512]    OBJECTIVE    Ice (UNBILLED):  location/position: left shoulder/ supine with wedge     Min Rationale   10 decrease pain to improve patient's ability to progress to PLOF and address remaining functional goals.     Skin assessment post-treatment:   Intact     Therapeutic Procedures:    05812 Therapeutic Exercise (timed):  increase ROM, strength, coordination, balance, and proprioception to improve patient's ability to progress to PLOF and address remaining functional goals.   Tx Min Billable or 1:1 Min   (if diff from Tx Min) Details:   20  See flow sheet as applicable     02586 Neuromuscular Re-Education (timed):  improve balance, coordination, kinesthetic sense, posture, core stability and proprioception to improve patient's ability to develop conscious control of individual muscles and awareness of position of extremities in order to progress to PLOF and address remaining functional goals.   Tx Min Billable or 1:1 Min   (if diff from Tx Min) Details:   8  See flow sheet as applicable     93408 Manual Therapy (timed):  decrease pain, increase ROM, and increase tissue extensibility to improve patient's ability to progress to PLOF and address remaining functional goals.  The manual therapy interventions were performed at a separate and distinct time from the therapeutic activities interventions .   Tx Min Billable or 1:1 Min   (if diff from Tx Min) Details:   10  In S/L: gentle scap clocks, DTM to medial border of scap. In supine: Gentle GH distraction and mobs with PROM

## 2024-05-01 ENCOUNTER — TELEPHONE (OUTPATIENT)
Age: 59
End: 2024-05-01

## 2024-05-01 DIAGNOSIS — M75.112 NONTRAUMATIC INCOMPLETE TEAR OF LEFT ROTATOR CUFF: Primary | ICD-10-CM

## 2024-05-01 RX ORDER — OXYCODONE HYDROCHLORIDE 5 MG/1
5 TABLET ORAL EVERY 8 HOURS PRN
Qty: 21 TABLET | Refills: 0 | Status: SHIPPED | OUTPATIENT
Start: 2024-05-01 | End: 2024-05-08

## 2024-05-01 NOTE — TELEPHONE ENCOUNTER
Patient called to request a refill of oxyCODONE (ROXICODONE) 5 MG immediate release tablet be sent to [facility]/pharmacy #[id number] - [place], [place] - [address].    Patient states everything is going well, but at night he still has some breakthrough pain which is why he is requesting this refill. He states it seems to be getting better and he thinks this may be the last time he needs it.    Please review and advise patient, [phone]

## 2024-05-02 ENCOUNTER — HOSPITAL ENCOUNTER (OUTPATIENT)
Facility: HOSPITAL | Age: 59
Setting detail: RECURRING SERIES
Discharge: HOME | End: 2024-05-05
Payer: COMMERCIAL

## 2024-05-02 PROCEDURE — 97140 MANUAL THERAPY 1/> REGIONS: CPT | Performed by: PHYSICAL THERAPIST

## 2024-05-02 PROCEDURE — 97112 NEUROMUSCULAR REEDUCATION: CPT | Performed by: PHYSICAL THERAPIST

## 2024-05-02 PROCEDURE — 97110 THERAPEUTIC EXERCISES: CPT | Performed by: PHYSICAL THERAPIST

## 2024-05-02 NOTE — PROGRESS NOTES
PHYSICAL / OCCUPATIONAL THERAPY - DAILY TREATMENT NOTE    Patient Name: Kevin Upton    Date: 2024    : 1965  Insurance: Payor: BCBS / Plan: BCBS OUT OF STATE / Product Type: *No Product type* /      Patient  verified Yes     Visit #   Current / Total 5 12   Time   In / Out 9:38 10:31   Pain   In / Out 1-2 0   Subjective Functional Status/Changes: Patient notes mild level of pain in the anterior left shoulder.     TREATMENT AREA =  Left shoulder pain [M25.512]    OBJECTIVE    Ice (UNBILLED):  location/position: left shoulder/sitting  Min Rationale   10 decrease inflammation and decrease pain to improve patient's ability to progress to PLOF and address remaining functional goals.     Skin assessment post-treatment:   Intact     Therapeutic Procedures:    22039 Therapeutic Exercise (timed):  increase ROM, strength, coordination, balance, and proprioception to improve patient's ability to progress to PLOF and address remaining functional goals.   Tx Min Billable or 1:1 Min   (if diff from Tx Min) Details:   23 22 See flow sheet as applicable     38369 Neuromuscular Re-Education (timed):  improve balance, coordination, kinesthetic sense, posture, core stability and proprioception to improve patient's ability to develop conscious control of individual muscles and awareness of position of extremities in order to progress to PLOF and address remaining functional goals.   Tx Min Billable or 1:1 Min   (if diff from Tx Min) Details:   10 10 See flow sheet as applicable     27424 Manual Therapy (timed):  decrease pain, increase ROM, and increase tissue extensibility to improve patient's ability to progress to PLOF and address remaining functional goals.  The manual therapy interventions were performed at a separate and distinct time from the therapeutic activities interventions .   Tx Min Billable or 1:1 Min   (if diff from Tx Min) Details:   10 10 Manual stretching to increase elevation and rotation.

## 2024-05-07 ENCOUNTER — HOSPITAL ENCOUNTER (OUTPATIENT)
Facility: HOSPITAL | Age: 59
Setting detail: RECURRING SERIES
Discharge: HOME | End: 2024-05-10
Payer: COMMERCIAL

## 2024-05-07 PROCEDURE — 97110 THERAPEUTIC EXERCISES: CPT

## 2024-05-07 PROCEDURE — 97112 NEUROMUSCULAR REEDUCATION: CPT

## 2024-05-07 PROCEDURE — 97140 MANUAL THERAPY 1/> REGIONS: CPT
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
## 2024-05-09 ENCOUNTER — HOSPITAL ENCOUNTER (OUTPATIENT)
Facility: HOSPITAL | Age: 59
Setting detail: RECURRING SERIES
Discharge: HOME | End: 2024-05-12
Payer: COMMERCIAL

## 2024-05-09 PROCEDURE — 97110 THERAPEUTIC EXERCISES: CPT | Performed by: PHYSICAL THERAPIST

## 2024-05-09 PROCEDURE — 97112 NEUROMUSCULAR REEDUCATION: CPT | Performed by: PHYSICAL THERAPIST

## 2024-05-09 PROCEDURE — 97530 THERAPEUTIC ACTIVITIES: CPT | Performed by: PHYSICAL THERAPIST

## 2024-05-09 NOTE — PROGRESS NOTES
PHYSICAL / OCCUPATIONAL THERAPY - DAILY TREATMENT NOTE    Patient Name: Kevin Upton    Date: 2024    : 1965  Insurance: Payor: BCBS / Plan: BCBS OUT OF STATE / Product Type: *No Product type* /      Patient  verified Yes     Visit #   Current / Total 7 12   Time   In / Out 9:40 10:30   Pain   In / Out 5 \"sore\" 0   Subjective Functional Status/Changes: Patient denies \"pain\", states he has soreness only.  Eager to begin active exercises.  He is currently 6 weeks post op.     TREATMENT AREA =  Left shoulder pain [M25.512]    OBJECTIVE    Ice (UNBILLED):  location/position: left shoulder/sitting     Min Rationale   10 decrease inflammation and decrease pain to improve patient's ability to progress to PLOF and address remaining functional goals.     Skin assessment post-treatment:   Intact     Therapeutic Procedures:    76713 Therapeutic Exercise (timed):  increase ROM, strength, coordination, balance, and proprioception to improve patient's ability to progress to PLOF and address remaining functional goals.   Tx Min Billable or 1:1 Min   (if diff from Tx Min) Details:   20  See flow sheet as applicable     34468 Neuromuscular Re-Education (timed):  improve balance, coordination, kinesthetic sense, posture, core stability and proprioception to improve patient's ability to develop conscious control of individual muscles and awareness of position of extremities in order to progress to PLOF and address remaining functional goals.   Tx Min Billable or 1:1 Min   (if diff from Tx Min) Details:   10  See flow sheet as applicable     53129 Therapeutic Activity (timed):  use of dynamic activities replicating functional movements to increase ROM, strength, coordination, balance, and proprioception in order to improve patient's ability to progress to PLOF and address remaining functional goals.   Tx Min Billable or 1:1 Min   (if diff from Tx Min) Details:   10  See flow sheet as applicable       40  [facility] Totals 
adhesions  Status at eval: PROM flex 140, abd 122, ER 22 p!  PN:  progressing,  L flexion: 160 deg, L abd: 145 deg, L ER: 75 deg, IR 45.  5/8/24  2.Pt to demo L shoulder AROM flex, abd to 120 deg to improve ability to reach overhead  Status at eval: AROM not tested due to recency of surgery  PN:  Patient still working in [employer].  Will progress to AROM per protocol.  5/8/2024.  No change.  3. Improve L shoulder FIR AROM to at least L2 to improve ease of self-care  Status at eval: AROM not tested  PN:  FIR AROM to left buttock.  5/8/2024.  Progressing.  4. Improve FOTO Score to >/= 64/100 to indicate improved function  Status at eval: 21/100  PN:  FOTO=53.  5/8/2024.  Progressing.    Frequency / Duration:   Patient to be seen   2   times per week for   4    WEEKS    RECOMMENDATIONS  Patient would benefit from the continuation of skilled rehab interventions for functional progress to achieving above stated clinically significant goals.  Continue per initial Plan of Care.    If you have any questions/comments please contact us directly.  Thank you for allowing us to assist in the care of your patient.    Meliton Constantino, PT       5/9/2024       12:26 PM

## 2024-05-14 ENCOUNTER — HOSPITAL ENCOUNTER (OUTPATIENT)
Facility: HOSPITAL | Age: 59
Setting detail: RECURRING SERIES
Discharge: HOME | End: 2024-05-17
Payer: COMMERCIAL

## 2024-05-14 PROCEDURE — 97110 THERAPEUTIC EXERCISES: CPT

## 2024-05-14 NOTE — PROGRESS NOTES
PHYSICAL / OCCUPATIONAL THERAPY - DAILY TREATMENT NOTE    Patient Name: Kevin Upton    Date: 2024    : 1965  Insurance: Payor: BCBS / Plan: BCBS OUT OF STATE / Product Type: *No Product type* /      Patient  verified Yes     Visit #   Current / Total 1 8   Time   In / Out 9:40 9:51   Pain   In / Out 0 0   Subjective Functional Status/Changes: Pt states he had to go to the hospital yesterday because his BP was 200/100 and asked if therapist could check his BP now.     TREATMENT AREA =  Left shoulder pain [M25.512]     OBJECTIVE    Therapeutic Procedures:    Tx Min Billable or 1:1 Min (if diff from Tx Min) Procedure, Rationale, Specifics   11  57874 Therapeutic Exercise (timed):  increase ROM, strength, coordination, balance, and proprioception to improve patient's ability to progress to PLOF and address remaining functional goals. (see flow sheet as applicable)     Details if applicable:         [facility] Totals Reminder: bill using total billable min of TIMED therapeutic procedures (example: do not include dry needle or estim unattended, both untimed codes, in totals to left)  8-22 min = 1 unit; 23-37 min = 2 units; 38-52 min = 3 units; 53-67 min = 4 units; 68-82 min = 5 units   Total Total     [x]  Patient Education billed concurrently with other procedures   [x] Review HEP    [] Progressed/Changed HEP, detail:    [] Other detail:       Objective Information/Functional Measures/Assessment    BP was taken at beginning of visit 160/85 mmHg. Pt sat for 5 min, BP taken again: 160/90mmHg, with c/o forehead \"tingling\". Therapist told pt that with high BP we are unable to perform therex today and recommended that he be seen by MD, especially d/t elevated BP yesterday. Pt states he wanted to do exercises and shouldn't have told therapist that he was having problems with high BP. Therapist edu pt that it is unsafe to perform exercise when BP is elevated and offered to call someone to give him a ride to the

## 2024-05-16 ENCOUNTER — TELEPHONE (OUTPATIENT)
Age: 59
End: 2024-05-16

## 2024-05-16 ENCOUNTER — HOSPITAL ENCOUNTER (OUTPATIENT)
Facility: HOSPITAL | Age: 59
Setting detail: RECURRING SERIES
Discharge: HOME | End: 2024-05-19
Payer: COMMERCIAL

## 2024-05-16 DIAGNOSIS — M75.112 NONTRAUMATIC INCOMPLETE TEAR OF LEFT ROTATOR CUFF: Primary | ICD-10-CM

## 2024-05-16 PROCEDURE — 97530 THERAPEUTIC ACTIVITIES: CPT

## 2024-05-16 PROCEDURE — 97112 NEUROMUSCULAR REEDUCATION: CPT

## 2024-05-16 PROCEDURE — 97110 THERAPEUTIC EXERCISES: CPT

## 2024-05-16 RX ORDER — OXYCODONE HYDROCHLORIDE 5 MG/1
5 TABLET ORAL EVERY 6 HOURS PRN
Qty: 28 TABLET | Refills: 0 | Status: SHIPPED | OUTPATIENT
Start: 2024-05-16 | End: 2024-05-23

## 2024-05-16 NOTE — PROGRESS NOTES
self-care  Status at eval: AROM not tested  PN:  FIR AROM to left buttock.  5/8/2024.  Progressing.  4. Improve FOTO Score to >/= 64/100 to indicate improved function  Status at eval: 21/100  PN:  FOTO=53.  5/8/2024.  Progressing.    Next PN/ RC due 5/9/24  Auth due NA    PLAN  - Continue Plan of Care    MIHAELA SANTOS, PTA    5/16/2024    9:50 AM    Future Appointments   Date Time Provider Department Center   5/21/2024 10:30 AM Leobardo Olsen MD [facility]

## 2024-05-16 NOTE — TELEPHONE ENCOUNTER
Patient advised he went to Physical Therapy yesterday, however he wasn't able to have his therapy due to his Blood pressure was high. He will be going again today he's not sure if he will have therapy until they check his blood pressure

## 2024-05-20 ENCOUNTER — HOSPITAL ENCOUNTER (OUTPATIENT)
Facility: HOSPITAL | Age: 59
Setting detail: RECURRING SERIES
End: 2024-05-20
Payer: COMMERCIAL

## 2024-05-20 ENCOUNTER — TELEPHONE (OUTPATIENT)
Facility: HOSPITAL | Age: 59
End: 2024-05-20

## 2024-05-21 ENCOUNTER — OFFICE VISIT (OUTPATIENT)
Age: 59
End: 2024-05-21

## 2024-05-21 VITALS — BODY MASS INDEX: 33.4 KG/M2 | TEMPERATURE: 97 F | HEIGHT: 71 IN | WEIGHT: 238.6 LBS

## 2024-05-21 DIAGNOSIS — M75.22 TENDINITIS OF LONG HEAD OF BICEPS BRACHII OF LEFT SHOULDER: ICD-10-CM

## 2024-05-21 DIAGNOSIS — M75.112 NONTRAUMATIC INCOMPLETE TEAR OF LEFT ROTATOR CUFF: Primary | ICD-10-CM

## 2024-05-21 PROCEDURE — 99024 POSTOP FOLLOW-UP VISIT: CPT | Performed by: ORTHOPAEDIC SURGERY

## 2024-05-21 NOTE — PROGRESS NOTES
Not on file   Housing Stability: Not on file       REVIEW OF SYSTEMS:      No changes from previous review of systems unless noted.    PHYSICAL EXAMINATION:  Temp 97 °F (36.1 °C) (Temporal)   Ht 1.803 m (5' 11\")   Wt 108.2 kg (238 lb 9.6 oz)   BMI 33.28 kg/m²   Body mass index is 33.28 kg/m².  GENERAL: Alert and oriented x3, in no acute distress.  HEENT: Normocephalic, atraumatic.    Left shoulder range of motion is returning to normal    IMAGING:  Imaging read by myself and interpreted as follows:      ASSESSMENT & PLAN  Diagnosis: Status post left shoulder arthroscopic rotator cuff repair, biceps tenodesis    Kevin Upton will continue physical therapy.  I would like to see him back in 6 weeks.    Prescription medication management discussed. In the interim prior to the next visit should symptoms worsen I recommend Tylenol or OTC NSAIDs to be taken as needed as long as these medications are not medically contraindicated.     Plan was discussed in detail with patient, all questions were answered, and patient voiced understanding of plan.    Electronically signed by: Leobardo Olsen MD     Note: This note was completed using voice recognition software.  Any typographical/name errors or mistakes are unintentional.

## 2024-05-22 ENCOUNTER — HOSPITAL ENCOUNTER (OUTPATIENT)
Facility: HOSPITAL | Age: 59
Setting detail: RECURRING SERIES
Discharge: HOME | End: 2024-05-25
Payer: COMMERCIAL

## 2024-05-22 PROCEDURE — 97110 THERAPEUTIC EXERCISES: CPT

## 2024-05-22 PROCEDURE — 97530 THERAPEUTIC ACTIVITIES: CPT

## 2024-05-22 NOTE — PROGRESS NOTES
PHYSICAL / OCCUPATIONAL THERAPY - DAILY TREATMENT NOTE    Patient Name: Kevin Upton    Date: 2024    : 1965  Insurance: Payor: BCBS / Plan: BCBS OUT OF STATE / Product Type: *No Product type* /      Patient  verified Yes     Visit #   Current / Total 3 8   Time   In / Out 11:41 12:30   Pain   In / Out 0/10 0/10   Subjective Functional Status/Changes: Pt reports he saw Dr Olsen yesterday \"he said you're going to start giving me weights and stuff\". States he has been taking his BP medication.      TREATMENT AREA =  Left shoulder pain [M25.512]     OBJECTIVE    Modalities Rationale:     decrease inflammation and decrease pain to improve patient's ability to progress to PLOF and address remaining functional goals.     min [] Estim Unattended, type/location:                                      []  w/ice    []  w/heat    min [] Estim Attended, type/location:                                     []  w/US     []  w/ice    []  w/heat    []  TENS insruct      min []  Mechanical Traction: type/lbs                   []  pro   []  sup   []  int   []  cont    []  before manual    []  after manual    min []  Ultrasound, settings/location:     10 min  unbill [x]  Ice     []  Heat    location/position: To L shoulder in reclined long sit    min []  Paraffin,  details:     min []  Vasopneumatic Device, press/temp:     min []  Whirlpool / Fluido:    If using vaso (only need to measure limb vaso being performed on)      pre-treatment girth :       post-treatment girth :       measured at (landmark location) :      min []  Other:    Skin assessment post-treatment:   Intact      Therapeutic Procedures:    Tx Min Billable or 1:1 Min (if diff from Tx Min) Procedure, Rationale, Specifics   24 [phone] [id number] Therapeutic Exercise (timed):  increase ROM, strength, coordination, balance, and proprioception to improve patient's ability to progress to PLOF and address remaining functional goals. (see flow sheet as applicable)

## 2024-05-28 ENCOUNTER — TELEPHONE (OUTPATIENT)
Age: 59
End: 2024-05-28

## 2024-05-29 ENCOUNTER — HOSPITAL ENCOUNTER (OUTPATIENT)
Facility: HOSPITAL | Age: 59
Setting detail: RECURRING SERIES
Discharge: HOME | End: 2024-06-01
Payer: COMMERCIAL

## 2024-05-29 PROCEDURE — 97112 NEUROMUSCULAR REEDUCATION: CPT

## 2024-05-29 PROCEDURE — 97530 THERAPEUTIC ACTIVITIES: CPT

## 2024-05-29 PROCEDURE — 97110 THERAPEUTIC EXERCISES: CPT

## 2024-05-29 NOTE — PROGRESS NOTES
PHYSICAL / OCCUPATIONAL THERAPY - DAILY TREATMENT NOTE    Patient Name: Kevin Upton    Date: 2024    : 1965  Insurance: Payor: BCBS / Plan: BCBS OUT OF STATE / Product Type: *No Product type* /      Patient  verified Yes     Visit #   Current / Total 4 8   Time   In / Out 1:02 1:47   Pain   In / Out 3 0   Subjective Functional Status/Changes: Pt reports he has some soreness in the shoulder today.      TREATMENT AREA =  Left shoulder pain [M25.512]     OBJECTIVE  Therapeutic Procedures:    Tx Min Billable or 1:1 Min (if diff from Tx Min) Procedure, Rationale, Specifics   11  16564 Therapeutic Exercise (timed):  increase ROM, strength, coordination, balance, and proprioception to improve patient's ability to progress to PLOF and address remaining functional goals. (see flow sheet as applicable)     Details if applicable:       12  95958 Therapeutic Activity (timed):  use of dynamic activities replicating functional movements to increase ROM, strength, coordination, balance, and proprioception in order to improve patient's ability to progress to PLOF and address remaining functional goals.  (see flow sheet as applicable)     Details if applicable:  exercises, blood pressure measurements   22  06669 Neuromuscular Re-Education (timed):  improve balance, coordination, kinesthetic sense, posture, core stability and proprioception to improve patient's ability to develop conscious control of individual muscles and awareness of position of extremities in order to progress to PLOF and address remaining functional goals. (see flow sheet as applicable)     Details if applicable:      45  [facility] Totals Reminder: bill using total billable min of TIMED therapeutic procedures (example: do not include dry needle or estim unattended, both untimed codes, in totals to left)  8-22 min = 1 unit; 23-37 min = 2 units; 38-52 min = 3 units; 53-67 min = 4 units; 68-82 min = 5 units   Total Total     [x]  Patient Education

## 2024-05-31 ENCOUNTER — HOSPITAL ENCOUNTER (OUTPATIENT)
Facility: HOSPITAL | Age: 59
Setting detail: RECURRING SERIES
End: 2024-05-31
Payer: COMMERCIAL

## 2024-05-31 PROCEDURE — 97110 THERAPEUTIC EXERCISES: CPT

## 2024-05-31 PROCEDURE — 97530 THERAPEUTIC ACTIVITIES: CPT

## 2024-05-31 PROCEDURE — 97112 NEUROMUSCULAR REEDUCATION: CPT

## 2024-05-31 NOTE — TELEPHONE ENCOUNTER
Patient is requesting an update regarding his previous 5/28/24 phone message.    Patient tel [phone]

## 2024-05-31 NOTE — PROGRESS NOTES
PHYSICAL / OCCUPATIONAL THERAPY - DAILY TREATMENT NOTE    Patient Name: Kevin Upton    Date: 2024    : 1965  Insurance: Payor: BCBS / Plan: BCBS OUT OF STATE / Product Type: *No Product type* /      Patient  verified Yes     Visit #   Current / Total 5 8   Time   In / Out 940 1030   Pain   In / Out 0 0   Subjective Functional Status/Changes: Patient states that most of his pain is at night.      TREATMENT AREA =  Left shoulder pain [M25.512]     OBJECTIVE    Ice (UNBILLED):  location/position: sitting; left shoulder     Min Rationale   10 decrease pain to improve patient's ability to progress to PLOF and address remaining functional goals.     Skin assessment post-treatment:   Intact      Therapeutic Procedures:     18750 Therapeutic Exercise (timed):  increase ROM, strength, coordination, balance, and proprioception to improve patient's ability to progress to PLOF and address remaining functional goals.   Tx Min Billable or 1:1 Min   (if diff from Tx Min) Details:   15   See flow sheet as applicable      31342 Neuromuscular Re-Education (timed):  improve balance, coordination, kinesthetic sense, posture, core stability and proprioception to improve patient's ability to develop conscious control of individual muscles and awareness of position of extremities in order to progress to PLOF and address remaining functional goals.   Tx Min Billable or 1:1 Min   (if diff from Tx Min) Details:   15   See flow sheet as applicable      90420 Therapeutic Activity (timed):  use of dynamic activities replicating functional movements to increase ROM, strength, coordination, balance, and proprioception in order to improve patient's ability to progress to PLOF and address remaining functional goals.   Tx Min Billable or 1:1 Min   (if diff from Tx Min) Details:   10   See flow sheet as applicable         40   [facility] Totals Reminder: bill using total billable min of TIMED therapeutic procedures (example: do not

## 2024-06-04 ENCOUNTER — TELEPHONE (OUTPATIENT)
Facility: HOSPITAL | Age: 59
End: 2024-06-04

## 2024-06-04 ENCOUNTER — HOSPITAL ENCOUNTER (OUTPATIENT)
Facility: HOSPITAL | Age: 59
Setting detail: RECURRING SERIES
End: 2024-06-04
Payer: COMMERCIAL

## 2024-06-04 NOTE — TELEPHONE ENCOUNTER
6/4 r/s to 6/5. Patient requested to reschedule today's appt to [facility], states that he was not feeling well.

## 2024-06-05 ENCOUNTER — HOSPITAL ENCOUNTER (OUTPATIENT)
Facility: HOSPITAL | Age: 59
Setting detail: RECURRING SERIES
Discharge: HOME | End: 2024-06-08
Payer: COMMERCIAL

## 2024-06-05 PROCEDURE — 97112 NEUROMUSCULAR REEDUCATION: CPT

## 2024-06-05 PROCEDURE — 97110 THERAPEUTIC EXERCISES: CPT

## 2024-06-05 NOTE — PROGRESS NOTES
PHYSICAL / OCCUPATIONAL THERAPY - DAILY TREATMENT NOTE    Patient Name: Kevin Upton    Date: 2024    : 1965  Insurance: Payor: BCBS / Plan: BCBS OUT OF STATE / Product Type: *No Product type* /      Patient  verified Yes     Visit #   Current / Total 6 8   Time   In / Out 748 820   Pain   In / Out 0 0   Subjective Functional Status/Changes: Patient reports he feels he has a new shoulder and his shoulder is moving nicely.      TREATMENT AREA =  Left shoulder pain [M25.512]     OBJECTIVE      Therapeutic Procedures:    49228 Therapeutic Exercise (timed):  increase ROM, strength, coordination, balance, and proprioception to improve patient's ability to progress to PLOF and address remaining functional goals.   Tx Min Billable or 1:1 Min   (if diff from Tx Min) Details:   10  See flow sheet as applicable     69417 Neuromuscular Re-Education (timed):  improve balance, coordination, kinesthetic sense, posture, core stability and proprioception to improve patient's ability to develop conscious control of individual muscles and awareness of position of extremities in order to progress to PLOF and address remaining functional goals.   Tx Min Billable or 1:1 Min   (if diff from Tx Min) Details:   22  See flow sheet as applicable       32  [facility] Totals Reminder: bill using total billable min of TIMED therapeutic procedures (example: do not include dry needle or estim unattended, both untimed codes, in totals to left)  8-22 min = 1 unit; 23-37 min = 2 units; 38-52 min = 3 units; 53-67 min = 4 units; 68-82 min = 5 units   Total Total     [x]  Patient Education billed concurrently with other procedures   [x] Review HEP    [] Progressed/Changed HEP, detail:    [] Other detail:       Objective Information/Functional Measures/Assessment    Patient reports 85% improvement with overall symptoms of shoulder. Patient states he feels he continues to lack strength. Continue to progress exercises to focus on increasing

## 2024-06-06 ENCOUNTER — HOSPITAL ENCOUNTER (OUTPATIENT)
Facility: HOSPITAL | Age: 59
Setting detail: RECURRING SERIES
Discharge: HOME | End: 2024-06-09
Payer: COMMERCIAL

## 2024-06-06 PROCEDURE — 97112 NEUROMUSCULAR REEDUCATION: CPT | Performed by: PHYSICAL THERAPIST

## 2024-06-06 PROCEDURE — 97110 THERAPEUTIC EXERCISES: CPT | Performed by: PHYSICAL THERAPIST

## 2024-06-06 PROCEDURE — 97530 THERAPEUTIC ACTIVITIES: CPT | Performed by: PHYSICAL THERAPIST

## 2024-06-06 NOTE — PROGRESS NOTES
[facility]  [address] [address] Ph: [phone] Fx: [phone]  PHYSICAL THERAPY PROGRESS NOTE  Patient Name: Kevin Upton : 1965   Treatment/Medical Diagnosis: Left shoulder pain [M25.512]   Referral Source: Leobardo Olsen MD     Date of Initial Visit: 4/10/2024 Attended Visits: 14 Missed Visits: 2     SUMMARY OF TREATMENT  Patient reports he is not really limited. He can cut grass, reach up, do things around his home, sweep. He notes that his strength is not 100% at this time. Notes only discomfort is at night. No pain during the day.     CURRENT STATUS  1. Pt will demo L shoulder PROM WNL all planes pain-free to prevent adhesions  Status at [facility]: PROM flex 140, abd 122, ER 22 p!  PN: L flexion: 180 deg, L abd: 150 deg, L ER: 75 deg, IR 55.  24.  Goal Met.    2.Pt to demo L shoulder AROM flex, abd to 120 deg to improve ability to reach overhead  PN:  Patient still working in [employer].  Will progress to AROM per protocol.  2024.  No change.  Current: AROM shoulder flexion 0-150 deg, scaption 0-147 deg. 24  MET    3. Improve L shoulder FIR AROM to at least L2 to improve ease of self-care  Status at [facility]: AROM not tested  PN:  FIR AROM to left buttock.  2024.  Progressing.  Current: FIR AROM to L2.  2024.    Goal met     4. Improve FOTO Score to >/= 64/100 to indicate improved function  Status at [facility]:   PN:  FOTO=53.  2024.  Progressing  Current: FOTO = 71.  2024.    Goal Met.      Payor: BCBS / Plan: BCBS OUT OF STATE / Product Type: *No Product type* /     Non-Medicare, can change goals, can adjust or add frequency duration, no signature required    Updated goals to be met in 4 weeks:   Patient will demonstrate MMT score of 3/5 for his left shoulder motions.  PN:  Not tested at this time.  2.  Patient will be able to lift a 5# weight overhead in order to improve his functional activity level.  PN:

## 2024-06-06 NOTE — PROGRESS NOTES
PHYSICAL / OCCUPATIONAL THERAPY - DAILY TREATMENT NOTE    Patient Name: Kevin Upton    Date: 2024    : 1965  Insurance: Payor: BCBS / Plan: BCBS OUT OF STATE / Product Type: *No Product type* /      Patient  verified Yes     Visit #   Current / Total 7 8   Time   In / Out 9:40 10:30   Pain   In / Out 0 0   Subjective Functional Status/Changes: Patient reports he is not really limited.  He can cut grass, reach up, do things around his home, sweep.  He notes that his strength is not 100% at this time.  Notes only discomfort is at night.  No pain during the day.     TREATMENT AREA =  Left shoulder pain [M25.512]     OBJECTIVE    Ice (UNBILLED):  location/position: left shoulder/sitting      Min Rationale   10 decrease inflammation and decrease pain to improve patient's ability to progress to PLOF and address remaining functional goals.     Skin assessment post-treatment:   Intact     Therapeutic Procedures:    16417 Therapeutic Exercise (timed):  increase ROM, strength, coordination, balance, and proprioception to improve patient's ability to progress to PLOF and address remaining functional goals.   Tx Min Billable or 1:1 Min   (if diff from Tx Min) Details:   20  See flow sheet as applicable     39009 Neuromuscular Re-Education (timed):  improve balance, coordination, kinesthetic sense, posture, core stability and proprioception to improve patient's ability to develop conscious control of individual muscles and awareness of position of extremities in order to progress to PLOF and address remaining functional goals.   Tx Min Billable or 1:1 Min   (if diff from Tx Min) Details:   10  See flow sheet as applicable     80909 Therapeutic Activity (timed):  use of dynamic activities replicating functional movements to increase ROM, strength, coordination, balance, and proprioception in order to improve patient's ability to progress to PLOF and address remaining functional goals.  (see flow sheet as

## 2024-06-11 ENCOUNTER — TELEPHONE (OUTPATIENT)
Facility: HOSPITAL | Age: 59
End: 2024-06-11

## 2024-06-11 ENCOUNTER — APPOINTMENT (OUTPATIENT)
Facility: HOSPITAL | Age: 59
End: 2024-06-11
Payer: COMMERCIAL

## 2024-06-11 NOTE — TELEPHONE ENCOUNTER
CXL <24hrs. Patient requested to cancel today's appt. States that he has an appointment and is unable to make his appt with us. Next appt confirmed.

## 2024-06-13 ENCOUNTER — HOSPITAL ENCOUNTER (OUTPATIENT)
Facility: HOSPITAL | Age: 59
Setting detail: RECURRING SERIES
Discharge: HOME | End: 2024-06-16
Payer: COMMERCIAL

## 2024-06-13 PROCEDURE — 97110 THERAPEUTIC EXERCISES: CPT

## 2024-06-13 PROCEDURE — 97112 NEUROMUSCULAR REEDUCATION: CPT

## 2024-06-13 NOTE — PROGRESS NOTES
PHYSICAL / OCCUPATIONAL THERAPY - DAILY TREATMENT NOTE    Patient Name: Kevin Upton    Date: 2024    : 1965  Insurance: Payor: BCBS / Plan: BCBS OUT OF STATE / Product Type: *No Product type* /      Patient  verified Yes     Visit #   Current / Total 1 8   Time   In / Out 948 1034   Pain   In / Out 0 0   Subjective Functional Status/Changes: Patient reports he has pain is at night.      TREATMENT AREA =  Left shoulder pain [M25.512]     OBJECTIVE    Ice (UNBILLED):  location/position: sitting; left shoulder     Min Rationale   10 decrease pain to improve patient's ability to progress to PLOF and address remaining functional goals.     Skin assessment post-treatment:   Intact     Therapeutic Procedures:    64886 Therapeutic Exercise (timed):  increase ROM, strength, coordination, balance, and proprioception to improve patient's ability to progress to PLOF and address remaining functional goals.   Tx Min Billable or 1:1 Min   (if diff from Tx Min) Details:   14   See flow sheet as applicable      60604 Neuromuscular Re-Education (timed):  improve balance, coordination, kinesthetic sense, posture, core stability and proprioception to improve patient's ability to develop conscious control of individual muscles and awareness of position of extremities in order to progress to PLOF and address remaining functional goals.   Tx Min Billable or 1:1 Min   (if diff from Tx Min) Details:   22   See flow sheet as applicable         36   [facility] Totals Reminder: bill using total billable min of TIMED therapeutic procedures (example: do not include dry needle or estim unattended, both untimed codes, in totals to left)  8-22 min = 1 unit; 23-37 min = 2 units; 38-52 min = 3 units; 53-67 min = 4 units; 68-82 min = 5 units   Total Total         [x]  Patient Education billed concurrently with other procedures   [x] Review HEP    [] Progressed/Changed HEP, detail:    [] Other detail:       Objective

## 2024-06-18 ENCOUNTER — TELEPHONE (OUTPATIENT)
Facility: HOSPITAL | Age: 59
End: 2024-06-18

## 2024-06-18 ENCOUNTER — APPOINTMENT (OUTPATIENT)
Facility: HOSPITAL | Age: 59
End: 2024-06-18
Payer: COMMERCIAL

## 2024-06-19 ENCOUNTER — TELEPHONE (OUTPATIENT)
Facility: HOSPITAL | Age: 59
End: 2024-06-19

## 2024-06-19 ENCOUNTER — HOSPITAL ENCOUNTER (OUTPATIENT)
Facility: HOSPITAL | Age: 59
Setting detail: RECURRING SERIES
Discharge: HOME | End: 2024-06-22
Payer: COMMERCIAL

## 2024-06-19 PROCEDURE — 97530 THERAPEUTIC ACTIVITIES: CPT

## 2024-06-19 PROCEDURE — 97110 THERAPEUTIC EXERCISES: CPT

## 2024-06-19 PROCEDURE — 97112 NEUROMUSCULAR REEDUCATION: CPT

## 2024-06-19 NOTE — PROGRESS NOTES
PHYSICAL / OCCUPATIONAL THERAPY - DAILY TREATMENT NOTE    Patient Name: Kevin Upton    Date: 2024    : 1965  Insurance: Payor: BCBS / Plan: BCBS OUT OF STATE / Product Type: *No Product type* /      Patient  verified Yes     Visit #   Current / Total 2 8   Time   In / Out 745 836   Pain   In / Out 3 0   Subjective Functional Status/Changes: Patient states he has difficulty sleeping at night due to pain.      TREATMENT AREA =  Left shoulder pain [M25.512]     OBJECTIVE    Ice (UNBILLED):  location/position: sitting; left shoulder     Min Rationale   10 decrease pain to improve patient's ability to progress to PLOF and address remaining functional goals.     Skin assessment post-treatment:   Intact      Therapeutic Procedures:     89536 Therapeutic Exercise (timed):  increase ROM, strength, coordination, balance, and proprioception to improve patient's ability to progress to PLOF and address remaining functional goals.   Tx Min Billable or 1:1 Min   (if diff from Tx Min) Details:   15   See flow sheet as applicable      86564 Neuromuscular Re-Education (timed):  improve balance, coordination, kinesthetic sense, posture, core stability and proprioception to improve patient's ability to develop conscious control of individual muscles and awareness of position of extremities in order to progress to PLOF and address remaining functional goals.   Tx Min Billable or 1:1 Min   (if diff from Tx Min) Details:   15   See flow sheet as applicable      85069 Therapeutic Activity (timed):  use of dynamic activities replicating functional movements to increase ROM, strength, coordination, balance, and proprioception in order to improve patient's ability to progress to PLOF and address remaining functional goals.   Tx Min Billable or 1:1 Min   (if diff from Tx Min) Details:   11   See flow sheet as applicable         41   [facility] Totals Reminder: bill using total billable min of TIMED therapeutic procedures

## 2024-06-20 ENCOUNTER — APPOINTMENT (OUTPATIENT)
Facility: HOSPITAL | Age: 59
End: 2024-06-20
Payer: COMMERCIAL

## 2024-06-21 ENCOUNTER — APPOINTMENT (OUTPATIENT)
Facility: HOSPITAL | Age: 59
End: 2024-06-21
Payer: COMMERCIAL

## 2024-06-21 ENCOUNTER — TELEPHONE (OUTPATIENT)
Facility: HOSPITAL | Age: 59
End: 2024-06-21

## 2024-06-25 ENCOUNTER — TELEPHONE (OUTPATIENT)
Facility: HOSPITAL | Age: 59
End: 2024-06-25

## 2024-06-25 ENCOUNTER — APPOINTMENT (OUTPATIENT)
Facility: HOSPITAL | Age: 59
End: 2024-06-25
Payer: COMMERCIAL

## 2024-06-25 NOTE — TELEPHONE ENCOUNTER
Patient returned my call in regards to today's cancellation. Patient has been informed of his attendance. He states that he is in pain and cannot make today's appt. He has been informed of DC if he does not show to Thursday's appt.

## 2024-06-25 NOTE — TELEPHONE ENCOUNTER
CXL <24hrs. Patient lvm requesting to cancel today's appt. Tried calling back to discuss our attendance policy. No answer, LVM.

## 2024-06-27 ENCOUNTER — HOSPITAL ENCOUNTER (OUTPATIENT)
Facility: HOSPITAL | Age: 59
Setting detail: RECURRING SERIES
Discharge: HOME | End: 2024-06-30
Payer: COMMERCIAL

## 2024-06-27 PROCEDURE — 97112 NEUROMUSCULAR REEDUCATION: CPT

## 2024-06-27 PROCEDURE — 97110 THERAPEUTIC EXERCISES: CPT

## 2024-06-27 PROCEDURE — 97530 THERAPEUTIC ACTIVITIES: CPT

## 2024-07-02 ENCOUNTER — HOSPITAL ENCOUNTER (OUTPATIENT)
Facility: HOSPITAL | Age: 59
Setting detail: RECURRING SERIES
Discharge: HOME | End: 2024-07-05
Payer: COMMERCIAL

## 2024-07-02 PROCEDURE — 97112 NEUROMUSCULAR REEDUCATION: CPT

## 2024-07-02 PROCEDURE — 97530 THERAPEUTIC ACTIVITIES: CPT

## 2024-07-02 PROCEDURE — 97110 THERAPEUTIC EXERCISES: CPT

## 2024-07-02 NOTE — PROGRESS NOTES
PHYSICAL / OCCUPATIONAL THERAPY - DAILY TREATMENT NOTE    Patient Name: Kevin Upton    Date: 2024    : 1965  Insurance: Payor: BCBS / Plan: BCBS OUT OF STATE / Product Type: *No Product type* /      Patient  verified Yes     Visit #   Current / Total 4 8   Time   In / Out 942 1033   Pain   In / Out 2 2   Subjective Functional Status/Changes: Patient states he has no problems with his shoulder during the day, but is having difficulty sleeping at night.      TREATMENT AREA =  Left shoulder pain [M25.512]     OBJECTIVE    Ice (UNBILLED):  location/position: sitting; left shoulder     Min Rationale   10 decrease pain to improve patient's ability to progress to PLOF and address remaining functional goals.     Skin assessment post-treatment:   Intact      Therapeutic Procedures:     73426 Therapeutic Exercise (timed):  increase ROM, strength, coordination, balance, and proprioception to improve patient's ability to progress to PLOF and address remaining functional goals.   Tx Min Billable or 1:1 Min   (if diff from Tx Min) Details:   15   See flow sheet as applicable      07594 Neuromuscular Re-Education (timed):  improve balance, coordination, kinesthetic sense, posture, core stability and proprioception to improve patient's ability to develop conscious control of individual muscles and awareness of position of extremities in order to progress to PLOF and address remaining functional goals.   Tx Min Billable or 1:1 Min   (if diff from Tx Min) Details:   15   See flow sheet as applicable      14866 Therapeutic Activity (timed):  use of dynamic activities replicating functional movements to increase ROM, strength, coordination, balance, and proprioception in order to improve patient's ability to progress to PLOF and address remaining functional goals.   Tx Min Billable or 1:1 Min   (if diff from Tx Min) Details:   11   See flow sheet as applicable         41    [facility] Totals Reminder: bill using total

## 2024-07-10 ENCOUNTER — OFFICE VISIT (OUTPATIENT)
Age: 59
End: 2024-07-10
Payer: COMMERCIAL

## 2024-07-10 VITALS — HEIGHT: 71 IN | RESPIRATION RATE: 16 BRPM | BODY MASS INDEX: 33.28 KG/M2

## 2024-07-10 DIAGNOSIS — M75.22 TENDINITIS OF LONG HEAD OF BICEPS BRACHII OF LEFT SHOULDER: ICD-10-CM

## 2024-07-10 DIAGNOSIS — M75.112 NONTRAUMATIC INCOMPLETE TEAR OF LEFT ROTATOR CUFF: Primary | ICD-10-CM

## 2024-07-10 PROCEDURE — 99213 OFFICE O/P EST LOW 20 MIN: CPT | Performed by: ORTHOPAEDIC SURGERY

## 2024-07-10 NOTE — PROGRESS NOTES
Allergies   Allergen Reactions    Latex Hives and Rash     Other reaction(s): Unknown (comments)    Gabapentin (Once-Daily) Other (See Comments)     SLEEP    Tramadol Nausea Only       Past Surgical History:   Procedure Laterality Date    ANESTH,SURGERY OF SHOULDER Right     CERVICAL FUSION N/A 7/31/2023    ANTERIOR CERVICAL DECOMPRESSION FUSION CERVICAL FIVE/SIX, CERVICAL SIX/SEVEN performed by Neo Duarte MD at [facility] MAIN OR    KNEE ARTHROSCOPY Right     LUMBAR DISCECTOMY      x 2    ORTHOPEDIC SURGERY      elbow ligament repair    ROTATOR CUFF REPAIR Right        Social History     Socioeconomic History    Marital status:      Spouse name: Not on file    Number of children: Not on file    Years of education: Not on file    Highest education level: Not on file   Occupational History    Not on file   Tobacco Use    Smoking status: Never    Smokeless tobacco: Never   Vaping Use    Vaping Use: Never used   Substance and Sexual Activity    Alcohol use: Not Currently    Drug use: Not Currently     Types: Cocaine, Marijuana (Weed)    Sexual activity: Not on file   Other Topics Concern    Not on file   Social History Narrative    Not on file     Social Determinants of Health     Financial Resource Strain: Not on file   Food Insecurity: Not on file   Transportation Needs: No Transportation Needs (8/11/2023)    OASIS : Transportation     Lack of Transportation (Medical): No     Lack of Transportation (Non-Medical): No     Patient Unable or Declines to Respond: No   Physical Activity: Not on file   Stress: Not on file   Social Connections: Feeling Socially Integrated (8/11/2023)    OASIS : Social Isolation     Frequency of experiencing loneliness or isolation: Never   Intimate Partner Violence: Not on file   Housing Stability: Not on file       REVIEW OF SYSTEMS:      No changes from previous review of systems unless noted.    PHYSICAL EXAMINATION:  Resp 16   Ht 1.803 m (5' 11\")   BMI 33.28 kg/m²

## 2024-07-11 NOTE — TELEPHONE ENCOUNTER
Per Dr Olsen      Too far out from surgery at this time    Rec NSAIDs/Tylenol      Spoke with the Pharmacy to check on the Pt Rx vitamin D.    Pharmacy stated the directions of Take 5 capsules by mouth 1 (One) Time Per Week for 5 doses.    Can the Provider clarify if that is right or if it should say something different.

## 2024-08-14 ENCOUNTER — OFFICE VISIT (OUTPATIENT)
Age: 59
End: 2024-08-14
Payer: COMMERCIAL

## 2024-08-14 VITALS — HEIGHT: 71 IN | BODY MASS INDEX: 33.28 KG/M2

## 2024-08-14 DIAGNOSIS — M75.112 NONTRAUMATIC INCOMPLETE TEAR OF LEFT ROTATOR CUFF: Primary | ICD-10-CM

## 2024-08-14 PROCEDURE — 20610 DRAIN/INJ JOINT/BURSA W/O US: CPT | Performed by: ORTHOPAEDIC SURGERY

## 2024-08-14 PROCEDURE — 99214 OFFICE O/P EST MOD 30 MIN: CPT | Performed by: ORTHOPAEDIC SURGERY

## 2024-08-14 RX ORDER — LIDOCAINE HYDROCHLORIDE 10 MG/ML
3 INJECTION, SOLUTION INFILTRATION; PERINEURAL ONCE
Status: COMPLETED | OUTPATIENT
Start: 2024-08-14 | End: 2024-08-14

## 2024-08-14 RX ORDER — TRIAMCINOLONE ACETONIDE 40 MG/ML
40 INJECTION, SUSPENSION INTRA-ARTICULAR; INTRAMUSCULAR ONCE
Status: COMPLETED | OUTPATIENT
Start: 2024-08-14 | End: 2024-08-14

## 2024-08-14 RX ADMIN — LIDOCAINE HYDROCHLORIDE 3 ML: 10 INJECTION, SOLUTION INFILTRATION; PERINEURAL at 16:16

## 2024-08-14 RX ADMIN — TRIAMCINOLONE ACETONIDE 40 MG: 40 INJECTION, SUSPENSION INTRA-ARTICULAR; INTRAMUSCULAR at 16:17

## 2024-08-14 NOTE — PROGRESS NOTES
[facility] AMBULATORY OFFICE NOTE    Patient: Kevin Upton                MRN: [id number]       SSN: [id number]  YOB: 1965        AGE: 59 y.o.        SEX: male  Body mass index is 33.28 kg/m².    PCP: Regan Bonilla PA  08/14/24    Chief Complaint: Left shoulder pain    HPI: Kevin Upton is a 59 y.o. male patient who returns today for his left shoulder.  He is now a little over 4 months out from his left shoulder small rotator cuff repair.  His range of motion is good.  He is reporting pain in the shoulder especially at night.    Past Medical History:   Diagnosis Date    Arthritis     Chronic pain     knees, back, hands    Degenerative arthritis of right knee     Hypertension     Sleep apnea     no cpap       No family history on file.    Current Outpatient Medications   Medication Sig Dispense Refill    naloxone 4 MG/0.1ML LIQD nasal spray  (Patient not taking: Reported on 8/1/2023)      vitamin D (ERGOCALCIFEROL) 1.25 MG (60746 UT) CAPS capsule TAKE 1 CAPSULE BY MOUTH WEEKLY (Patient not taking: Reported on 12/5/2023)      topiramate (TOPAMAX) 25 MG tablet Take 1 in the evening for 1 week, then increase to 2 the second week and continue with 3 in the evening (Patient not taking: Reported on 12/5/2023) 90 tablet 1    gabapentin (NEURONTIN) 300 MG capsule 1 in the morning and 2 in the evening as directed (Patient not taking: Reported on 6/8/2023) 90 capsule 1    diclofenac (VOLTAREN) 75 MG EC tablet Take 1 tablet by mouth 2 times daily (with meals) Take as needed for pain (Patient not taking: Reported on 12/5/2023) 60 tablet 1    amLODIPine (NORVASC) 10 MG tablet Take 10 mg by mouth daily (Patient not taking: Reported on 12/5/2023)      gabapentin (NEURONTIN) 300 MG capsule Take by mouth as needed. (Patient not taking: Reported on 4/24/2023)      telmisartan (MICARDIS) 20 MG tablet Take 20 mg by mouth daily (Patient not taking: Reported on 4/24/2023)

## 2025-01-31 ENCOUNTER — OFFICE VISIT (OUTPATIENT)
Age: 60
End: 2025-01-31

## 2025-01-31 VITALS — HEIGHT: 71 IN | RESPIRATION RATE: 16 BRPM | BODY MASS INDEX: 33.28 KG/M2

## 2025-01-31 DIAGNOSIS — M75.112 NONTRAUMATIC INCOMPLETE TEAR OF LEFT ROTATOR CUFF: Primary | ICD-10-CM

## 2025-01-31 RX ORDER — TRIAMCINOLONE ACETONIDE 40 MG/ML
40 INJECTION, SUSPENSION INTRA-ARTICULAR; INTRAMUSCULAR ONCE
Status: COMPLETED | OUTPATIENT
Start: 2025-01-31 | End: 2025-01-31

## 2025-01-31 RX ADMIN — TRIAMCINOLONE ACETONIDE 40 MG: 40 INJECTION, SUSPENSION INTRA-ARTICULAR; INTRAMUSCULAR at 09:33

## 2025-01-31 NOTE — PROGRESS NOTES
[facility] AMBULATORY OFFICE NOTE    Patient: Kevin Upton                MRN: [id number]       SSN: [id number]  YOB: 1965        AGE: 59 y.o.        SEX: male  Body mass index is 33.28 kg/m².    PCP: Regan Bonilla PA  01/31/25    Chief Complaint: Left shoulder follow up    HPI: Kevin Upton is a 59 y.o. male patient who turns today for his left shoulder.  At his last visit he got an injection which did help.  The pain is started to return.  Is also complaining of some numbness and tingling in the left hand especially at night.    Past Medical History:   Diagnosis Date    Arthritis     Chronic pain     knees, back, hands    Degenerative arthritis of right knee     Hypertension     Sleep apnea     no cpap       No family history on file.    Current Outpatient Medications   Medication Sig Dispense Refill    diclofenac sodium (VOLTAREN) 1 % GEL Apply 2 g topically 4 times daily 100 g 2    diclofenac sodium (VOLTAREN) 1 % GEL Apply 2 g topically 4 times daily 100 g 2    naloxone 4 MG/0.1ML LIQD nasal spray  (Patient not taking: Reported on 8/1/2023)      vitamin D (ERGOCALCIFEROL) 1.25 MG (74993 UT) CAPS capsule TAKE 1 CAPSULE BY MOUTH WEEKLY (Patient not taking: Reported on 12/5/2023)      topiramate (TOPAMAX) 25 MG tablet Take 1 in the evening for 1 week, then increase to 2 the second week and continue with 3 in the evening (Patient not taking: Reported on 12/5/2023) 90 tablet 1    gabapentin (NEURONTIN) 300 MG capsule 1 in the morning and 2 in the evening as directed (Patient not taking: Reported on 6/8/2023) 90 capsule 1    diclofenac (VOLTAREN) 75 MG EC tablet Take 1 tablet by mouth 2 times daily (with meals) Take as needed for pain (Patient not taking: Reported on 12/5/2023) 60 tablet 1    amLODIPine (NORVASC) 10 MG tablet Take 10 mg by mouth daily (Patient not taking: Reported on 12/5/2023)      gabapentin (NEURONTIN) 300 MG capsule Take by mouth as

## 2025-06-25 NOTE — PROGRESS NOTES
Kevin Upton  1965   Chief Complaint   Patient presents with    Shoulder Pain     Lt         HISTORY OF PRESENT ILLNESS  Kevin Upton is a 60 y.o. male who presents today for evaluation of left shoulder pain.  Pain is a 8/10. Pain has been present for months. Pt received a left shoulder injection by  on 1/31/2025.  Patient had improvement for about 5 months now having difficulty with reaching and overhead activities    Has tried following treatments: Injections:Yes; Brace:No; Therapy:No; Cane/Crutch:No      Allergies   Allergen Reactions    Latex Hives and Rash     Other reaction(s): Unknown (comments)    Gabapentin (Once-Daily) Other (See Comments)     SLEEP    Tramadol Nausea Only        Past Medical History:   Diagnosis Date    Arthritis     Chronic pain     knees, back, hands    Degenerative arthritis of right knee     Hypertension     Sleep apnea     no cpap      Social History       Tobacco History       Smoking Status  Never      Smokeless Tobacco Use  Never              Alcohol History       Alcohol Use Status  Not Currently              Drug Use       Drug Use Status  Not Currently Types  Cocaine, Marijuana (Weed)              Sexual Activity       Sexually Active  Not Asked                   Past Surgical History:   Procedure Laterality Date    ANESTH,SURGERY OF SHOULDER Right     CERVICAL FUSION N/A 7/31/2023    ANTERIOR CERVICAL DECOMPRESSION FUSION CERVICAL FIVE/SIX, CERVICAL SIX/SEVEN performed by Neo Duarte MD at [facility] MAIN OR    KNEE ARTHROSCOPY Right     LUMBAR DISCECTOMY      x 2    ORTHOPEDIC SURGERY      elbow ligament repair    ROTATOR CUFF REPAIR Right       History reviewed. No pertinent family history.  Current Outpatient Medications   Medication Sig    diclofenac sodium (VOLTAREN) 1 % GEL Apply 2 g topically 4 times daily    diclofenac sodium (VOLTAREN) 1 % GEL Apply 2 g topically 4 times daily    naloxone 4 MG/0.1ML LIQD nasal spray  (Patient not

## 2025-06-26 ENCOUNTER — OFFICE VISIT (OUTPATIENT)
Age: 60
End: 2025-06-26
Payer: COMMERCIAL

## 2025-06-26 VITALS — WEIGHT: 236 LBS | HEIGHT: 71 IN | BODY MASS INDEX: 33.04 KG/M2

## 2025-06-26 DIAGNOSIS — M75.102 ROTATOR CUFF SYNDROME, LEFT: Primary | ICD-10-CM

## 2025-06-26 PROCEDURE — 99214 OFFICE O/P EST MOD 30 MIN: CPT | Performed by: ORTHOPAEDIC SURGERY

## 2025-06-26 PROCEDURE — 20611 DRAIN/INJ JOINT/BURSA W/US: CPT | Performed by: ORTHOPAEDIC SURGERY

## 2025-06-26 RX ORDER — TRIAMCINOLONE ACETONIDE 40 MG/ML
40 INJECTION, SUSPENSION INTRA-ARTICULAR; INTRAMUSCULAR ONCE
Status: COMPLETED | OUTPATIENT
Start: 2025-06-26 | End: 2025-06-26

## 2025-06-26 RX ADMIN — TRIAMCINOLONE ACETONIDE 40 MG: 40 INJECTION, SUSPENSION INTRA-ARTICULAR; INTRAMUSCULAR at 11:27

## (undated) DEVICE — SYSTEM SKIN CLSR 60CM 2-OCTYL CYNOACRLT W/ MESH DISPNS

## (undated) DEVICE — SPONGE LAP 18X18IN STRL -- 5/PK

## (undated) DEVICE — INFLOW CASSETTE TUBING, DO NOT USE IF PACKAGE IS DAMAGED: Brand: CROSSFLOW

## (undated) DEVICE — ZIMMER® STERILE DISPOSABLE TOURNIQUET CUFF WITH PLC, DUAL PORT, SINGLE BLADDER, 34 IN. (86 CM)

## (undated) DEVICE — 3.0MM PRECISION NEURO (MATCH HEAD)

## (undated) DEVICE — APPLICATOR MEDICATED 10.5 CC SOLUTION CLR STRL CHLORAPREP

## (undated) DEVICE — NEEDLE SPNL L3.5IN PNK HUB S STL REG WALL FIT STYL W/ QNCKE

## (undated) DEVICE — Device

## (undated) DEVICE — BOTTLE  SPRAY  HYDROGEN PEROXIDE  EMPTY

## (undated) DEVICE — GLOVE ORTHO 7 1/2   MSG9475

## (undated) DEVICE — Z CONVERTED USE 2274617 CRUTCH ALM ADLT 5FT 10IN-6FT6IN PRM P

## (undated) DEVICE — SHOULDER STABILIZATION KIT,                                    DISPOSABLE 12 PER BOX

## (undated) DEVICE — PREP SKN CHLRAPRP APL 26ML STR --

## (undated) DEVICE — APPLIER CLP L9.38IN M LIG TI DISP STR RNG HNDL LIGACLP

## (undated) DEVICE — SSC BONE WAX: Brand: SSC BONE WAX

## (undated) DEVICE — BLANKET WRM AD W50XL85.8IN PACU FULL BODY FORC AIR

## (undated) DEVICE — 4-PORT MANIFOLD: Brand: NEPTUNE 2

## (undated) DEVICE — BLADE SHV CUT MENIS AGG + 4MM --

## (undated) DEVICE — 3M™ STERI-DRAPE™ U-DRAPE 1015: Brand: STERI-DRAPE™

## (undated) DEVICE — DRAPE,REIN 53X77,STERILE: Brand: MEDLINE

## (undated) DEVICE — WATERPROOF, BACTERIA PROOF DRESSING WITH ABSORBENT SEE THROUGH PAD: Brand: OPSITE POST-OP VISIBLE 10X8CM CTN 20

## (undated) DEVICE — INTENDED FOR TISSUE SEPARATION, AND OTHER PROCEDURES THAT REQUIRE A SHARP SURGICAL BLADE TO PUNCTURE OR CUT.: Brand: BARD-PARKER SAFETY BLADES SIZE 10, STERILE

## (undated) DEVICE — SUTURE VCRL SZ 3-0 L27IN ABSRB UD L26MM SH 1/2 CIR J416H

## (undated) DEVICE — DRAIN SURG W7MMXL20CM SIL FULL PERF HUBLESS FLAT RADPQ STRP

## (undated) DEVICE — Z DISCONTNUED USE 2132719 NEEDLE FLTR 19GA L1.5IN WALL THK5UM BRN POLYPR HUB S STL

## (undated) DEVICE — BANDAGE,GAUZE,BULKEE II,4.5"X4.1YD,STRL: Brand: MEDLINE

## (undated) DEVICE — GLOVE ORTHO 8   MSG9480

## (undated) DEVICE — BLADE SHV 4.0MM TOMCAT --

## (undated) DEVICE — ELECTRODE PT RET AD L9FT HI MOIST COND ADH HYDRGEL CORDED

## (undated) DEVICE — SCREW EXT FIX L14MM FOR DISTRCTN

## (undated) DEVICE — SUTURE ETHLN SZ 2-0 L18IN NONABSORBABLE BLK L19MM PS-2 PRIM 593H

## (undated) DEVICE — SPONGE SURG SM 3/8IN WHT PNUT DISECT RADPQ ST

## (undated) DEVICE — SYRINGE MED 3ML NDL 22GA L1 1/2IN REG BVL SFGLDE

## (undated) DEVICE — GARMENT,MEDLINE,DVT,SEQUENTIAL,CALF,MD: Brand: MEDLINE

## (undated) DEVICE — KIT CLN UP BON SECOURS MARYV

## (undated) DEVICE — SOLUTION IRRIG 3000ML 0.9% SOD CHL FLX CONT 0797208] ICU MEDICAL INC]

## (undated) DEVICE — DRAPE,TOP,102X53,STERILE: Brand: MEDLINE

## (undated) DEVICE — 3M™ TEGADERM™ TRANSPARENT FILM DRESSING FRAME STYLE, 1626W, 4 IN X 4-3/4 IN (10 CM X 12 CM), 50/CT 4CT/CASE: Brand: 3M™ TEGADERM™

## (undated) DEVICE — PASSER SUT SELF CAPTURE ROT CUF REP REUSE COBRA

## (undated) DEVICE — COLLAR CERV L H3.25X23IN M DENS FOAM COT STOCK CVR LO

## (undated) DEVICE — BLANKET WRM W40.2XL55.9IN IORT LO BODY + MISTRAL AIR

## (undated) DEVICE — 90-S MAX, SUCTION PROBE, NON-BENDABLE, MAX CUT LEVEL 11: Brand: SERFAS ENERGY

## (undated) DEVICE — STERILE POLYISOPRENE POWDER-FREE SURGICAL GLOVES: Brand: PROTEXIS

## (undated) DEVICE — CANNULA ARTHSCP L4CM DIA8MM PASSPRT BTTN

## (undated) DEVICE — PIN SAFT L L2IN S STL FOR GRP HLD

## (undated) DEVICE — BUR SHV CUT HLLW 6 FLUT 5.5MM --

## (undated) DEVICE — SOFT SILICONE HYDROCELLULAR SACRUM DRESSING WITH LOCK AWAY LAYER: Brand: ALLEVYN LIFE SACRUM (LARGE) PACK OF 10

## (undated) DEVICE — KNEE ARTHROSCOPY III-LF: Brand: MEDLINE INDUSTRIES, INC.

## (undated) DEVICE — BLADE ES ELASTOMERIC COAT INSUL DURABLE BEND UPTO 90DEG

## (undated) DEVICE — DISPOSABLE MULTI BAG ADAPTERS Y                                    TUBING, STERILE, 2 TO A SET 6 SETS                                    PER BOX

## (undated) DEVICE — DRESSING FOAM DISK DIA1IN H 7MM HYDRPHLC CHG IMPREG IN SL

## (undated) DEVICE — NEEDLE NRV BLK 22GA L2IN 30DEG INSUL BVL EXTN SET STIMUPLEX

## (undated) DEVICE — CANNULA THREADED FLEX 8.0 X 72MM: Brand: CLEAR-TRAC

## (undated) DEVICE — 1010 S-DRAPE TOWEL DRAPE 10/BX: Brand: STERI-DRAPE™

## (undated) DEVICE — DRSG GZ OIL EMUL CURAD 3X3 --

## (undated) DEVICE — SUT ETHLN 3-0 18IN PS1 BLK --

## (undated) DEVICE — T-MAX DISPOSABLE FACE MASK 8 PER BOX

## (undated) DEVICE — KIT OR TURNOVER